# Patient Record
Sex: FEMALE | Race: WHITE | NOT HISPANIC OR LATINO | Employment: OTHER | URBAN - METROPOLITAN AREA
[De-identification: names, ages, dates, MRNs, and addresses within clinical notes are randomized per-mention and may not be internally consistent; named-entity substitution may affect disease eponyms.]

---

## 2017-01-23 ENCOUNTER — PREP FOR SURGERY (OUTPATIENT)
Dept: ORTHOPEDIC SURGERY | Facility: CLINIC | Age: 72
End: 2017-01-23

## 2017-01-23 DIAGNOSIS — Z01.818 PREOP TESTING: Primary | ICD-10-CM

## 2017-01-24 ENCOUNTER — APPOINTMENT (OUTPATIENT)
Dept: PREADMISSION TESTING | Facility: HOSPITAL | Age: 72
End: 2017-01-24

## 2017-01-24 VITALS
HEART RATE: 75 BPM | RESPIRATION RATE: 16 BRPM | DIASTOLIC BLOOD PRESSURE: 72 MMHG | WEIGHT: 185 LBS | BODY MASS INDEX: 27.4 KG/M2 | TEMPERATURE: 97.2 F | OXYGEN SATURATION: 99 % | SYSTOLIC BLOOD PRESSURE: 109 MMHG | HEIGHT: 69 IN

## 2017-01-24 DIAGNOSIS — Z01.818 PREOP TESTING: ICD-10-CM

## 2017-01-24 DIAGNOSIS — M25.561 CHRONIC PAIN OF RIGHT KNEE: Primary | ICD-10-CM

## 2017-01-24 DIAGNOSIS — G89.29 CHRONIC PAIN OF RIGHT KNEE: Primary | ICD-10-CM

## 2017-01-24 LAB
ALBUMIN SERPL-MCNC: 3.9 G/DL (ref 3.5–5.2)
ALBUMIN/GLOB SERPL: 1.5 G/DL
ALP SERPL-CCNC: 84 U/L (ref 39–117)
ALT SERPL W P-5'-P-CCNC: 39 U/L (ref 1–33)
ANION GAP SERPL CALCULATED.3IONS-SCNC: 13.9 MMOL/L
AST SERPL-CCNC: 23 U/L (ref 1–32)
BILIRUB SERPL-MCNC: 0.5 MG/DL (ref 0.1–1.2)
BILIRUB UR QL STRIP: NEGATIVE
BUN BLD-MCNC: 31 MG/DL (ref 8–23)
BUN/CREAT SERPL: 27.2 (ref 7–25)
CALCIUM SPEC-SCNC: 9.7 MG/DL (ref 8.6–10.5)
CHLORIDE SERPL-SCNC: 102 MMOL/L (ref 98–107)
CLARITY UR: CLEAR
CO2 SERPL-SCNC: 25.1 MMOL/L (ref 22–29)
COLOR UR: YELLOW
CREAT BLD-MCNC: 1.14 MG/DL (ref 0.57–1)
DEPRECATED RDW RBC AUTO: 48.4 FL (ref 37–54)
ERYTHROCYTE [DISTWIDTH] IN BLOOD BY AUTOMATED COUNT: 13 % (ref 11.7–13)
GFR SERPL CREATININE-BSD FRML MDRD: 47 ML/MIN/1.73
GLOBULIN UR ELPH-MCNC: 2.6 GM/DL
GLUCOSE BLD-MCNC: 175 MG/DL (ref 65–99)
GLUCOSE UR STRIP-MCNC: NEGATIVE MG/DL
HCT VFR BLD AUTO: 42.5 % (ref 35.6–45.5)
HGB BLD-MCNC: 13.4 G/DL (ref 11.9–15.5)
HGB UR QL STRIP.AUTO: NEGATIVE
KETONES UR QL STRIP: NEGATIVE
LEUKOCYTE ESTERASE UR QL STRIP.AUTO: NEGATIVE
MCH RBC QN AUTO: 32 PG (ref 26.9–32)
MCHC RBC AUTO-ENTMCNC: 31.5 G/DL (ref 32.4–36.3)
MCV RBC AUTO: 101.4 FL (ref 80.5–98.2)
NITRITE UR QL STRIP: NEGATIVE
PH UR STRIP.AUTO: <=5 [PH] (ref 5–8)
PLATELET # BLD AUTO: 143 10*3/MM3 (ref 140–500)
PMV BLD AUTO: 11.7 FL (ref 6–12)
POTASSIUM BLD-SCNC: 4.2 MMOL/L (ref 3.5–5.2)
PROT SERPL-MCNC: 6.5 G/DL (ref 6–8.5)
PROT UR QL STRIP: NEGATIVE
RBC # BLD AUTO: 4.19 10*6/MM3 (ref 3.9–5.2)
SODIUM BLD-SCNC: 141 MMOL/L (ref 136–145)
SP GR UR STRIP: 1.03 (ref 1–1.03)
UROBILINOGEN UR QL STRIP: NORMAL
WBC NRBC COR # BLD: 4.84 10*3/MM3 (ref 4.5–10.7)

## 2017-01-24 PROCEDURE — 97161 PT EVAL LOW COMPLEX 20 MIN: CPT

## 2017-01-24 PROCEDURE — G8978 MOBILITY CURRENT STATUS: HCPCS

## 2017-01-24 PROCEDURE — G8979 MOBILITY GOAL STATUS: HCPCS

## 2017-01-24 PROCEDURE — 36415 COLL VENOUS BLD VENIPUNCTURE: CPT

## 2017-01-24 PROCEDURE — 80053 COMPREHEN METABOLIC PANEL: CPT | Performed by: ORTHOPAEDIC SURGERY

## 2017-01-24 PROCEDURE — G8980 MOBILITY D/C STATUS: HCPCS

## 2017-01-24 PROCEDURE — 81003 URINALYSIS AUTO W/O SCOPE: CPT | Performed by: ORTHOPAEDIC SURGERY

## 2017-01-24 PROCEDURE — 85027 COMPLETE CBC AUTOMATED: CPT | Performed by: ORTHOPAEDIC SURGERY

## 2017-01-24 RX ORDER — CHLORHEXIDINE GLUCONATE 500 MG/1
1 CLOTH TOPICAL TAKE AS DIRECTED
COMMUNITY
End: 2017-02-17 | Stop reason: HOSPADM

## 2017-01-24 NOTE — PROGRESS NOTES
Physical Therapy Outpatient Preoperative Total Joint Evaluation     Patient Name: Carolyn Nicolas  : 1945  MRN: [id number]  Today's Date: 2017         Surgery Date: 17    Patient Active Problem List   Diagnosis   • GERD (gastroesophageal reflux disease)   • History of colon polyps   • Non-alcoholic fatty liver disease   • Impaired renal function   • Breathlessness on exertion   • Cardiomyopathy   • Benign essential HTN   • CAD in native artery   • Primary osteoarthritis of both knees   • MGUS (monoclonal gammopathy of unknown significance)   • Anemia of chronic renal failure, stage 3 (moderate)   • Chronic pain of both knees   • Arthritis of both knees   • Automatic implantable cardioverter-defibrillator in situ   • Heart failure   • Hyperlipidemia   • Hypertension   • Hypercalcemia   • Generalized ischemic myocardial dysfunction        Past Medical History   Diagnosis Date   • Arthritis    • CHF (congestive heart failure)    • Coronary artery disease    • Diabetes mellitus    • Hypertension    • Implantable cardioverter-defibrillator discharge         Past Surgical History   Procedure Laterality Date   • Eye surgery     • Knee arthroscopy Bilateral 2009   • Coronary angioplasty with stent placement     • Cardiac defibrillator placement     • Endoscopy and colonoscopy N/A 3/8/2016     Procedure: ESOPHAGOGASTRODUODENOSCOPY AND COLONOSCOPY;  Surgeon: Dwight Monae MD;  Location: [facility] ENDOSCOPY;  Service:    • Cardiac defibrillator placement                TOTAL JOINT PREOP EVAL (last 72 hours)      Total Joint Preop Evaluation       17 1027                Preoperative Evaluation    Surgery TKR: Right  -TV        Surgery Date 17  -TV        Previous Total Joint No  -TV        Attended Class yes  -TV        Prior Activity Status    All Household independent  -TV        All Community independent  -TV        Gait independent  -TV        Transfers independent  -TV          none  -TV        DC Plans Sub Acute   [place]  -TV        Assist at home none  -TV        Home Environment 1 story  -TV        Exterior steps 1 rail   3  -TV        Pain 0-10    Pain Level 0  -TV        LE Measurements - ROM    Hip Flexion - Right AROM is WNL;Strength is grossly > or equal to 3/5  -TV        Hip Abduction - Right AROM is WNL;Strength is grossly > or equal to 3/5  -TV        Knee Flexion - Right Strength is grossly > or equal to 3/5   120  -TV        Knee Extension - Right Strength is grossly > or equal to 3/5   -20  -TV        Hip Flexion - Left AROM is WNL;Strength is grossly > or equal to 3/5  -TV        Hip Abduction - Left AROM is WNL;Strength is grossly > or equal to 3/5  -TV        Knee Flexion - Left Strength is grossly > or equal to 3/5   110  -TV        Knee Extension - Left Strength is grossly > or equal to 3/5   -10  -TV        UE ROM/Strength    UE ROM/Strength adequate for walker use  -TV        Other Measurements    Sensation/Circulation intact  -TV        Gait Observation no device  -TV        Equipment    Equipment Patient Has Cane  -TV        Equipment Patient Needs Walker  -TV        ASSESSMENT    Goal Patient demonstrates good understanding of post-op P.T.;Exercises;Surgical Procedure  -TV        Goal Met? Yes  -TV        Anticipated Progress good  -TV        Patient agrees with Plan of Treatment? Yes  -TV        Plan Will see for post op TJR protocol  -TV          User Key  (r) = Recorded By, (t) = Taken By, (c) = Cosigned By    Initials Name Effective Dates    TV Lauren Pike PT 01/07/16 -              Time Calculation:          Therapy Charges for Today     Code Description Service Date Service Provider Modifiers Qty    48724131406 HC PT MOBILITY CURRENT 1/24/2017 Lauren Pike, PT GP, CI 1    64911864140 HC PT MOBILITY PROJECTED 1/24/2017 Lauren Pike, PT GP, CI 1    [id number] HC PT MOBILITY DISCHARGE 1/24/2017 Lauren Pike, PT GP, CI 1    60663414898 HC PAT-TOTAL JOINT  CLASS 1/24/2017 Lauren Pike, PT  1    37424147692 HC PT EVAL LOW COMPLEXITY 1 1/24/2017 Lauren Pike, PT GP 1            PT G-Codes  PT Professional Judgement Used?: Yes  Functional Limitation: Mobility: Walking and moving around  Mobility: Walking and Moving Around Current Status (): At least 1 percent but less than 20 percent impaired, limited or restricted  Mobility: Walking and Moving Around Goal Status (): At least 1 percent but less than 20 percent impaired, limited or restricted  Mobility: Walking and Moving Around Discharge Status (): At least 1 percent but less than 20 percent impaired, limited or restricted      Lauren Pike, PT  1/24/2017

## 2017-01-24 NOTE — MR AVS SNAPSHOT
Carolyn Nicolas   1/24/2017 10:30 AM   Appointment    Provider:  BERT OLIVIER 2   Department:  [facility]   Dept Phone:  [phone]                Your Full Care Plan           To Do List     1/24/2017 10:30 AM     Appointment with BERT OLIVIER 2 at [facility] ([phone])   [address] [facility] [address]       2/3/2017 9:15 AM     Appointment with Gustavo Quiroga MD at [facility] ([phone])   Arrive 15 minutes prior to appointment.   [address] [facility] [address]  [facility] [address]       2/9/2017 8:40 AM     Appointment with Andrew Monae MD at [facility] ([phone])   [address]  [facility] [address]            Your Updated Medication List          This list is accurate as of: 1/24/17  9:09 AM.  Always use your most recent med list.                aspirin 81 MG tablet       atorvastatin 20 MG tablet   Commonly known as:  LIPITOR       * B-D UF III MINI PEN NEEDLES 31G X 5 MM misc   Generic drug:  Insulin Pen Needle       * B-D UF III MINI PEN NEEDLES 31G X 5 MM misc   Generic drug:  Insulin Pen Needle       * BD PEN NEEDLE JACOB U/F 32G X 4 MM misc   Generic drug:  Insulin Pen Needle       carvedilol 6.25 MG tablet   Commonly known as:  COREG       Chlorhexidine Gluconate Cloth 2 % pads       esomeprazole 40 MG capsule   Commonly known as:  nexIUM       fenofibrate 48 MG tablet   Commonly known as:  TRICOR       furosemide 40 MG tablet   Commonly known as:  LASIX       glimepiride 2 MG tablet   Commonly known as:  AMARYL       Liraglutide 18 MG/3ML solution pen-injector       mupirocin 2 % nasal ointment   Commonly known as:  BACTROBAN       ONE TOUCH ULTRA TEST test strip   Generic drug:  glucose blood       ONETOUCH DELICA LANCETS 33G misc       pramipexole 0.5 MG tablet   Commonly known  "as:  MIRAPEX       sacubitril-valsartan 49-51 MG tablet   Commonly known as:  ENTRESTO   Take 1 tablet by mouth 2 (Two) Times a Day. Indications: Heart Failure       vitamin D 90570 UNITS capsule capsule   Commonly known as:  ERGOCALCIFEROL       * Notice:  This list has 3 medication(s) that are the same as other medications prescribed for you. Read the directions carefully, and ask your doctor or other care provider to review them with you.            [employer] Signup     [facility] [employer] allows you to send messages to your doctor, view your test results, renew your prescriptions, schedule appointments, and more. To sign up, go to [employer] and click on the Sign Up Now link in the New User? box. Enter your [employer] Activation Code exactly as it appears below along with the last four digits of your Social Security Number and your Date of Birth () to complete the sign-up process. If you do not sign up before the expiration date, you must request a new code.    [employer]cast Activation Code: [email or web address]  Expires: 2017  9:09 AM    If you have questions, you can email [email or web address] or call [phone] to talk to our [employer] staff. Remember, [employer] is NOT to be used for urgent needs. For medical emergencies, dial 911.               Other Info from Your Visit           Allergies     Amoxicillin Nausea And Vomiting      Vital Signs     Blood Pressure Pulse Temperature Respirations Height Weight    109/72 (BP Location: Right arm, Patient Position: Sitting) 75 97.2 °F (36.2 °C) (Oral) 16 68.5\" (174 cm) 185 lb (83.9 kg)    Oxygen Saturation Body Mass Index Smoking Status             99% 27.72 kg/m2 Never Smoker           Discharge Instructions       Take the following medications the morning of surgery with a small sip of water.  ENTRESTO  CARVEDILOL  ESOMEPRAZOLE    ARRIVE AT 7AM.        General Instructions:  • Do not eat or drink after midnight: includes water, mints, or gum. You " may brush your teeth.  • Do not smoke, chew tobacco, or drink alcohol.  • Bring medications in original bottles, any inhalers and if applicable your C-PAP/ BI-PAP machine.  • Bring any papers given to you in the doctor’s office.  • Wear clean comfortable clothes and socks.  • Do not wear contact lenses or make-up.  Bring a case for your glasses if applicable.   • Bring crutches or walker if applicable.  • Leave all other valuables and jewelry at home.    If you were given a blood bank ID arm band remember to bring it with you the day of surgery.    Preventing a Surgical Site Infection:  Shower on the morning of surgery using a fresh bar of anti-bacterial soap (such as Dial) and clean washcloth.  Dry with a clean towel and dress in clean clothing.  For 2 to 3 days before surgery, avoid shaving with a razor near where you will have surgery because the razor can irritate skin and make it easier to develop an infection  Ask your surgeon if you will be receiving antibiotics prior to surgery  Make sure you, your family, and all healthcare providers clean their hands with soap and water or an alcohol based hand  before caring for you or your wound  If at all possible, quit smoking as many days before surgery as you can.    Day of surgery:  Upon arrival, a Pre-op nurse and Anesthesiologist will review your health history, obtain vital signs, and answer questions you may have.  The only belongings needed at this time will be your home medications and if applicable your C-PAP/BI-PAP machine.  If you are staying overnight your family can leave the rest of your belongings in the car and bring them to your room later.  A Pre-op nurse will start an IV and you may receive medication in preparation for surgery, including something to help you relax.  Your family will be able to see you in the Pre-op area.  While you are in surgery your family should notify the waiting room  if they leave the waiting room area  and provide a contact phone number.    Please be aware that surgery does come with discomfort.  We want to make every effort to control your discomfort so please discuss any uncontrolled symptoms with your nurse.   Your doctor will most likely have prescribed pain medications.      If you are going home after surgery you will receive individualized written care instructions before being discharged.  A responsible adult must drive you to and from the hospital on the day of your surgery and stay with you for 24 hours.    If you are staying overnight following surgery, you will be transported to your hospital room following the recovery period.  [facility] has all private rooms.    If you have any questions please call Pre-Admission Testing at [phone].  Deductibles and co-payments are collected on the day of service. Please be prepared to pay the required co-pay, deductible or deposit on the day of service as defined by your plan.    2% CHLORAHEXIDINE GLUCONATE* CLOTH  Preparing or “prepping” skin before surgery can reduce the risk of infection at the surgical site. To make the process easier, [facility] has chosen disposable cloths moistened with a rinse-free, 2% Chlorhexidine Gluconate (CHG) antiseptic solution. The steps below outline the prepping process and should be carefully followed.        Use the prep cloth on the area that is circled in the diagram             Directions Night before Surgery  1) Shower using a fresh bar of anti-bacterial soap (such as Dial) and clean washcloth.  Use a clean towel to completely dry your skin.  2) Do not use any lotions, oils or creams on your skin.  3) Open the package and remove 1 cloth, wipe your skin for 30 seconds in a circular motion.  Allow to dry for 3 minutes.  4) Repeat #3 with second cloth.  5) Do not touch your eyes, ears, or mouth with the prep cloth.  6) Allow the wet prep solution to air dry.  7) Discard the prep cloth and wash  your hands with soap and water.   8) Dress in clean bed clothes and sleep on fresh clean bed sheets.   9) You may experience some temporary itching after the prep.    Directions Day of Surgery  1) Repeat steps 1,2,3,4,5,6,7, and 9.   2) Dress in clean clothes before coming to the hospital.    BACTROBAN NASAL OINTMENT  There are many germs normally in your nose. Bactroban is an ointment that will help reduce these germs. Please follow these instructions for Bactroban use:        ____The day before surgery in the morning  Date________    ____The day before surgery in the evening              Date________    ____The day of surgery in the morning    Date________    **Squirt ½ package of Bactroban Ointment onto a cotton applicator and apply to inside of 1st nostril.  Squirt the remaining Bactroban and apply to the inside of the other nostril.           SYMPTOMS OF A STROKE    Call 911 or have someone take you to the Emergency Department if you have any of the following:    · Sudden numbness or weakness of your face, arm or leg especially on one side of the body  · Sudden confusion, diffiiculty speaking or trouble understanding   · Changes in your vision or loss of sight in one eye  · Sudden severe headache with no known cause  · sudden dizziness, trouble walking, loss of balance or coordination    It is important to seek emergency care right away if you have further stroke symptoms. If you get emergency help quickly, the powerful clot-dissolving medicines can reduce the disabilities caused by a stroke.     For more information:    American Stroke Association  2-962-5-STROKE  www.strokeassociation.org           IF YOU SMOKE OR USE TOBACCO PLEASE READ THE FOLLOWING:    Why is smoking bad for me?  Smoking increases the risk of heart disease, lung disease, vascular disease, stroke, and cancer.     If you smoke, STOP!    If you would like more information on quitting smoking, please visit the [employer] website:  www.[employer])/healthier-together/smoke   This link will provide additional resources including the QUIT line and the [employer] support groups.     For more information:    American Cancer Society  ([phone] 358-8281    American Heart Association  1-496.541.7002

## 2017-01-24 NOTE — DISCHARGE INSTRUCTIONS
Take the following medications the morning of surgery with a small sip of water.  ENTRESTO  CARVEDILOL  ESOMEPRAZOLE    ARRIVE AT 7AM.        General Instructions:  • Do not eat or drink after midnight: includes water, mints, or gum. You may brush your teeth.  • Do not smoke, chew tobacco, or drink alcohol.  • Bring medications in original bottles, any inhalers and if applicable your C-PAP/ BI-PAP machine.  • Bring any papers given to you in the doctor’s office.  • Wear clean comfortable clothes and socks.  • Do not wear contact lenses or make-up.  Bring a case for your glasses if applicable.   • Bring crutches or walker if applicable.  • Leave all other valuables and jewelry at home.    If you were given a blood bank ID arm band remember to bring it with you the day of surgery.    Preventing a Surgical Site Infection:  Shower on the morning of surgery using a fresh bar of anti-bacterial soap (such as Dial) and clean washcloth.  Dry with a clean towel and dress in clean clothing.  For 2 to 3 days before surgery, avoid shaving with a razor near where you will have surgery because the razor can irritate skin and make it easier to develop an infection  Ask your surgeon if you will be receiving antibiotics prior to surgery  Make sure you, your family, and all healthcare providers clean their hands with soap and water or an alcohol based hand  before caring for you or your wound  If at all possible, quit smoking as many days before surgery as you can.    Day of surgery:  Upon arrival, a Pre-op nurse and Anesthesiologist will review your health history, obtain vital signs, and answer questions you may have.  The only belongings needed at this time will be your home medications and if applicable your C-PAP/BI-PAP machine.  If you are staying overnight your family can leave the rest of your belongings in the car and bring them to your room later.  A Pre-op nurse will start an IV and you may receive medication in  preparation for surgery, including something to help you relax.  Your family will be able to see you in the Pre-op area.  While you are in surgery your family should notify the waiting room  if they leave the waiting room area and provide a contact phone number.    Please be aware that surgery does come with discomfort.  We want to make every effort to control your discomfort so please discuss any uncontrolled symptoms with your nurse.   Your doctor will most likely have prescribed pain medications.      If you are going home after surgery you will receive individualized written care instructions before being discharged.  A responsible adult must drive you to and from the hospital on the day of your surgery and stay with you for 24 hours.    If you are staying overnight following surgery, you will be transported to your hospital room following the recovery period.  [facility] has all private rooms.    If you have any questions please call Pre-Admission Testing at [phone].  Deductibles and co-payments are collected on the day of service. Please be prepared to pay the required co-pay, deductible or deposit on the day of service as defined by your plan.    2% CHLORAHEXIDINE GLUCONATE* CLOTH  Preparing or “prepping” skin before surgery can reduce the risk of infection at the surgical site. To make the process easier, [facility] has chosen disposable cloths moistened with a rinse-free, 2% Chlorhexidine Gluconate (CHG) antiseptic solution. The steps below outline the prepping process and should be carefully followed.        Use the prep cloth on the area that is circled in the diagram             Directions Night before Surgery  1) Shower using a fresh bar of anti-bacterial soap (such as Dial) and clean washcloth.  Use a clean towel to completely dry your skin.  2) Do not use any lotions, oils or creams on your skin.  3) Open the package and remove 1 cloth, wipe your skin for 30  seconds in a circular motion.  Allow to dry for 3 minutes.  4) Repeat #3 with second cloth.  5) Do not touch your eyes, ears, or mouth with the prep cloth.  6) Allow the wet prep solution to air dry.  7) Discard the prep cloth and wash your hands with soap and water.   8) Dress in clean bed clothes and sleep on fresh clean bed sheets.   9) You may experience some temporary itching after the prep.    Directions Day of Surgery  1) Repeat steps 1,2,3,4,5,6,7, and 9.   2) Dress in clean clothes before coming to the hospital.    BACTROBAN NASAL OINTMENT  There are many germs normally in your nose. Bactroban is an ointment that will help reduce these germs. Please follow these instructions for Bactroban use:        ____The day before surgery in the morning  Date________    ____The day before surgery in the evening              Date________    ____The day of surgery in the morning    Date________    **Squirt ½ package of Bactroban Ointment onto a cotton applicator and apply to inside of 1st nostril.  Squirt the remaining Bactroban and apply to the inside of the other nostril.

## 2017-02-01 ENCOUNTER — DOCUMENTATION (OUTPATIENT)
Dept: PHYSICAL THERAPY | Facility: CLINIC | Age: 72
End: 2017-02-01

## 2017-02-01 RX ORDER — SACUBITRIL AND VALSARTAN 49; 51 MG/1; MG/1
TABLET, FILM COATED ORAL
Qty: 180 TABLET | Refills: 0 | Status: ON HOLD | OUTPATIENT
Start: 2017-02-01 | End: 2017-02-14 | Stop reason: SDUPTHER

## 2017-02-03 ENCOUNTER — OFFICE VISIT (OUTPATIENT)
Dept: CARDIOLOGY | Facility: CLINIC | Age: 72
End: 2017-02-03

## 2017-02-03 VITALS
BODY MASS INDEX: 27.72 KG/M2 | DIASTOLIC BLOOD PRESSURE: 77 MMHG | WEIGHT: 185 LBS | SYSTOLIC BLOOD PRESSURE: 115 MMHG | HEART RATE: 76 BPM

## 2017-02-03 DIAGNOSIS — I50.22 CHRONIC SYSTOLIC HEART FAILURE (HCC): ICD-10-CM

## 2017-02-03 DIAGNOSIS — I10 BENIGN ESSENTIAL HTN: ICD-10-CM

## 2017-02-03 DIAGNOSIS — I10 ESSENTIAL HYPERTENSION: ICD-10-CM

## 2017-02-03 DIAGNOSIS — I42.9 CARDIOMYOPATHY (HCC): Primary | ICD-10-CM

## 2017-02-03 DIAGNOSIS — K21.9 GASTROESOPHAGEAL REFLUX DISEASE WITHOUT ESOPHAGITIS: ICD-10-CM

## 2017-02-03 DIAGNOSIS — I25.10 CAD IN NATIVE ARTERY: ICD-10-CM

## 2017-02-03 PROCEDURE — 93000 ELECTROCARDIOGRAM COMPLETE: CPT | Performed by: INTERNAL MEDICINE

## 2017-02-03 PROCEDURE — 99214 OFFICE O/P EST MOD 30 MIN: CPT | Performed by: INTERNAL MEDICINE

## 2017-02-03 RX ORDER — GLIMEPIRIDE 4 MG/1
TABLET ORAL
Refills: 3 | Status: ON HOLD | COMMUNITY
Start: 2016-12-16 | End: 2017-02-14 | Stop reason: SDUPTHER

## 2017-02-03 RX ORDER — FENOFIBRATE 48 MG/1
TABLET, COATED ORAL
Status: ON HOLD | COMMUNITY
Start: 2017-02-01 | End: 2017-02-14 | Stop reason: SDUPTHER

## 2017-02-03 NOTE — PROGRESS NOTES
Procedure   [facility]  Cardiology Progress Note    Patient Identification:  Name:Carolyn Nicolas  Age:71 y.o.  Sex: female  :  1945  MRN: [id number]           2/3/2017    Subjective:    Chief Complaint   Patient presents with   • Coronary Artery Disease   Preop eval    HPI     Ms Swelling 71-year-old white female with history of cardiomyopathy, Bi-V ICD followed by Dr. Ely, hypertension, hyperlipidemia who presents for cardiac follow-up. No chest pain. No orthopnea or PND. No dizziness, palpitations or syncope. No edema.Weight is stable, downward trend. Cholesterol studies are followed by Dr. Daniel. Her blood pressure is been running average 100 systolic with no associated symptoms. No shocks. Overall, her functional capacity is stable. She has no complaints.      Echocardiogram with Doppler shows LVEF 40-45%, she is on Entresto but I am not sure whether she will tolerate higher dose     She did have coronary stenting as well, has class 2 dyspnea on exertion that is stable. She had an episode of chest discomfort last week when she is doing some work, lasted for 10 min and resolved. No recurrance since then. Due to that she had a stress test done that showed no ischemia.  She is also waiting for a knee surgery.    Review of Systems   Constitution: Positive for malaise/fatigue. Negative for chills and fever.   HENT: Negative for headaches.    Eyes: Negative for blurred vision and double vision. Left eye: glasses.   Cardiovascular: Positive for leg swelling and palpitations (occ). Negative for chest pain and irregular heartbeat.   Respiratory: Positive for shortness of breath (occ). Negative for snoring.    Skin: Negative for color change.   Musculoskeletal: Positive for arthritis and joint pain.   Gastrointestinal: Negative for abdominal pain, nausea and vomiting.   Neurological: Negative for dizziness, light-headedness and numbness.   Psychiatric/Behavioral: Negative for depression.        Past Medical History   Diagnosis Date   • Arthritis    • CHF (congestive heart failure)    • Coronary artery disease    • Diabetes mellitus    • Hypertension    • Implantable cardioverter-defibrillator discharge        Past Surgical History   Procedure Laterality Date   • Eye surgery     • Knee arthroscopy Bilateral 2009 2007   • Coronary angioplasty with stent placement  2010   • Cardiac defibrillator placement     • Endoscopy and colonoscopy N/A 3/8/2016     Procedure: ESOPHAGOGASTRODUODENOSCOPY AND COLONOSCOPY;  Surgeon: Dwight Monae MD;  Location: [facility] ENDOSCOPY;  Service:    • Cardiac defibrillator placement         Social History     Social History   • Marital status:      Spouse name: N/A   • Number of children: N/A   • Years of education: College     Occupational History   • Teacher Retired     Social History Main Topics   • Smoking status: Never Smoker   • Smokeless tobacco: Not on file   • Alcohol use No   • Drug use: No   • Sexual activity: Defer     Other Topics Concern   • Not on file     Social History Narrative       Family History   Problem Relation Age of Onset   • Diabetes Mother    • Heart disease Mother    • Heart disease Father    • Cancer Father 85     Bladder   • Cancer Brother 58     Bladder   • Heart disease Maternal Grandmother    • Heart disease Maternal Grandfather        Scheduled Meds:    Current Outpatient Prescriptions:   •  aspirin 81 MG tablet, Take  by mouth. HOLD PER MD INSTR, Disp: , Rfl:   •  atorvastatin (LIPITOR) 20 MG tablet, Take 20 mg by mouth Every Night., Disp: , Rfl:   •  B-D UF III MINI PEN NEEDLES 31G X 5 MM misc, INJECT 1 EACH INTO THE SKIN DAILY, Disp: , Rfl: 6  •  BD PEN NEEDLE JACOB U/F 32G X 4 MM misc, , Disp: , Rfl:   •  carvedilol (COREG) 6.25 MG tablet, Take 6.25 mg by mouth 2 (Two) Times a Day., Disp: , Rfl:   •  Chlorhexidine Gluconate Cloth 2 % pads, Apply  topically. PREOP INSTR GIVEN, Disp: , Rfl:   •  ENTRESTO 49-51 MG tablet, TAKE 1  TABLET TWICE A DAY, Disp: 180 tablet, Rfl: 0  •  esomeprazole (NexIUM) 40 MG capsule, Take 40 mg by mouth Every Morning Before Breakfast. TAKE 1 CAPSULE BY MOUTH EVERY MORNING, Disp: , Rfl: 3  •  fenofibrate (TRICOR) 48 MG tablet, TAKE 1 TABLET DAILY, Disp: , Rfl:   •  furosemide (LASIX) 40 MG tablet, Take 40 mg by mouth Daily., Disp: , Rfl:   •  glimepiride (AMARYL) 4 MG tablet, TAKE 1 TABLET BY MOUTH DAILY BEFORE SUPPER, Disp: , Rfl: 3  •  Insulin Pen Needle (B-D UF III MINI PEN NEEDLES) 31G X 5 MM misc, INJECT 1 EACH INTO THE SKIN DAILY, Disp: , Rfl:   •  Liraglutide 18 MG/3ML solution pen-injector, Inject  under the skin daily., Disp: , Rfl:   •  mupirocin (BACTROBAN) 2 % nasal ointment, into each nostril 2 (Two) Times a Day. PREOP INSTR GIVEN, Disp: , Rfl:   •  ONE TOUCH ULTRA TEST test strip, USE TO TEST BLOOD SUGAR FOUR TIMES DAILY (DX: 250.02), Disp: , Rfl: 3  •  ONETOUCH DELICA LANCETS 33G misc, USE TO TEST BLOOD SUGAR FOUR TIMES DAILY (DX:250.02), Disp: , Rfl: 3  •  pramipexole (MIRAPEX) 0.5 MG tablet, Take 0.5 mg by mouth Every Night., Disp: , Rfl:   •  vitamin D (ERGOCALCIFEROL) 09629 UNITS capsule capsule, TAKE 1 CAPSULE BY MOUTH ONCE A WEEK, Disp: , Rfl: 3    Objective:  Visit Vitals   • /77   • Pulse 76   • Wt 185 lb (83.9 kg)   • BMI 27.72 kg/m2        Physical Exam  Physical Exam:    General: No acute distress.    Skin: Warm and dry, no diaphoresis noted   HEENT: No ptosis; external ear and nose normal; oral mucosa moist   Neck: Supple; no carotid bruits; no JVD, Trachea mid line   Heart: S1S2 regular rate and rhythm; no murmurs; no gallop or rub appreciated, apex not displaced   Chest: Respirations regular, unlabored at rest, bilateral breath sounds have good air entry; no  wheezes auscultated.     Abdomen: Soft, non-tender, non-distended, positive bowel sounds  No hepatosplenomegaly   Extremities: Bilateral lower extremities have no pre-tibial pitting edema; Radials are palpable    Neurological: Alert and oriented x 3; no new motor deficits,           ECG 12 Lead  Date/Time: 2/3/2017 10:51 AM  Performed by: SUZI QUIROGA  Authorized by: SUZI QUIROGA   Comparison: compared with previous ECG   Comparison to previous ECG: Atrial sensed with V pacing  Rhythm: sinus rhythm and paced           Comparison to previous ECG:  Similar to previous ecg     Assessment:  Problem List Items Addressed This Visit        Cardiovascular and Mediastinum    Cardiomyopathy - Primary    Benign essential HTN    CAD in native artery    Heart failure    Hypertension       Digestive    GERD (gastroesophageal reflux disease)          Plan:    Overall clinically she has been stable with no angina.  She does have a cardiomyopathy that has been stable.  On the stress study no evidence of ischemia last year.  No angina.  Her heart failure has been stable.    From cardiac standpoint of view no contraindications can proceed with the planned knee surgery.  Would recommend to avoid hypotension perioperatively.    After the knee surgery I will try to increase the dose of the Entresto.      02/03/2017  Herbert Quiroga MD, FACC

## 2017-02-09 ENCOUNTER — OFFICE VISIT (OUTPATIENT)
Dept: ORTHOPEDIC SURGERY | Facility: CLINIC | Age: 72
End: 2017-02-09

## 2017-02-09 VITALS — BODY MASS INDEX: 28.04 KG/M2 | HEIGHT: 68 IN | WEIGHT: 185 LBS | TEMPERATURE: 98.9 F

## 2017-02-09 DIAGNOSIS — M17.11 PRIMARY OSTEOARTHRITIS OF ONE KNEE, RIGHT: Primary | ICD-10-CM

## 2017-02-09 PROBLEM — M17.10 PRIMARY OSTEOARTHRITIS OF ONE KNEE: Status: ACTIVE | Noted: 2017-02-09

## 2017-02-09 PROCEDURE — 73562 X-RAY EXAM OF KNEE 3: CPT | Performed by: ORTHOPAEDIC SURGERY

## 2017-02-09 PROCEDURE — 99213 OFFICE O/P EST LOW 20 MIN: CPT | Performed by: ORTHOPAEDIC SURGERY

## 2017-02-09 NOTE — PROGRESS NOTES
History & Physical       Patient: Carolyn Nicolas  YOB: 1945  Medical Record Number: [id number]  Body mass index is 28.13 kg/(m^2).    Surgeon:  Dr. Andrew Monae    Chief Complaints:   Chief Complaint   Patient presents with   • Right Knee - Pre-op Exam       Subjective:  This problem is not new to this examiner.     History of Present Illness: 71 y.o. female presents with   Chief Complaint   Patient presents with   • Right Knee - Pre-op Exam   . Onset of symptoms was years ago and has been progressively worsening despite more conservative treatment measures.  Symptoms are associated with ability to move, exercise, and perform activities of daily living.  Symptoms are aggravated by weight bearing and ROM necessary for activities of daily living.   Symptoms improve with rest, ice and elevation only minimally.      Allergies:   Allergies   Allergen Reactions   • Amoxicillin Nausea And Vomiting       Medications:   Home Medications:  Current Outpatient Prescriptions on File Prior to Visit   Medication Sig   • aspirin 81 MG tablet Take  by mouth. HOLD PER MD INSTR   • atorvastatin (LIPITOR) 20 MG tablet Take 20 mg by mouth Every Night.   • B-D UF III MINI PEN NEEDLES 31G X 5 MM misc INJECT 1 EACH INTO THE SKIN DAILY   • BD PEN NEEDLE JACOB U/F 32G X 4 MM misc    • carvedilol (COREG) 6.25 MG tablet Take 6.25 mg by mouth 2 (Two) Times a Day.   • Chlorhexidine Gluconate Cloth 2 % pads Apply  topically. PREOP INSTR GIVEN   • ENTRESTO 49-51 MG tablet TAKE 1 TABLET TWICE A DAY   • esomeprazole (NexIUM) 40 MG capsule Take 40 mg by mouth Every Morning Before Breakfast. TAKE 1 CAPSULE BY MOUTH EVERY MORNING   • fenofibrate (TRICOR) 48 MG tablet TAKE 1 TABLET DAILY   • furosemide (LASIX) 40 MG tablet Take 40 mg by mouth Daily.   • glimepiride (AMARYL) 4 MG tablet TAKE 1 TABLET BY MOUTH DAILY BEFORE SUPPER   • Insulin Pen Needle (B-D UF III MINI PEN NEEDLES) 31G X 5 MM misc INJECT 1 EACH INTO THE SKIN DAILY    • Liraglutide 18 MG/3ML solution pen-injector Inject  under the skin daily.   • mupirocin (BACTROBAN) 2 % nasal ointment into each nostril 2 (Two) Times a Day. PREOP INSTR GIVEN   • ONE TOUCH ULTRA TEST test strip USE TO TEST BLOOD SUGAR FOUR TIMES DAILY (DX: 250.02)   • ONETOUCH DELICA LANCETS 33G misc USE TO TEST BLOOD SUGAR FOUR TIMES DAILY (DX:250.02)   • pramipexole (MIRAPEX) 0.5 MG tablet Take 0.5 mg by mouth Every Night.   • vitamin D (ERGOCALCIFEROL) 42716 UNITS capsule capsule TAKE 1 CAPSULE BY MOUTH ONCE A WEEK     No current facility-administered medications on file prior to visit.      Current Medications:  Scheduled Meds:  Continuous Infusions:  No current facility-administered medications for this visit.   PRN Meds:.    I have reviewed the patient's medical history in detail and updated the computerized patient record.  Review and summarization of old records include:    Past Medical History   Diagnosis Date   • Arthritis    • CHF (congestive heart failure)    • Coronary artery disease    • Diabetes mellitus    • Hypertension    • Implantable cardioverter-defibrillator discharge         Past Surgical History   Procedure Laterality Date   • Eye surgery     • Knee arthroscopy Bilateral 2009 2007   • Coronary angioplasty with stent placement  2010   • Cardiac defibrillator placement     • Endoscopy and colonoscopy N/A 3/8/2016     Procedure: ESOPHAGOGASTRODUODENOSCOPY AND COLONOSCOPY;  Surgeon: Dwight Monae MD;  Location: [facility] ENDOSCOPY;  Service:    • Cardiac defibrillator placement          Social History     Occupational History   • Teacher Retired     Social History Main Topics   • Smoking status: Never Smoker   • Smokeless tobacco: Never Used   • Alcohol use No   • Drug use: No   • Sexual activity: Defer    Social History     Social History Narrative        Family History   Problem Relation Age of Onset   • Diabetes Mother    • Heart disease Mother    • Heart disease Father    • Cancer  Father 85     Bladder   • Cancer Brother 58     Bladder   • Heart disease Maternal Grandmother    • Heart disease Maternal Grandfather        ROS: 14 point review of systems was performed and was negative except for documented findings in HPI and today's encounter.     Allergies:   Allergies   Allergen Reactions   • Amoxicillin Nausea And Vomiting     Constitutional:  Denies fever, shaking or chills   Eyes:  Denies change in visual acuity   HENT:  Denies nasal congestion or sore throat   Respiratory:  Denies cough or shortness of breath   Cardiovascular:  Denies chest pain or severe LE edema   GI:  Denies abdominal pain, nausea, vomiting, bloody stools or diarrhea   Musculoskeletal:  Denies numbness tingling or loss of motor function except as outlined above in history of present illness.  : Denies painful urination or hematuria  Integument:  Denies rash, lesion or ulceration   Neurologic:  Denies headache or focal weakness  Endocrine:  Denies lymphadenopathy  Psych:  Denies confusion or change in mental status   Hem:  Denies active bleeding    Physical Exam: 71 y.o. female  Body mass index is 28.13 kg/(m^2)., @HT@, @WT@  Vitals:    02/09/17 0821   Temp: 98.9 °F (37.2 °C)     Vital signs reviewed.   General Appearance:    Alert, cooperative, in no acute distress                  Eyes: conjunctiva clear  ENT: external ears and nose atraumatic  CV: no peripheral edema  Resp: normal respiratory effort  Skin: no rashes or wounds; normal turgor  Psych: mood and affect appropriate  Lymph: no nodes appreciated  Neuro: gross sensation intact  Vascular:  Palpable peripheral pulse in noted extremity  Musculoskeletal Extremities: KNEE Exam: lateral joint line tenderness with crepitation, synovitis, swelling, and joint effusion right knee.          Diagnostic Tests:  No visits with results within 2 Week(s) from this visit.  Latest known visit with results is:    Appointment on 01/24/2017   Component Date Value Ref Range  Status   • Glucose 01/24/2017 175* 65 - 99 mg/dL Final   • BUN 01/24/2017 31* 8 - 23 mg/dL Final   • Creatinine 01/24/2017 1.14* 0.57 - 1.00 mg/dL Final   • Sodium 01/24/2017 141  136 - 145 mmol/L Final   • Potassium 01/24/2017 4.2  3.5 - 5.2 mmol/L Final   • Chloride 01/24/2017 102  98 - 107 mmol/L Final   • CO2 01/24/2017 25.1  22.0 - 29.0 mmol/L Final   • Calcium 01/24/2017 9.7  8.6 - 10.5 mg/dL Final   • Total Protein 01/24/2017 6.5  6.0 - 8.5 g/dL Final   • Albumin 01/24/2017 3.90  3.50 - 5.20 g/dL Final   • ALT (SGPT) 01/24/2017 39* 1 - 33 U/L Final   • AST (SGOT) 01/24/2017 23  1 - 32 U/L Final   • Alkaline Phosphatase 01/24/2017 84  39 - 117 U/L Final   • Total Bilirubin 01/24/2017 0.5  0.1 - 1.2 mg/dL Final   • eGFR Non  Amer 01/24/2017 47* >60 mL/min/1.73 Final   • Globulin 01/24/2017 2.6  gm/dL Final   • A/G Ratio 01/24/2017 1.5  g/dL Final   • BUN/Creatinine Ratio 01/24/2017 27.2* 7.0 - 25.0 Final   • Anion Gap 01/24/2017 13.9  mmol/L Final   • WBC 01/24/2017 4.84  4.50 - 10.70 10*3/mm3 Final   • RBC 01/24/2017 4.19  3.90 - 5.20 10*6/mm3 Final   • Hemoglobin 01/24/2017 13.4  11.9 - 15.5 g/dL Final   • Hematocrit 01/24/2017 42.5  35.6 - 45.5 % Final   • MCV 01/24/2017 101.4* 80.5 - 98.2 fL Final   • MCH 01/24/2017 32.0  26.9 - 32.0 pg Final   • MCHC 01/24/2017 31.5* 32.4 - 36.3 g/dL Final   • RDW 01/24/2017 13.0  11.7 - 13.0 % Final   • RDW-SD 01/24/2017 48.4  37.0 - 54.0 fl Final   • MPV 01/24/2017 11.7  6.0 - 12.0 fL Final   • Platelets 01/24/2017 143  140 - 500 10*3/mm3 Final   • Color, UA 01/24/2017 Yellow  Yellow, Straw Final   • Appearance, UA 01/24/2017 Clear  Clear Final   • pH, UA 01/24/2017 <=5.0  5.0 - 8.0 Final   • Specific Gravity, UA 01/24/2017 1.028  1.005 - 1.030 Final   • Glucose, UA 01/24/2017 Negative  Negative Final   • Ketones, UA 01/24/2017 Negative  Negative Final   • Bilirubin, UA 01/24/2017 Negative  Negative Final   • Blood, UA 01/24/2017 Negative  Negative Final   •  Protein, UA 01/24/2017 Negative  Negative Final   • Leuk Esterase, UA 01/24/2017 Negative  Negative Final   • Nitrite, UA 01/24/2017 Negative  Negative Final   • Urobilinogen, UA 01/24/2017 0.2 E.U./dL  0.2 - 1.0 E.U./dL Final     No results found.    Imaging was done today and discussed at length with the patient:    Indication: pain related symptoms,  Views: 3V AP, LAT & 40 degree PA right knee(s)   Findings: severe end-stage arthritis (bone on bone, subchondral sclerosis/cysts, osteophytes)  Comparison views: viewed last xray done in the office.     Assessment:  Patient Active Problem List   Diagnosis   • GERD (gastroesophageal reflux disease)   • History of colon polyps   • Non-alcoholic fatty liver disease   • Impaired renal function   • Breathlessness on exertion   • Cardiomyopathy   • Benign essential HTN   • CAD in native artery   • Primary osteoarthritis of both knees   • MGUS (monoclonal gammopathy of unknown significance)   • Anemia of chronic renal failure, stage 3 (moderate)   • Chronic pain of both knees   • Arthritis of both knees   • Automatic implantable cardioverter-defibrillator in situ   • Heart failure   • Hyperlipidemia   • Hypertension   • Hypercalcemia   • Generalized ischemic myocardial dysfunction   • Primary osteoarthritis of one knee       Plan:  Dr. Andrew Monae reviewed anatomy of a total joint arthroplasty in laymen's terms, as well as typical postoperative recovery and possibly 6-12 months for maximal recovery, and possible need for rehabilitation stay after hospitalization. We also discussed risks, benefits, alternatives, and limitations of procedure with risks including but not limited to neurovascular damage, bleeding, infection, malalignment, chronic pian, failure of implants, osteolysis, loosening of implants, loss of motion, weakness, stiffness, instability, DVT, pulmonary embolus, death, stroke, complex regional pain syndrome, myocardial infarction, and need for additional  procedures. Concept of substitution vs. replacement discussed.  No guarantees were given regarding results of surgery.      Carolyn Nicolas was given the opportunity to ask and have all questions answered today.  The patient voiced understanding of the risks, benefits, and alternative forms of treatment that were discussed and the patient consents to proceed with surgery.     Patient's blood clot history is negative.    Discharge Plan: POD 2-3 to [facility]    Patient was seen by Dr. Andrew Monae in the office today.    Date: 2/9/2017  Andrew Monae MD

## 2017-02-14 ENCOUNTER — ANESTHESIA (OUTPATIENT)
Dept: PERIOP | Facility: HOSPITAL | Age: 72
End: 2017-02-14

## 2017-02-14 ENCOUNTER — APPOINTMENT (OUTPATIENT)
Dept: GENERAL RADIOLOGY | Facility: HOSPITAL | Age: 72
End: 2017-02-14

## 2017-02-14 ENCOUNTER — ANESTHESIA EVENT (OUTPATIENT)
Dept: PERIOP | Facility: HOSPITAL | Age: 72
End: 2017-02-14

## 2017-02-14 PROBLEM — M17.11 ARTHRITIS OF RIGHT KNEE: Status: ACTIVE | Noted: 2017-02-14

## 2017-02-14 PROCEDURE — 25010000002 FENTANYL CITRATE (PF) 100 MCG/2ML SOLUTION: Performed by: NURSE ANESTHETIST, CERTIFIED REGISTERED

## 2017-02-14 PROCEDURE — 25010000002 PROPOFOL 10 MG/ML EMULSION: Performed by: NURSE ANESTHETIST, CERTIFIED REGISTERED

## 2017-02-14 PROCEDURE — 73560 X-RAY EXAM OF KNEE 1 OR 2: CPT

## 2017-02-14 PROCEDURE — 25010000002 SUCCINYLCHOLINE PER 20 MG: Performed by: NURSE ANESTHETIST, CERTIFIED REGISTERED

## 2017-02-14 PROCEDURE — 25010000002 ONDANSETRON PER 1 MG: Performed by: NURSE ANESTHETIST, CERTIFIED REGISTERED

## 2017-02-14 RX ORDER — FENTANYL CITRATE 50 UG/ML
INJECTION, SOLUTION INTRAMUSCULAR; INTRAVENOUS AS NEEDED
Status: DISCONTINUED | OUTPATIENT
Start: 2017-02-14 | End: 2017-02-14 | Stop reason: SURG

## 2017-02-14 RX ORDER — PROPOFOL 10 MG/ML
VIAL (ML) INTRAVENOUS AS NEEDED
Status: DISCONTINUED | OUTPATIENT
Start: 2017-02-14 | End: 2017-02-14 | Stop reason: SURG

## 2017-02-14 RX ORDER — ONDANSETRON 2 MG/ML
INJECTION INTRAMUSCULAR; INTRAVENOUS AS NEEDED
Status: DISCONTINUED | OUTPATIENT
Start: 2017-02-14 | End: 2017-02-14 | Stop reason: SURG

## 2017-02-14 RX ORDER — SODIUM CHLORIDE 9 MG/ML
INJECTION, SOLUTION INTRAVENOUS CONTINUOUS PRN
Status: DISCONTINUED | OUTPATIENT
Start: 2017-02-14 | End: 2017-02-14 | Stop reason: SURG

## 2017-02-14 RX ORDER — SUCCINYLCHOLINE CHLORIDE 20 MG/ML
INJECTION INTRAMUSCULAR; INTRAVENOUS AS NEEDED
Status: DISCONTINUED | OUTPATIENT
Start: 2017-02-14 | End: 2017-02-14 | Stop reason: SURG

## 2017-02-14 RX ORDER — LIDOCAINE HYDROCHLORIDE 20 MG/ML
INJECTION, SOLUTION INFILTRATION; PERINEURAL AS NEEDED
Status: DISCONTINUED | OUTPATIENT
Start: 2017-02-14 | End: 2017-02-14 | Stop reason: SURG

## 2017-02-14 RX ADMIN — FENTANYL CITRATE 50 MCG: 50 INJECTION INTRAMUSCULAR; INTRAVENOUS at 09:50

## 2017-02-14 RX ADMIN — EPHEDRINE SULFATE 10 MG: 50 INJECTION INTRAMUSCULAR; INTRAVENOUS; SUBCUTANEOUS at 10:49

## 2017-02-14 RX ADMIN — ONDANSETRON 4 MG: 2 INJECTION INTRAMUSCULAR; INTRAVENOUS at 11:04

## 2017-02-14 RX ADMIN — FENTANYL CITRATE 50 MCG: 50 INJECTION INTRAMUSCULAR; INTRAVENOUS at 10:34

## 2017-02-14 RX ADMIN — SODIUM CHLORIDE: 9 INJECTION, SOLUTION INTRAVENOUS at 09:43

## 2017-02-14 RX ADMIN — PROPOFOL 120 MG: 10 INJECTION, EMULSION INTRAVENOUS at 09:54

## 2017-02-14 RX ADMIN — PROPOFOL 30 MG: 10 INJECTION, EMULSION INTRAVENOUS at 09:55

## 2017-02-14 RX ADMIN — CLINDAMYCIN PHOSPHATE 900 MG: 150 INJECTION, SOLUTION, CONCENTRATE INTRAVENOUS at 09:43

## 2017-02-14 RX ADMIN — LIDOCAINE HYDROCHLORIDE 60 MG: 20 INJECTION, SOLUTION INFILTRATION; PERINEURAL at 09:54

## 2017-02-14 RX ADMIN — SUCCINYLCHOLINE CHLORIDE 120 MG: 20 INJECTION, SOLUTION INTRAMUSCULAR; INTRAVENOUS; PARENTERAL at 09:54

## 2017-02-14 NOTE — ANESTHESIA PROCEDURE NOTES
Airway  Urgency: elective    Date/Time: 2/14/2017 9:55 AM  Airway not difficult    General Information and Staff    Patient location during procedure: OR  Anesthesiologist: SIDNEY LANE  CRNA: KAI TRINIDAD    Indications and Patient Condition  Indications for airway management: airway protection    Preoxygenated: yes  Mask difficulty assessment: 1 - vent by mask    Final Airway Details  Final airway type: endotracheal airway      Successful airway: ETT  Cuffed: yes   Successful intubation technique: direct laryngoscopy  Endotracheal tube insertion site: oral  Blade: Bonnie  Blade size: #3  ETT size: 7.0 mm  Cormack-Lehane Classification: grade I - full view of glottis  Placement verified by: chest auscultation and capnometry   Cuff volume (mL): 6  Measured from: lips  ETT to lips (cm): 20  Number of attempts at approach: 1    Additional Comments  EBBS: ETCO2+: Lips and teeth same as pre op

## 2017-02-14 NOTE — ANESTHESIA PREPROCEDURE EVALUATION
Anesthesia Evaluation     Patient summary reviewed and Nursing notes reviewed   no history of anesthetic complications:  NPO Status: > 8 hours   Airway   Mallampati: II  TM distance: >3 FB  Neck ROM: full  Dental - normal exam     Pulmonary - normal exam   (+) shortness of breath,   Cardiovascular - normal exam    (+) pacemaker pacemaker,hypertension, CAD, CHF,       Neuro/Psych  GI/Hepatic/Renal/Endo    (+)  GERD, liver disease, chronic renal disease CRI, diabetes mellitus,     Musculoskeletal     Abdominal    Substance History      OB/GYN          Other   (+) arthritis                                 Anesthesia Plan    ASA 3     general     Anesthetic plan and risks discussed with patient.

## 2017-02-14 NOTE — ANESTHESIA POSTPROCEDURE EVALUATION
Patient: Carolyn Nicolas    Procedure Summary     Date Anesthesia Start Anesthesia Stop Room / Location    02/14/17 0943 1134  [facility] /  [facility]       Procedure Diagnosis Surgeon Provider    TOTAL KNEE ARTHROPLASTY WITH MARGE NAVIGATION (Right Knee) Arthritis of right knee  (Arthritis of right knee [M19.90]) MD Alexandru Barnett MD          Anesthesia Type: general  Last vitals  /56 (02/14/17 1200)    Temp      Pulse 69 (02/14/17 1200)   Resp 16 (02/14/17 1200)    SpO2 100 % (02/14/17 1200)      Post Anesthesia Care and Evaluation    Patient location during evaluation: PACU  Patient participation: complete - patient participated  Level of consciousness: awake and alert  Pain management: adequate  Airway patency: patent  Anesthetic complications: No anesthetic complications    Cardiovascular status: acceptable  Respiratory status: acceptable  Hydration status: acceptable

## 2017-02-22 ENCOUNTER — DOCUMENTATION (OUTPATIENT)
Dept: PHYSICAL THERAPY | Facility: CLINIC | Age: 72
End: 2017-02-22

## 2017-02-27 ENCOUNTER — OFFICE VISIT (OUTPATIENT)
Dept: ORTHOPEDIC SURGERY | Facility: CLINIC | Age: 72
End: 2017-02-27

## 2017-02-27 VITALS — TEMPERATURE: 97.6 F | WEIGHT: 183 LBS | BODY MASS INDEX: 27.11 KG/M2 | HEIGHT: 69 IN

## 2017-02-27 DIAGNOSIS — Z96.651 STATUS POST TOTAL RIGHT KNEE REPLACEMENT: Primary | ICD-10-CM

## 2017-02-27 PROCEDURE — 99024 POSTOP FOLLOW-UP VISIT: CPT | Performed by: NURSE PRACTITIONER

## 2017-02-27 PROCEDURE — 73560 X-RAY EXAM OF KNEE 1 OR 2: CPT | Performed by: NURSE PRACTITIONER

## 2017-02-27 RX ORDER — OXYCODONE AND ACETAMINOPHEN 7.5; 325 MG/1; MG/1
TABLET ORAL
Qty: 100 TABLET | Refills: 0 | Status: SHIPPED | OUTPATIENT
Start: 2017-02-27 | End: 2017-06-06

## 2017-02-27 NOTE — PATIENT INSTRUCTIONS
ASSESSMENT: 2 week status post right total knee replacement expected healing.    PLAN: 1) Staples removed and steri strips applied   2) Order given for PT and pain medicine (as needed)   3) Continue LUCY hose for four weeks   4) Continue ice PRN   5) Continue EC Aspirin 325mg by mouth twice a day until 6 weeks post op.   6) Follow up in 4 weeks with repeat Xrays of right knee (2 views)   7) Continue with antibiotic prophylaxis with dental procedures for 2 yrs post total joint replacement.    Shavon Soto, APRN  2/27/2017     Pain Medications utilized after surgery are narcotics and the law requires that the following information be given to all patients that are prescribed narcotics:    CLASSIFICATION: Pain medications are called Opioids and are narcotics  LEGALITIES: It is illegal to share narcotics with others and to drive within 4 hours of taking narcotics  POTENTIAL SIDE EFFECTS: Potential side effects of opioids include: nausea, vomiting, itching, dizziness, drowsiness, dry mouth, constipation, and difficulty urinating.  POTENTIAL ADVERSE EFFECTS:   Opioid tolerance can develop with use of pain medications and this simply means that it requires more and more of the medication to control pain; however, this is seen more in patients that use opioids for longer periods of time.  Opioid dependence can develop with use of Opioids and this simply means that to stop the medication can cause withdrawal symptoms so wean gradually (do not stop all at once); however, this is seen more with patients that use opioids for longer periods of time.  Opioid addiction can develop with use of Opioids and the incidence of this is very unlikely in patients who take the medications as ordered and stop the medications as instructed.  Opioid overdose can be dangerous, but is unlikely when the medication is taken as ordered and stopped when ordered. It is important not to mix opioids with alcohol or with and type of sedative such as  Benadryl as this can lead to over sedation and respiratory difficulty.    DOSAGE:   Pain medications will need to be taken consistently for the first week to decrease pain and promote adequate pain relief and participation in physical therapy.    After the initial surgical pain begins to resolve, you may begin to decrease the pain medication. By the end of 8 weeks, you should be off of pain medications.    Refills will not be given by the office during evening hours, on weekends.  To seek refills on pain medications during the initial 6 week post-operative period, you must call the office 48 hours in advance to request the refill. The office will then notify you when to  the prescription. DO NOT wait until you are out of the medication to request a refill.  Knee Rehabilitation Guidelines Following Surgery  After knee surgery, it is important to follow instructions from your health care provider about range-of-motion (ROM) and muscle strengthening exercises. This will improve your surgery results. If the exercises cause you to have pain or swelling in your knee joint, do them less often until you can do them without pain. Then, slowly increase how often you do your exercises. If you have problems or questions, talk with your health care provider or physical therapist. You should start exercising as soon as your health care provider or physical therapist says it is okay.  HOME CARE INSTRUCTIONS  Activity  · Use your crutches or walker as told by your health care provider.  · Do not lift anything that is heavier than 10 lb (4.5 kg) and do not play contact sports until your health care provider says it is okay.  · Return to your normal activities as told by your health care provider. Ask your health care provider what activities are safe for you.  · Return to work as told by your health care provider.  · Do not drive a car for six weeks or as told by your health care provider.  General Instructions  · Take  over-the-counter and prescription medicines only as told by your health care provider.  · Protect your knee during the recovery period to keep it from getting injured again.  · You may take sponge baths. Do not take showers or tub baths until your health care provider says it is okay.  · Remove throw rugs and tripping hazards from the floor.  · Wear elastic stockings for as long as your health care provider instructs you to.  · Keep all follow-up visits as told by your health care provider. This is important.  RANGE-OF-MOTION AND STRENGTHENING EXERCISES  Do your exercises as told by your health care provider or physical therapist.  Before You Exercise  · Put a towel between your thigh and a heat pack or heating pad.  · Leave the heat on your thigh muscle for 20-30 minutes before you exercise.  Leg Lifts  While your knee is still in a splint or a cast, you can do straight-leg raises. Repeat this exercise 10-20 times, 2-3 times per day. As your knee gets better, do this exercise against resistance.  1. Lie flat on your back.  2. Lift the leg about 6 inches. Keep it raised for 3 seconds.  3. Slowly lower the leg.  Quad Sets  Repeat this exercise 10-20 times every hour.  1. Lie flat on your back.  2. Tighten your thigh muscle (quad).  3. Keep the muscle tight for 5-10 seconds.  Hamstring Sets  Repeat this exercise 10-20 times every hour.  1. Push your foot backward against an object that does not move.  2. Keep pushing your foot against it for 5-10 seconds.  Weight-Resistance Exercises  Weight-resistance exercises are another important part of rehabilitation. These exercises strengthen your muscles by making them work against resistance. Examples include using:  · Free weights.  · Weight-lifting machines.  · Resistance bands.  Aerobic Exercises  Aerobic exercise keeps joints and muscles moving. It involves large muscle groups. It is also rhythmic in nature and is done for a longer period. Doing these exercises improves  circulation and endurance. Your health care provider may have you start by taking a 20-30 minute walk, 2 times per day. Examples of aerobic exercise include:  · Swimming.  · Walking.  · Hiking.  · Jogging.  · Cross-country skiing.  · Bike riding.     This information is not intended to replace advice given to you by your health care provider. Make sure you discuss any questions you have with your health care provider.     Document Released: 12/18/2006 Document Revised: 05/03/2016 Document Reviewed: 12/14/2015  [employer] Patient Education ©2016 [employer]

## 2017-02-27 NOTE — PROGRESS NOTES
Carolyn Nicolas : 1945 MRN: [id number] DATE: 2017  Body mass index is 27.42 kg/(m^2).  Vitals:    17 0906   Temp: 97.6 °F (36.4 °C)       DIAGNOSIS: 2 week follow up right total knee arthroplasty    SUBJECTIVE:Patient returns today for 2 week follow up of right total knee replacement. Patient reports doing well with no unusual complaints. Appears to be progressing appropriately.    OBJECTIVE:   Exam:. The incision is healing appropriately. No sign of infection. Range of motion is progressing as expected -. The calf is soft and nontender with a negative Homans sign.    DIAGNOSTIC STUDIES  Xrays: 2 views of the right knee (AP full length and lateral) were ordered and reviewed for evaluation of recent knee replacement. They demonstrate a well positioned, well aligned knee replacement without complicating factors noted. In comparison with previous films there has been interval implant placement.    ASSESSMENT: 2 week status post right total knee replacement expected healing.    PLAN: 1) Staples removed and steri strips applied   2) Order given for PT and pain medicine (as needed)   3) Continue LUCY hose for four weeks   4) Continue ice PRN   5) Continue EC Aspirin 325mg by mouth twice a day until 6 weeks post op.   6) Follow up in 4 weeks with repeat Xrays of right knee (2 views)   7) Continue with antibiotic prophylaxis with dental procedures for 2 yrs post total joint replacement.    Shavon Soto, APRN  2017     Pain Medications utilized after surgery are narcotics and the law requires that the following information be given to all patients that are prescribed narcotics:    CLASSIFICATION: Pain medications are called Opioids and are narcotics  LEGALITIES: It is illegal to share narcotics with others and to drive within 4 hours of taking narcotics  POTENTIAL SIDE EFFECTS: Potential side effects of opioids include: nausea, vomiting, itching, dizziness, drowsiness, dry mouth, constipation,  and difficulty urinating.  POTENTIAL ADVERSE EFFECTS:   Opioid tolerance can develop with use of pain medications and this simply means that it requires more and more of the medication to control pain; however, this is seen more in patients that use opioids for longer periods of time.  Opioid dependence can develop with use of Opioids and this simply means that to stop the medication can cause withdrawal symptoms so wean gradually (do not stop all at once); however, this is seen more with patients that use opioids for longer periods of time.  Opioid addiction can develop with use of Opioids and the incidence of this is very unlikely in patients who take the medications as ordered and stop the medications as instructed.  Opioid overdose can be dangerous, but is unlikely when the medication is taken as ordered and stopped when ordered. It is important not to mix opioids with alcohol or with and type of sedative such as Benadryl as this can lead to over sedation and respiratory difficulty.    DOSAGE:   Pain medications will need to be taken consistently for the first week to decrease pain and promote adequate pain relief and participation in physical therapy.    After the initial surgical pain begins to resolve, you may begin to decrease the pain medication. By the end of 8 weeks, you should be off of pain medications.    Refills will not be given by the office during evening hours, on weekends.  To seek refills on pain medications during the initial 6 week post-operative period, you must call the office 48 hours in advance to request the refill. The office will then notify you when to  the prescription. DO NOT wait until you are out of the medication to request a refill.

## 2017-03-07 ENCOUNTER — TREATMENT (OUTPATIENT)
Dept: PHYSICAL THERAPY | Facility: CLINIC | Age: 72
End: 2017-03-07

## 2017-03-07 DIAGNOSIS — Z96.651 STATUS POST TOTAL RIGHT KNEE REPLACEMENT: ICD-10-CM

## 2017-03-07 DIAGNOSIS — R26.9 GAIT DIFFICULTY: Primary | ICD-10-CM

## 2017-03-07 DIAGNOSIS — M25.561 ACUTE PAIN OF RIGHT KNEE: ICD-10-CM

## 2017-03-07 PROCEDURE — 97110 THERAPEUTIC EXERCISES: CPT | Performed by: PHYSICAL THERAPIST

## 2017-03-07 PROCEDURE — 97161 PT EVAL LOW COMPLEX 20 MIN: CPT | Performed by: PHYSICAL THERAPIST

## 2017-03-07 NOTE — PROGRESS NOTES
Physical Therapy Initial Evaluation and Plan of Care    Patient: Carolyn Nicolas   : 1945  Diagnosis/ICD-10 Code:  Status post total right knee replacement [Z96.651]  Referring practitioner: NAOMI Cristobal  Past medical Hx reviewed: 3/7/2017  Subjective Evaluation    History of Present Illness  Date of surgery: 2017  Mechanism of injury: I had knee replacement surgery and then went to [facility] for rehab 17 to 17.  Over the last week I have been home trying to work on my exercises. The knee still swells and gets stiff depending on my activity.      Current level of function;  Transfers (getting out of the car is more difficult) chairs require arms.  Getting in/out of bed is okay but no change of position at night.  Donning shoes/socks for LE can be difficult.  Doing fine showering and toileting.          Pain  Current pain ratin  At best pain ratin  At worst pain ratin (At night after walking on it too much. )  Quality: tight, dull ache and throbbing (Deep ache.  Occasionally electric nerve shocks.  )  Relieving factors: ice, rest, relaxation and medications (Off and on prescription and tylenol typically. )  Progression: improved    Social Support  Lives in: multiple-level home (Basement stairs and 4 to enter.  Leading with L leg up)  Lives with: alone    Treatments  Previous treatment: physical therapy (Rehab )  Patient Goals  Patient goals for therapy: decreased edema, decreased pain, improved balance, increased motion, increased strength and independence with ADLs/IADLs        Objective     Static Posture     Knee   Knee (Left): Flexed.     Observations     Right Knee   Positive for adhesive scar (mild puckering of center 1/3 of scar.) and edema.     Additional Observation Details  All steri-strips in tact.      Active Range of Motion     Right Knee   Flexion: 90 (Supine ) degrees with pain    Strength/Myotome Testing     Right Knee   Flexion: 3+  Prone flexion:  3  Extension: 4    Additional Strength Details  Hip flexion: R: 3+   Hip ABd: R: 4/5   Hip Add: R: 4/5   Hip Ext: R: 4/5      Functional Assessment     Single Leg Stance   Right: 5 seconds    Comments  TU sec.    30 sec. Sit to stand:  6x    Assessment & Plan     Assessment  Impairments: abnormal coordination, abnormal gait, abnormal muscle firing, abnormal or restricted ROM, activity intolerance, impaired balance, impaired physical strength and pain with function  Assessment details: Pt will benefit from skilled PT intervention to address deficits outlined above and to regain safe independence.      Prognosis: good  Prognosis details:     ST-7 visits   1. Patient to be compliant with stretching and HEP  Able to sit > 30 then stand with < 4/10 pain.   2. R MMT 4+/5 for ability to ascend/descend 10 steps alternating and transfer sit to stand x 10 with knee pain <4/10.    3. Increased hip joint, patellar mobility to allow for decreased stress at tibiofemoral, patellofemoral joint. (knee ROM 18°-105°)  4. Pt. to exhibit increased LE soft tissue extensibility to allow for increased ease with walking 10 minutes unsupported.   5.  Pt to exhibit improved stability of knee to balance on L 5 sec to promote safety outdoors and uneven terrain.      LT-14 visits   1.  Pt. to score >85% of disability on WOMAC.   2.  L LE strength to at least 5-/5 to ascend/descend 15 steps without pain >2/10 and no UE.  3.  Pt to exhibit improved Knee PROM 2°-120° to allow for improved gait, kneeling, bending squatting as is necessary for community tasks.    4.  Pt to exhibit LE endurance/strength to 5-/5 to allow for returning to unrestricted walking > 30 min.    5. Decrease TUG time to <10sec, increase 30 sec sit to stand to >12x with UE and even foot placement to demonstrate improved function.     6.  Pt to demonstrate increased stability of the knee to balance on L for 10 as needed to traverse uneven terrain.       Plan  Therapy options: will be seen for skilled physical therapy services  Planned modality interventions: thermotherapy (hydrocollator packs), TENS and cryotherapy  Planned therapy interventions: manual therapy, neuromuscular re-education, postural training, soft tissue mobilization, joint mobilization, home exercise program, gait training, functional ROM exercises, flexibility, balance/weight-bearing training, strengthening, stretching, therapeutic activities and transfer training  Frequency: 3x week  Duration in weeks: 5  Treatment plan discussed with: patient    Manual Therapy:    -     mins  32799;  Therapeutic Exercise:    10     mins  84805;     Neuromuscular Kelle:    -    mins  87012;    Therapeutic Activity:     -     mins  92695;     Gait Training:      -     mins  56547;     Ultrasound:     -     mins  64822;    Electrical Stimulation:    -     mins  37708 ( );  Dry Needling     -     mins self-pay    Timed Treatment:   10   mins   Total Treatment:     56   mins    PT SIGNATURE: SARAHI Echols License #: [id number]    DATE TREATMENT INITIATED: 3/7/2017    Initial Certification  Certification Period: 6/5/2017  I certify that the therapy services are furnished while this patient is under my care.  The services outlined above are required by this patient, and will be reviewed every 90 days.     PHYSICIAN: Shavon Soto, APRN      DATE:     Please sign and return via fax to [phone].. Thank you, [facility].

## 2017-03-08 ENCOUNTER — TREATMENT (OUTPATIENT)
Dept: PHYSICAL THERAPY | Facility: CLINIC | Age: 72
End: 2017-03-08

## 2017-03-08 DIAGNOSIS — R26.9 GAIT DIFFICULTY: ICD-10-CM

## 2017-03-08 DIAGNOSIS — M25.561 ACUTE PAIN OF RIGHT KNEE: ICD-10-CM

## 2017-03-08 DIAGNOSIS — Z96.651 STATUS POST TOTAL RIGHT KNEE REPLACEMENT: Primary | ICD-10-CM

## 2017-03-08 PROCEDURE — 97110 THERAPEUTIC EXERCISES: CPT | Performed by: PHYSICAL THERAPIST

## 2017-03-08 PROCEDURE — 97140 MANUAL THERAPY 1/> REGIONS: CPT | Performed by: PHYSICAL THERAPIST

## 2017-03-09 NOTE — PROGRESS NOTES
Physical Therapy Daily Progress Note  Visits:2    Subjective : Carolyn Fidencio reports: I brought my exercise sheets to review today.  I left my walker at home and brought my cane.  I get around pretty well.  I just feel like the hamstrings are what give me the toughest time.    Objective   See Exercise, Manual, and Modality Logs for complete treatment.   Assessment/Plan:  Pt was educated on the functional shortening of LE mm due to long period of knee restriction prior to sx.  She tolerated all TE very well.  We did clarify her her current HEP, which did eliminate/progress some of her current activity.      Progress per Plan of Care       Manual Therapy:    8     mins  51840;  Therapeutic Exercise:    40     mins  26655;     Neuromuscular Kelle:    5    mins  67653;    Therapeutic Activity:     -     mins  72992;     Gait Training:      -     mins  78631;     Ultrasound:     -     mins  60078;    Electrical Stimulation:    -     mins  33951 ( );  Dry Needling     -     mins self-pay    Timed Treatment:   53   mins   Total Treatment:     65   mins    SARAHI Echols License #[id number]    Physical Therapist

## 2017-03-10 ENCOUNTER — TREATMENT (OUTPATIENT)
Dept: PHYSICAL THERAPY | Facility: CLINIC | Age: 72
End: 2017-03-10

## 2017-03-10 DIAGNOSIS — M25.561 ACUTE PAIN OF RIGHT KNEE: ICD-10-CM

## 2017-03-10 DIAGNOSIS — R26.9 GAIT DIFFICULTY: ICD-10-CM

## 2017-03-10 DIAGNOSIS — Z96.651 STATUS POST TOTAL RIGHT KNEE REPLACEMENT: Primary | ICD-10-CM

## 2017-03-10 PROCEDURE — 97140 MANUAL THERAPY 1/> REGIONS: CPT | Performed by: PHYSICAL THERAPIST

## 2017-03-10 PROCEDURE — 97110 THERAPEUTIC EXERCISES: CPT | Performed by: PHYSICAL THERAPIST

## 2017-03-10 NOTE — PROGRESS NOTES
Physical Therapy Daily Progress Note  Visits:3    Subjective : Carolyn Nicolas reports: I did very well after last visit.  I didn't hurt too much.  I only had to take a tylenol.  Yesterday I did walk a lot. I was out pretty much all day.  The knee is pretty swollen today, down to the foot.    Objective: Kinesiotape basketweave for swelling still in tact.  Longer socks donned today for better edema control.  R medial head of gastrocs: TTP: 2+.    See Exercise, Manual, and Modality Logs for complete treatment.   Assessment/Plan:  Pt did very well with PT intervention.  She did have reduction in edema following elevation, effleurage and icing.    Progress per Plan of Care       Manual Therapy:    12     mins  45596;  Therapeutic Exercise:    40     mins  46882;     Neuromuscular Kelle:    -    mins  41621;    Therapeutic Activity:     -     mins  59214;     Gait Training:      -     mins  11510;     Ultrasound:     -     mins  59245;    Electrical Stimulation:    -     mins  06386 ( );  Dry Needling     -     mins self-pay    Timed Treatment:   52   mins   Total Treatment:     65   mins    SARAHI Echols License #[id number]    Physical Therapist

## 2017-03-13 ENCOUNTER — TREATMENT (OUTPATIENT)
Dept: PHYSICAL THERAPY | Facility: CLINIC | Age: 72
End: 2017-03-13

## 2017-03-13 DIAGNOSIS — M25.561 ACUTE PAIN OF RIGHT KNEE: ICD-10-CM

## 2017-03-13 DIAGNOSIS — R26.9 GAIT DIFFICULTY: ICD-10-CM

## 2017-03-13 DIAGNOSIS — Z96.651 STATUS POST TOTAL RIGHT KNEE REPLACEMENT: Primary | ICD-10-CM

## 2017-03-13 PROCEDURE — 97140 MANUAL THERAPY 1/> REGIONS: CPT | Performed by: PHYSICAL THERAPIST

## 2017-03-13 PROCEDURE — 97110 THERAPEUTIC EXERCISES: CPT | Performed by: PHYSICAL THERAPIST

## 2017-03-13 NOTE — PROGRESS NOTES
Physical Therapy Daily Progress Note  Visits:4    Subjective : Carolyn Nioclas reports: I've been stiff this weekend.  The weather really gets the knee stiff.    Objective :Gait: no AD and adequate swing through  See Exercise, Manual, and Modality Logs for complete treatment.   Assessment/Plan:  Pt tolerates treatment well and demonstrated near full extension with PROM.  AROM continues to be a challenge.      Progress per Plan of Care       Manual Therapy:    12     mins  90535;  Therapeutic Exercise:    40     mins  74312;     Neuromuscular Kelle:    -    mins  48553;    Therapeutic Activity:     -     mins  71078;     Gait Training:      -     mins  85635;     Ultrasound:     --     mins  81971;    Electrical Stimulation:    -     mins  08870 ( );  Dry Needling     -     mins self-pay    Timed Treatment:   52   mins   Total Treatment:     65   mins    SARAHI Echols License #[id number]    Physical Therapist

## 2017-03-17 ENCOUNTER — TREATMENT (OUTPATIENT)
Dept: PHYSICAL THERAPY | Facility: CLINIC | Age: 72
End: 2017-03-17

## 2017-03-17 DIAGNOSIS — R26.9 GAIT DIFFICULTY: ICD-10-CM

## 2017-03-17 DIAGNOSIS — Z96.651 STATUS POST TOTAL RIGHT KNEE REPLACEMENT: Primary | ICD-10-CM

## 2017-03-17 DIAGNOSIS — M25.561 ACUTE PAIN OF RIGHT KNEE: ICD-10-CM

## 2017-03-17 PROCEDURE — 97110 THERAPEUTIC EXERCISES: CPT | Performed by: PHYSICAL THERAPIST

## 2017-03-17 PROCEDURE — 97140 MANUAL THERAPY 1/> REGIONS: CPT | Performed by: PHYSICAL THERAPIST

## 2017-03-19 NOTE — PROGRESS NOTES
Physical Therapy Daily Progress Note  Visits:5    Subjective : Carolyn Nicolas reports: I feel like the knee is getting better and the swelling is less, but still there.    Objective   See Exercise, Manual, and Modality Logs for complete treatment.   Assessment/Plan: Todays visit focused on balance progression.  She tolerated treatment well.   Progress per Plan of Care       Manual Therapy:    10     mins  92818;  Therapeutic Exercise:    25     mins  51981;     Neuromuscular Kelle:    6    mins  02137;    Therapeutic Activity:          mins  15084;     Gait Training:           mins  51247;     Ultrasound:          mins  82891;    Electrical Stimulation:         mins  28877 ( );  Dry Needling          mins self-pay    Timed Treatment:   41   mins   Total Treatment:     68   mins    SARAHI Echols License #[id number]    Physical Therapist

## 2017-03-20 ENCOUNTER — TREATMENT (OUTPATIENT)
Dept: PHYSICAL THERAPY | Facility: CLINIC | Age: 72
End: 2017-03-20

## 2017-03-20 DIAGNOSIS — R26.9 GAIT DIFFICULTY: ICD-10-CM

## 2017-03-20 DIAGNOSIS — Z96.651 STATUS POST TOTAL RIGHT KNEE REPLACEMENT: Primary | ICD-10-CM

## 2017-03-20 DIAGNOSIS — M25.561 ACUTE PAIN OF RIGHT KNEE: ICD-10-CM

## 2017-03-20 PROCEDURE — 97110 THERAPEUTIC EXERCISES: CPT | Performed by: PHYSICAL THERAPIST

## 2017-03-20 NOTE — PROGRESS NOTES
Physical Therapy Daily Progress Note  Visits:6    Subjective : Carolyn Nicolas reports: I'm really stiff today.  Weekend usually make me pretty tight.    Objective   See Exercise, Manual, and Modality Logs for complete treatment.   Assessment/Plan:  Pt continues to do well with PT intervention.  Balance reactions improving with practice.      Progress per Plan of Care       Manual Therapy:    -     mins  84461;  Therapeutic Exercise:    30     mins  69038;     Neuromuscular Kelle:    6    mins  65594;    Therapeutic Activity:     -     mins  18824;     Gait Training:      -     mins  42654;     Ultrasound:     -     mins  21085;    Electrical Stimulation:    -     mins  11472 ( );  Dry Needling     -     mins self-pay    Timed Treatment:   41   mins   Total Treatment:     68   mins    Anthony Howell PT  [place] License #[id number]    Physical Therapist

## 2017-03-22 ENCOUNTER — TREATMENT (OUTPATIENT)
Dept: PHYSICAL THERAPY | Facility: CLINIC | Age: 72
End: 2017-03-22

## 2017-03-22 DIAGNOSIS — R26.9 GAIT DIFFICULTY: ICD-10-CM

## 2017-03-22 DIAGNOSIS — Z96.651 STATUS POST TOTAL RIGHT KNEE REPLACEMENT: Primary | ICD-10-CM

## 2017-03-22 DIAGNOSIS — M25.561 ACUTE PAIN OF RIGHT KNEE: ICD-10-CM

## 2017-03-22 PROCEDURE — 97110 THERAPEUTIC EXERCISES: CPT | Performed by: PHYSICAL THERAPIST

## 2017-03-22 PROCEDURE — 97140 MANUAL THERAPY 1/> REGIONS: CPT | Performed by: PHYSICAL THERAPIST

## 2017-03-23 NOTE — PROGRESS NOTES
Physical Therapy Daily Progress Note  Visits:7    Subjective : Carolyn Nicolas reports: I got some more of my steri strips off and noticed that I still have a staple in the knee.  I go see the doctor early next week and I'll let him know about it.  (Offered to have nursing remove the staple for her from in the building but she refused).      Objective   See Exercise, Manual, and Modality Logs for complete treatment.   Assessment/Plan: Pt tolerated treatment well and with PROM she is able to achieve near full knee extension but continues to be painful.  Knee flexion is also improving with PROM but mm guard is most limiting factor.  Soft tissue and joint mobility has good soft end feel vs. Hard or firm premature to full ROM.  Balance is progressing nicely.      Progress per Plan of Care       Manual Therapy:    10     mins  41967;  Therapeutic Exercise:    38     mins  04691;     Neuromuscular Kelle:    6    mins  50344;    Therapeutic Activity:     -     mins  99007;     Gait Training:      -     mins  74186;     Ultrasound:     -     mins  87202;    Electrical Stimulation:    -     mins  87769 ( );  Dry Needling     -     mins self-pay    Timed Treatment:   54   mins   Total Treatment:     66   mins    Anthony Howell PT  [place] License #[id number]    Physical Therapist

## 2017-03-24 ENCOUNTER — TREATMENT (OUTPATIENT)
Dept: PHYSICAL THERAPY | Facility: CLINIC | Age: 72
End: 2017-03-24

## 2017-03-24 DIAGNOSIS — M25.561 ACUTE PAIN OF RIGHT KNEE: ICD-10-CM

## 2017-03-24 DIAGNOSIS — R26.9 GAIT DIFFICULTY: ICD-10-CM

## 2017-03-24 DIAGNOSIS — Z96.651 STATUS POST TOTAL RIGHT KNEE REPLACEMENT: Primary | ICD-10-CM

## 2017-03-24 PROCEDURE — 97110 THERAPEUTIC EXERCISES: CPT | Performed by: PHYSICAL THERAPIST

## 2017-03-24 PROCEDURE — 97140 MANUAL THERAPY 1/> REGIONS: CPT | Performed by: PHYSICAL THERAPIST

## 2017-03-24 NOTE — PROGRESS NOTES
Physical Therapy Daily Progress Note  Visits:8    Subjective : Carolyn Nicolas reports: Doing okay, the incision area and around it continue to pretty tender/sore.   Objective : No redness, excessive edema beyond expected amount, no drainage and no excessive skin warmth.   See Exercise, Manual, and Modality Logs for complete treatment.   Assessment/Plan: Pt tolerated TE and progression well.  We were able to achieve full knee extension in PROM today.  The joint has the ability to achieve good ROM but mm tension limits maintained knee ROM     Progress per Plan of Care       Manual Therapy:    12     mins  56624;  Therapeutic Exercise:    25     mins  32415;     Neuromuscular Kelle:    6    mins  50508;    Therapeutic Activity:     -     mins  85846;     Gait Training:      -     mins  26778;     Ultrasound:     -     mins  10118;    Electrical Stimulation:    -     mins  16209 ( );  Dry Needling     -     mins self-pay    Timed Treatment:   43   mins   Total Treatment:     68   mins    SARAHI Echols License #[id number]    Physical Therapist

## 2017-03-27 ENCOUNTER — TREATMENT (OUTPATIENT)
Dept: PHYSICAL THERAPY | Facility: CLINIC | Age: 72
End: 2017-03-27

## 2017-03-27 ENCOUNTER — OFFICE VISIT (OUTPATIENT)
Dept: ORTHOPEDIC SURGERY | Facility: CLINIC | Age: 72
End: 2017-03-27

## 2017-03-27 VITALS — TEMPERATURE: 98.5 F | BODY MASS INDEX: 27.19 KG/M2 | WEIGHT: 179.4 LBS | HEIGHT: 68 IN

## 2017-03-27 DIAGNOSIS — Z96.651 STATUS POST TOTAL RIGHT KNEE REPLACEMENT: Primary | ICD-10-CM

## 2017-03-27 DIAGNOSIS — R26.9 GAIT DIFFICULTY: ICD-10-CM

## 2017-03-27 PROCEDURE — 99024 POSTOP FOLLOW-UP VISIT: CPT | Performed by: ORTHOPAEDIC SURGERY

## 2017-03-27 PROCEDURE — 73560 X-RAY EXAM OF KNEE 1 OR 2: CPT | Performed by: ORTHOPAEDIC SURGERY

## 2017-03-27 PROCEDURE — 97112 NEUROMUSCULAR REEDUCATION: CPT | Performed by: PHYSICAL THERAPIST

## 2017-03-27 PROCEDURE — 97110 THERAPEUTIC EXERCISES: CPT | Performed by: PHYSICAL THERAPIST

## 2017-03-27 NOTE — PROGRESS NOTES
Physical Therapy Daily Progress Note  Visits:9    Subjective : Carolyn Nicolas reports: I was able to work in my yard today and felt a little pulling in the back of the knee but did pretty good.  Over the weekend, I did a little walking but not bad.    Objective   See Exercise, Manual, and Modality Logs for complete treatment.   Assessment/Plan:  Pt tolerated treatment well and was able to perform step downs with use of UE to help control descent.  Will focus on traversing stairs during last week of PT.     Progress per Plan of Care       Manual Therapy:    -     mins  74106;  Therapeutic Exercise:    25     mins  81901;     Neuromuscular Kelle:    8    mins  38882;    Therapeutic Activity:     -     mins  47438;     Gait Trainin     mins  10073;     Ultrasound:     -     mins  04159;    Electrical Stimulation:    -     mins  59120 ( );  Dry Needling     -     mins self-pay    Timed Treatment:   38   mins   Total Treatment:     65   mins    SARAHI Echols License #[id number]    Physical Therapist

## 2017-03-27 NOTE — PROGRESS NOTES
Carolyn Nicolas : 1945 MRN: [id number] DATE: 3/27/2017  Body mass index is 27.28 kg/(m^2).  Vitals:    17 0923   Temp: 98.5 °F (36.9 °C)       Chief Complaint and HPI: 6 weeks follow up right total knee    SUBJECTIVE:Patient returns today for follow up of total joint replacement. Patient reports doing well with no unusual complaints. Appears to be progressing appropriately.    OBJECTIVE:   Exam:. The incision is healing appropriately. No sign of infection. Range of motion is progressing as expected. The calf is soft and nontender with a negative Homans sign.    DIAGNOSTIC STUDIES  Xrays: 2 views of the rightknee (AP full length and lateral) were ordered and reviewed for evaluation of recent joint replacement. They demonstrate a well positioned, well aligned total joint replacement without complicating factors noted. In comparison with previous films there has been no alignment change. Single staple that was removed.      ASSESSMENT: status post right total knee replacement expected healing.    PLAN: 1) Continue with PT exercises as prescribed   2) Follow up 3 MONTHS  WITH XRAYS (2 views of same joint).   3) Continue with antibiotic prophylaxis with dental procedures for 2 yrs post total joint replacement.   4) May discontinue Aspirin therapy from surgery at this time and resume medications, vitamins, and supplements you were taking before surgery.     Andrew Monae MD  3/27/2017     Pain Medications utilized after surgery are narcotics and the law requires that the following information be given to all patients that are prescribed narcotics:    CLASSIFICATION: Pain medications are called Opioids and are narcotics  LEGALITIES: It is illegal to share narcotics with others and to drive within 4 hours of taking narcotics  POTENTIAL SIDE EFFECTS: Potential side effects of opioids include: nausea, vomiting, itching, dizziness, drowsiness, dry mouth, constipation, and difficulty urinating.  POTENTIAL  ADVERSE EFFECTS:   Opioid tolerance can develop with use of pain medications and this simply means that it requires more and more of the medication to control pain; however, this is seen more in patients that use opioids for longer periods of time.  Opioid dependence can develop with use of Opioids and this simply means that to stop the medication can cause withdrawal symptoms so wean gradually (do not stop all at once); however, this is seen more with patients that use opioids for longer periods of time.  Opioid addiction can develop with use of Opioids and the incidence of this is very unlikely in patients who take the medications as ordered and stop the medications as instructed.  Opioid overdose can be dangerous, but is unlikely when the medication is taken as ordered and stopped when ordered. It is important not to mix opioids with alcohol or with and type of sedative such as Benadryl as this can lead to over sedation and respiratory difficulty.    DOSAGE:   Pain medications will need to be taken consistently for the first week to decrease pain and promote adequate pain relief and participation in physical therapy.    After the initial surgical pain begins to resolve, you may begin to decrease the pain medication. By the end of 8 weeks, you should be off of pain medications.    Refills will not be given by the office during evening hours, on weekends.  To seek refills on pain medications during the initial 6 week post-operative period, you must call the office 48 hours in advance to request the refill. The office will then notify you when to  the prescription. DO NOT wait until you are out of the medication to request a refill.

## 2017-03-29 ENCOUNTER — TREATMENT (OUTPATIENT)
Dept: PHYSICAL THERAPY | Facility: CLINIC | Age: 72
End: 2017-03-29

## 2017-03-29 DIAGNOSIS — Z96.651 STATUS POST TOTAL RIGHT KNEE REPLACEMENT: Primary | ICD-10-CM

## 2017-03-29 DIAGNOSIS — R26.9 GAIT DIFFICULTY: ICD-10-CM

## 2017-03-29 DIAGNOSIS — M25.561 ACUTE PAIN OF RIGHT KNEE: ICD-10-CM

## 2017-03-29 PROCEDURE — 97112 NEUROMUSCULAR REEDUCATION: CPT | Performed by: PHYSICAL THERAPIST

## 2017-03-29 PROCEDURE — 97110 THERAPEUTIC EXERCISES: CPT | Performed by: PHYSICAL THERAPIST

## 2017-03-29 PROCEDURE — 97140 MANUAL THERAPY 1/> REGIONS: CPT | Performed by: PHYSICAL THERAPIST

## 2017-03-29 NOTE — PROGRESS NOTES
Physical Therapy Daily Progress Note  Visits:10    Subjective : Carolyn Fidencio reports: I was up and down the stairs a lot yesterday.  I was doing laundry for the first time since Feb.  I was able to drive today.    Objective   See Exercise, Manual, and Modality Logs for complete treatment.   Assessment/Plan:  Pt did well with TE and progression but eccentric control heel taps required some UE assist due to weakness     Progress per Plan of Care       Manual Therapy:    10     mins  90608;  Therapeutic Exercise:    35     mins  17426;     Neuromuscular Kelle:    10    mins  15459;    Therapeutic Activity:     -     mins  64695;     Gait Training:      -     mins  13954;     Ultrasound:     -     mins  70633;    Electrical Stimulation:    -     mins  90564 ( );  Dry Needling     -     mins self-pay    Timed Treatment:   55   mins   Total Treatment:     69   mins    SARAHI Echols License #[id number]    Physical Therapist

## 2017-03-31 ENCOUNTER — TREATMENT (OUTPATIENT)
Dept: PHYSICAL THERAPY | Facility: CLINIC | Age: 72
End: 2017-03-31

## 2017-03-31 DIAGNOSIS — Z96.651 STATUS POST TOTAL RIGHT KNEE REPLACEMENT: Primary | ICD-10-CM

## 2017-03-31 DIAGNOSIS — R26.9 GAIT DIFFICULTY: ICD-10-CM

## 2017-03-31 DIAGNOSIS — M25.561 ACUTE PAIN OF RIGHT KNEE: ICD-10-CM

## 2017-03-31 PROCEDURE — 97110 THERAPEUTIC EXERCISES: CPT | Performed by: PHYSICAL THERAPIST

## 2017-03-31 PROCEDURE — 97161 PT EVAL LOW COMPLEX 20 MIN: CPT | Performed by: PHYSICAL THERAPIST

## 2017-04-04 ENCOUNTER — TREATMENT (OUTPATIENT)
Dept: PHYSICAL THERAPY | Facility: CLINIC | Age: 72
End: 2017-04-04

## 2017-04-04 DIAGNOSIS — Z96.651 STATUS POST TOTAL RIGHT KNEE REPLACEMENT: Primary | ICD-10-CM

## 2017-04-04 DIAGNOSIS — M25.561 ACUTE PAIN OF RIGHT KNEE: ICD-10-CM

## 2017-04-04 DIAGNOSIS — R26.9 GAIT DIFFICULTY: ICD-10-CM

## 2017-04-04 PROCEDURE — 97110 THERAPEUTIC EXERCISES: CPT | Performed by: PHYSICAL THERAPIST

## 2017-04-04 PROCEDURE — 97112 NEUROMUSCULAR REEDUCATION: CPT | Performed by: PHYSICAL THERAPIST

## 2017-04-04 NOTE — PROGRESS NOTES
Physical Therapy Daily Progress Note  Visits:11    Subjective : Carolyn Nicolas reports: Doing okay, just really stiff in the mornings.  I do notice that the ankle is not as swollen as it usually is, so that's getting better.    Objective   See Exercise, Manual, and Modality Logs for complete treatment.   Assessment/Plan:  Continue to progress program for strength and endurance.      Progress per Plan of Care       Manual Therapy:    10     mins  02786;  Therapeutic Exercise:    25     mins  49254;     Neuromuscular Kelle:    5    mins  25490;    Therapeutic Activity:     -     mins  38669;     Gait Training:      -     mins  81534;     Ultrasound:     -     mins  95380;    Electrical Stimulation:    -     mins  52030 ( );  Dry Needling     -     mins self-pay    Timed Treatment:   40   mins   Total Treatment:     70   mins    SARAHI Echols License #[id number]    Physical Therapist

## 2017-04-06 ENCOUNTER — TREATMENT (OUTPATIENT)
Dept: PHYSICAL THERAPY | Facility: CLINIC | Age: 72
End: 2017-04-06

## 2017-04-06 DIAGNOSIS — M25.561 ACUTE PAIN OF RIGHT KNEE: ICD-10-CM

## 2017-04-06 DIAGNOSIS — M17.0 PRIMARY OSTEOARTHRITIS OF BOTH KNEES: ICD-10-CM

## 2017-04-06 DIAGNOSIS — Z96.651 STATUS POST TOTAL RIGHT KNEE REPLACEMENT: Primary | ICD-10-CM

## 2017-04-06 DIAGNOSIS — R26.9 GAIT DIFFICULTY: ICD-10-CM

## 2017-04-06 PROCEDURE — 97112 NEUROMUSCULAR REEDUCATION: CPT | Performed by: PHYSICAL THERAPIST

## 2017-04-06 PROCEDURE — 97110 THERAPEUTIC EXERCISES: CPT | Performed by: PHYSICAL THERAPIST

## 2017-04-06 NOTE — PROGRESS NOTES
Physical Therapy Daily Progress Note  Visits:12    Subjective : Carolyn Nicolas reports: The knee feels really stiff today.  Like the back of the knee is pinching.    Objective   See Exercise, Manual, and Modality Logs for complete treatment.   Assessment/Plan:  Pt is far enough removed from sx to attempt moist heat for HS and posterior capsule of knee.  She responded very well to modality and was able to complete TE with very minimal complaints.      Progress per Plan of Care       Manual Therapy:    -     mins  19864;  Therapeutic Exercise:    35     mins  30588;     Neuromuscular Kelle:    12    mins  36857;    Therapeutic Activity:     -     mins  13170;     Gait Training:      -     mins  52353;     Ultrasound:     -     mins  45539;    Electrical Stimulation:    -     mins  91927 ( );  Dry Needling     -     mins self-pay    Timed Treatment:   47   mins   Total Treatment:     55   mins    SARAHI Echols License #[id number]    Physical Therapist

## 2017-04-10 ENCOUNTER — TREATMENT (OUTPATIENT)
Dept: PHYSICAL THERAPY | Facility: CLINIC | Age: 72
End: 2017-04-10

## 2017-04-10 DIAGNOSIS — Z96.651 STATUS POST TOTAL RIGHT KNEE REPLACEMENT: Primary | ICD-10-CM

## 2017-04-10 DIAGNOSIS — R26.9 GAIT DIFFICULTY: ICD-10-CM

## 2017-04-10 DIAGNOSIS — M25.561 ACUTE PAIN OF RIGHT KNEE: ICD-10-CM

## 2017-04-10 PROCEDURE — 97110 THERAPEUTIC EXERCISES: CPT | Performed by: PHYSICAL THERAPIST

## 2017-04-10 NOTE — PROGRESS NOTES
Physical Therapy Daily Progress Note  Visits:14    Subjective : Carolyn Nicolas reports: I worked out in the yard a lot yesterday using a shovel and pressing it down to dig up dirt. The knee did well while I was working but it swelled and was pretty sore after I was done.    Objective:   See Exercise, Manual, and Modality Logs for complete treatment.   Assessment/Plan:  Pt tolerated treatment well.  We did omit leg press today due to soreness with pressing motions.  Otherwise, she did well with TE.     Progress per Plan of Care       Manual Therapy:    -     mins  23210;  Therapeutic Exercise:    40     mins  22788;     Neuromuscular Kelle:    5    mins  14198;    Therapeutic Activity:     -     mins  56421;     Gait Training:      -     mins  16600;     Ultrasound:     -     mins  18246;    Electrical Stimulation:    -     mins  81719 ( );  Dry Needling     -     mins self-pay    Timed Treatment:   45   mins   Total Treatment:     70   mins    SARAHI Echols License #[id number]    Physical Therapist

## 2017-04-12 ENCOUNTER — TREATMENT (OUTPATIENT)
Dept: PHYSICAL THERAPY | Facility: CLINIC | Age: 72
End: 2017-04-12

## 2017-04-12 DIAGNOSIS — M25.561 ACUTE PAIN OF RIGHT KNEE: ICD-10-CM

## 2017-04-12 DIAGNOSIS — R26.9 GAIT DIFFICULTY: ICD-10-CM

## 2017-04-12 DIAGNOSIS — M17.0 PRIMARY OSTEOARTHRITIS OF BOTH KNEES: ICD-10-CM

## 2017-04-12 DIAGNOSIS — Z96.651 STATUS POST TOTAL RIGHT KNEE REPLACEMENT: Primary | ICD-10-CM

## 2017-04-12 PROCEDURE — 97110 THERAPEUTIC EXERCISES: CPT | Performed by: PHYSICAL THERAPIST

## 2017-04-12 PROCEDURE — 97112 NEUROMUSCULAR REEDUCATION: CPT | Performed by: PHYSICAL THERAPIST

## 2017-04-13 NOTE — PROGRESS NOTES
Physical Therapy Daily Progress Note  Visits:15    Subjective : Carolyn Nicolas reports: The knee continues to be very stiff.  The heat does seem to help loosen it up.    Objective   See Exercise, Manual, and Modality Logs for complete treatment.   Assessment/Plan:  Pt tolerated treatment well and is improving her ability to descend stairs with control of he R LE.      Progress per Plan of Care       Manual Therapy:    6     mins  05280;  Therapeutic Exercise:    25     mins  09493;     Neuromuscular Kelle:    8    mins  79709;    Therapeutic Activity:     -     mins  06040;     Gait Training:      -     mins  38608;     Ultrasound:     -     mins  65404;    Electrical Stimulation:    -     mins  38356 ( );  Dry Needling     -     mins self-pay    Timed Treatment:   39   mins   Total Treatment:     65   mins    Anthony Howell PT  [place] License #[id number]    Physical Therapist

## 2017-04-18 ENCOUNTER — TREATMENT (OUTPATIENT)
Dept: PHYSICAL THERAPY | Facility: CLINIC | Age: 72
End: 2017-04-18

## 2017-04-18 DIAGNOSIS — M25.561 ACUTE PAIN OF RIGHT KNEE: ICD-10-CM

## 2017-04-18 DIAGNOSIS — Z96.651 STATUS POST TOTAL RIGHT KNEE REPLACEMENT: Primary | ICD-10-CM

## 2017-04-18 DIAGNOSIS — R26.9 GAIT DIFFICULTY: ICD-10-CM

## 2017-04-18 PROCEDURE — 97112 NEUROMUSCULAR REEDUCATION: CPT | Performed by: PHYSICAL THERAPIST

## 2017-04-18 PROCEDURE — 97140 MANUAL THERAPY 1/> REGIONS: CPT | Performed by: PHYSICAL THERAPIST

## 2017-04-18 PROCEDURE — 97110 THERAPEUTIC EXERCISES: CPT | Performed by: PHYSICAL THERAPIST

## 2017-04-18 NOTE — PROGRESS NOTES
Physical Therapy Daily Progress Note  Visits:16    Subjective : Carolyn Nicolas reports: The heat really helps to loosen up the knee.      Objective   See Exercise, Manual, and Modality Logs for complete treatment.     Assessment/Plan:Pt tolerated manual intervention well.  PROM nearing neutral on 3rd round of stretching.  Moist heat and myofascial release to lateral HS mm was helpful today.      Progress per Plan of Care, Re-evaluate next visit     Manual Therapy:    12     mins  80045;  Therapeutic Exercise:    40     mins  09845;     Neuromuscular Kelle:    10    mins  17838;    Therapeutic Activity:     -     mins  82547;     Gait Training:      -     mins  90078;     Ultrasound:     -     mins  94652;    Electrical Stimulation:    -     mins  57701 ( );  Dry Needling     -     mins self-pay    Timed Treatment:   62   mins   Total Treatment:     74   mins    SARAHI Echols License #[id number]    Physical Therapist

## 2017-04-20 ENCOUNTER — TREATMENT (OUTPATIENT)
Dept: PHYSICAL THERAPY | Facility: CLINIC | Age: 72
End: 2017-04-20

## 2017-04-20 DIAGNOSIS — M25.561 ACUTE PAIN OF RIGHT KNEE: ICD-10-CM

## 2017-04-20 DIAGNOSIS — Z96.651 STATUS POST TOTAL RIGHT KNEE REPLACEMENT: Primary | ICD-10-CM

## 2017-04-20 DIAGNOSIS — R26.9 GAIT DIFFICULTY: ICD-10-CM

## 2017-04-20 DIAGNOSIS — M17.0 PRIMARY OSTEOARTHRITIS OF BOTH KNEES: ICD-10-CM

## 2017-04-20 PROCEDURE — 97110 THERAPEUTIC EXERCISES: CPT | Performed by: PHYSICAL THERAPIST

## 2017-04-20 PROCEDURE — 97112 NEUROMUSCULAR REEDUCATION: CPT | Performed by: PHYSICAL THERAPIST

## 2017-04-20 PROCEDURE — 97140 MANUAL THERAPY 1/> REGIONS: CPT | Performed by: PHYSICAL THERAPIST

## 2017-04-20 NOTE — PROGRESS NOTES
Physical Therapy Daily Progress Note  Visits:17    Subjective : Carolyn Nicolas reports: The back of the knee feels better today.  Its a little tight but not sore like it was the other day     Objective   See Exercise, Manual, and Modality Logs for complete treatment.     Assessment/Plan:Pt tolerated manual stretching much better today.  We will re-assess for D/C next visit     Progress per Plan of Care         Manual Therapy:    12     mins  46882;  Therapeutic Exercise:    35     mins  85566;     Neuromuscular Kelle:    10    mins  32050;    Therapeutic Activity:     -     mins  62178;     Gait Training:      -     mins  17917;     Ultrasound:     -     mins  61701;    Electrical Stimulation:    -     mins  76695 ( );  Dry Needling     -     mins self-pay    Timed Treatment:   57   mins   Total Treatment:     78   mins    SARAHI Echols License #[id number]    Physical Therapist

## 2017-04-26 ENCOUNTER — TREATMENT (OUTPATIENT)
Dept: PHYSICAL THERAPY | Facility: CLINIC | Age: 72
End: 2017-04-26

## 2017-04-26 DIAGNOSIS — M17.0 PRIMARY OSTEOARTHRITIS OF BOTH KNEES: ICD-10-CM

## 2017-04-26 DIAGNOSIS — M25.561 ACUTE PAIN OF RIGHT KNEE: ICD-10-CM

## 2017-04-26 DIAGNOSIS — Z96.651 STATUS POST TOTAL RIGHT KNEE REPLACEMENT: Primary | ICD-10-CM

## 2017-04-26 DIAGNOSIS — R26.9 GAIT DIFFICULTY: ICD-10-CM

## 2017-04-26 PROCEDURE — 97110 THERAPEUTIC EXERCISES: CPT | Performed by: PHYSICAL THERAPIST

## 2017-04-26 NOTE — PROGRESS NOTES
Physical Therapy Discharge  Visits:18    Subjective : Carolyn Nicolas reports: Doing pretty good overall but it still gets stiff depending on how much I do.  I'm much better overall and it is better than the non-surgical knee.  I can do everything that I need to do but just takes a while to loosen up and it swells some after.      Objective     Active Range of Motion     Right Knee   Flexion: 108 degrees   Extension: 6 degrees     Passive Range of Motion     Right Knee   Flexion: 118 degrees   Extension: 2 degrees     Strength/Myotome Testing     Right Hip   Planes of Motion   Flexion: 5  Extension: 5  Abduction: 4+  Adduction: 5    Right Knee   Flexion: 5  Extension: 5     See Exercise, Manual, and Modality Logs for complete treatment.     Assessment & Plan     Assessment  Prognosis details: Pt has responded well to PT but continues to have some ROM restriction that seems to be limited more by mm shortening over years of joint hypomobility that is slow to respond to stretching.  Functionally, she traverses stairs, transfers, drives, and ambulates well over uneven terrain. She is safe and confident with her HEP and will do well with time.  No further skilled PT intervention required.      Plan  Therapy options: will not be seen for skilled physical therapy services  Treatment plan discussed with: patient         Manual Therapy:    5     mins  20275;  Therapeutic Exercise:    35     mins  39408;     Neuromuscular Kelle:    6    mins  43177;    Therapeutic Activity:     -     mins  03576;     Gait Training:      -     mins  89896;     Ultrasound:     -     mins  70767;    Electrical Stimulation:    -     mins  90151 ( );  Dry Needling     -     mins self-pay    Timed Treatment:   46   mins   Total Treatment:     50   mins    SARAHI Echols License #[id number]    Physical Therapist

## 2017-05-30 ENCOUNTER — OFFICE VISIT (OUTPATIENT)
Dept: ORTHOPEDIC SURGERY | Facility: CLINIC | Age: 72
End: 2017-05-30

## 2017-05-30 VITALS — BODY MASS INDEX: 27.28 KG/M2 | HEIGHT: 68 IN | TEMPERATURE: 97.6 F | WEIGHT: 180 LBS

## 2017-05-30 DIAGNOSIS — Z96.651 HISTORY OF TOTAL KNEE ARTHROPLASTY, RIGHT: Primary | ICD-10-CM

## 2017-05-30 DIAGNOSIS — M17.12 ARTHRITIS OF LEFT KNEE: ICD-10-CM

## 2017-05-30 PROBLEM — Z96.659 HISTORY OF TOTAL KNEE ARTHROPLASTY: Status: ACTIVE | Noted: 2017-05-30

## 2017-05-30 PROCEDURE — 99213 OFFICE O/P EST LOW 20 MIN: CPT | Performed by: NURSE PRACTITIONER

## 2017-05-30 PROCEDURE — 73560 X-RAY EXAM OF KNEE 1 OR 2: CPT | Performed by: NURSE PRACTITIONER

## 2017-05-30 PROCEDURE — 20610 DRAIN/INJ JOINT/BURSA W/O US: CPT | Performed by: NURSE PRACTITIONER

## 2017-05-30 RX ORDER — BUPIVACAINE HYDROCHLORIDE 2.5 MG/ML
2 INJECTION, SOLUTION INFILTRATION; PERINEURAL
Status: COMPLETED | OUTPATIENT
Start: 2017-05-30 | End: 2017-05-30

## 2017-05-30 RX ORDER — LIDOCAINE HYDROCHLORIDE 10 MG/ML
2 INJECTION, SOLUTION INFILTRATION; PERINEURAL
Status: COMPLETED | OUTPATIENT
Start: 2017-05-30 | End: 2017-05-30

## 2017-05-30 RX ORDER — METHYLPREDNISOLONE ACETATE 80 MG/ML
80 INJECTION, SUSPENSION INTRA-ARTICULAR; INTRALESIONAL; INTRAMUSCULAR; SOFT TISSUE
Status: COMPLETED | OUTPATIENT
Start: 2017-05-30 | End: 2017-05-30

## 2017-05-30 RX ORDER — GLIMEPIRIDE 2 MG/1
2 TABLET ORAL
COMMUNITY
End: 2017-08-30 | Stop reason: DRUGHIGH

## 2017-05-30 RX ADMIN — BUPIVACAINE HYDROCHLORIDE 2 ML: 2.5 INJECTION, SOLUTION INFILTRATION; PERINEURAL at 11:19

## 2017-05-30 RX ADMIN — LIDOCAINE HYDROCHLORIDE 2 ML: 10 INJECTION, SOLUTION INFILTRATION; PERINEURAL at 11:19

## 2017-05-30 RX ADMIN — METHYLPREDNISOLONE ACETATE 80 MG: 80 INJECTION, SUSPENSION INTRA-ARTICULAR; INTRALESIONAL; INTRAMUSCULAR; SOFT TISSUE at 11:19

## 2017-06-06 ENCOUNTER — OFFICE VISIT (OUTPATIENT)
Dept: CARDIOLOGY | Facility: CLINIC | Age: 72
End: 2017-06-06

## 2017-06-06 VITALS
HEART RATE: 72 BPM | DIASTOLIC BLOOD PRESSURE: 72 MMHG | WEIGHT: 182 LBS | BODY MASS INDEX: 27.67 KG/M2 | SYSTOLIC BLOOD PRESSURE: 106 MMHG

## 2017-06-06 DIAGNOSIS — I42.9 CARDIOMYOPATHY (HCC): Primary | ICD-10-CM

## 2017-06-06 DIAGNOSIS — R06.81 BREATHLESSNESS ON EXERTION: ICD-10-CM

## 2017-06-06 DIAGNOSIS — Z95.810 AUTOMATIC IMPLANTABLE CARDIOVERTER-DEFIBRILLATOR IN SITU: ICD-10-CM

## 2017-06-06 DIAGNOSIS — I25.10 CAD IN NATIVE ARTERY: ICD-10-CM

## 2017-06-06 DIAGNOSIS — I10 BENIGN ESSENTIAL HTN: ICD-10-CM

## 2017-06-06 PROCEDURE — 93000 ELECTROCARDIOGRAM COMPLETE: CPT | Performed by: INTERNAL MEDICINE

## 2017-06-06 PROCEDURE — 99213 OFFICE O/P EST LOW 20 MIN: CPT | Performed by: INTERNAL MEDICINE

## 2017-06-06 NOTE — PROGRESS NOTES
"Procedure   [facility]  Cardiology Progress Note    Patient Identification:  Name:Carolyn Nicolas  Age:71 y.o.  Sex: female  :  1945  MRN: [id number]           2017    Subjective:    Chief Complaint   Patient presents with   • Coronary Artery Disease       HPI     Ms Nicolas is  70-year-old white female with history of cardiomyopathy, Bi-V ICD followed by Dr. Ely, hypertension, hyperlipidemia who presents for cardiac follow-up. No chest pain. No orthopnea or PND. No dizziness, palpitations or syncope. No edema.Weight is stable, downward trend. Cholesterol studies are followed by Dr. Daniel. Her blood pressure is been running average 100 systolic with no associated symptoms. No shocks. Overall, her functional capacity is stable. She has no complaints.      Echocardiogram with Doppler shows LVEF 40-45%, was 50-55%. Medical provider, Dr. Ely office, told her take added carvedilol 6.25 mg to her morning regimen. She took medicine for about for 5 days, not able to tolerate because made her feel \"loopy \"she stopped taking the added dose, she better now. She will he takes Lasix if needed. Last dose about one week ago after she cut back from vacation. Prior, has been a long time taking Lasix dose because not needed.       She did have coronary stenting as well, has class 2 dyspnea on exertion. She had no further episodes of chest discomfort  She is also waiting for a knee surgery.    Review of Systems   Constitution: Positive for malaise/fatigue. Negative for chills and fever.   HENT: Negative for headaches.    Cardiovascular: Negative for chest pain, irregular heartbeat, leg swelling and palpitations.   Respiratory: Negative for shortness of breath and snoring.    Skin: Positive for color change.   Musculoskeletal: Positive for arthritis and joint pain.   Gastrointestinal: Negative for abdominal pain, nausea and vomiting.   Neurological: Positive for numbness. Negative for " dizziness and light-headedness.       The following portions of the patient's history were reviewed and updated as appropriate: allergies, current medications, past family history, past medical history, past social history,and problem list.    Past Medical History:   Diagnosis Date   • Anemia    • Arthritis    • Cataract    • CHF (congestive heart failure)    • Chronic kidney disease    • Coronary artery disease    • Diabetes mellitus    • Hypertension    • Implantable cardioverter-defibrillator discharge    • Osteopenia    • Shortness of breath        Past Surgical History:   Procedure Laterality Date   • CARDIAC DEFIBRILLATOR PLACEMENT     • CARDIAC DEFIBRILLATOR PLACEMENT     • CORONARY ANGIOPLASTY WITH STENT PLACEMENT  2010   • ENDOSCOPY AND COLONOSCOPY N/A 3/8/2016    Procedure: ESOPHAGOGASTRODUODENOSCOPY AND COLONOSCOPY;  Surgeon: Dwight Monae MD;  Location: [facility] ENDOSCOPY;  Service:    • EYE SURGERY     • KNEE ARTHROSCOPY Bilateral 2009 2007   • TOTAL KNEE ARTHROPLASTY Right 2/14/2017    Procedure: TOTAL KNEE ARTHROPLASTY WITH MARGE NAVIGATION;  Surgeon: Andrew Monae MD;  Location: [facility] MAIN OR;  Service:        Social History     Social History   • Marital status:      Spouse name: N/A   • Number of children: N/A   • Years of education: College     Occupational History   • Teacher Retired     Social History Main Topics   • Smoking status: Never Smoker   • Smokeless tobacco: Never Used   • Alcohol use No   • Drug use: No   • Sexual activity: Defer     Other Topics Concern   • Not on file     Social History Narrative       Family History   Problem Relation Age of Onset   • Diabetes Mother    • Heart disease Mother    • Heart disease Father    • Cancer Father 85     Bladder   • Cancer Brother 58     Bladder   • Heart disease Maternal Grandmother    • Heart disease Maternal Grandfather        Scheduled Meds:    Current Outpatient Prescriptions:   •  atorvastatin (LIPITOR) 20 MG tablet,  Take 20 mg by mouth Every Night., Disp: , Rfl:   •  B-D UF III MINI PEN NEEDLES 31G X 5 MM misc, INJECT 1 EACH INTO THE SKIN DAILY, Disp: , Rfl: 6  •  BD PEN NEEDLE JACOB U/F 32G X 4 MM misc, , Disp: , Rfl:   •  carvedilol (COREG) 6.25 MG tablet, Take 6.25 mg by mouth 2 (Two) Times a Day., Disp: , Rfl:   •  esomeprazole (NexIUM) 40 MG capsule, Take 40 mg by mouth Every Morning Before Breakfast. TAKE 1 CAPSULE BY MOUTH EVERY MORNING, Disp: , Rfl: 3  •  fenofibrate (TRICOR) 48 MG tablet, Take 48 mg by mouth Daily., Disp: , Rfl:   •  furosemide (LASIX) 40 MG tablet, Take 40 mg by mouth Daily As Needed., Disp: , Rfl:   •  glimepiride (AMARYL) 2 MG tablet, Take 2 mg by mouth Every Morning Before Breakfast., Disp: , Rfl:   •  Insulin Pen Needle (B-D UF III MINI PEN NEEDLES) 31G X 5 MM misc, INJECT 1 EACH INTO THE SKIN DAILY, Disp: , Rfl:   •  Liraglutide 18 MG/3ML solution pen-injector, Inject  under the skin daily., Disp: , Rfl:   •  ONE TOUCH ULTRA TEST test strip, USE TO TEST BLOOD SUGAR FOUR TIMES DAILY (DX: 250.02), Disp: , Rfl: 3  •  ONETOUCH DELICA LANCETS 33G misc, USE TO TEST BLOOD SUGAR FOUR TIMES DAILY (DX:250.02), Disp: , Rfl: 3  •  pramipexole (MIRAPEX) 0.5 MG tablet, Take 0.5 mg by mouth Every Night., Disp: , Rfl:   •  sacubitril-valsartan (ENTRESTO) 49-51 MG tablet, Take 1 tablet by mouth 2 (Two) Times a Day., Disp: , Rfl:   •  vitamin D (ERGOCALCIFEROL) 38073 UNITS capsule capsule, Take 50,000 Units by mouth 1 (One) Time Per Week., Disp: , Rfl:     Objective:  /72  Pulse 72  Wt 182 lb (82.6 kg)  BMI 27.67 kg/m2     Physical Exam  Physical Exam:    General: No acute distress.    Skin: Warm and dry, no diaphoresis noted   HEENT: No ptosis; external ear and nose normal; oral mucosa moist   Neck: Supple; no carotid bruits; no JVD, Trachea mid line   Heart: S1S2 regular rate and rhythm; soft systolic murmurs; no gallop or rub appreciated, apex not displaced   Chest: Respirations regular, unlabored at  rest, bilateral breath sounds have good air entry; no  wheezes auscultated.     Abdomen: Soft, non-tender, non-distended, positive bowel sounds  No hepatosplenomegaly   Extremities: Bilateral lower extremities have no pre-tibial pitting edema; Radials are palpable   Neurological: Alert and oriented x 3; no new motor deficits,           ECG 12 Lead  Date/Time: 6/6/2017 4:06 PM  Performed by: SUZI QUIROGA  Authorized by: SUZI QUIROGA   Rhythm: sinus rhythm  Clinical impression: abnormal ECG  Comments: Sinus with V pacing and ventricular couplets           Comparison to previous ECG:  Similar to previous ecg     Assessment:  Problem List Items Addressed This Visit        Cardiovascular and Mediastinum    Cardiomyopathy - Primary    Benign essential HTN    CAD in native artery    Automatic implantable cardioverter-defibrillator in situ    Overview     Overview:   2015 IMO UPDATE            Respiratory    Breathlessness on exertion          Plan:    Overall clinically she has been stable with no angina. Her ecg has been stable with V pacing. She does have occasional ventricular couplet.  I will continue the current medical regimen and advised her to have lipid panel checked with pmd.  She couldn't tolerate higher dose of coreg    I asked her to double the dose of Entresto and if she toleartes I will give a new prescritption.      I not only counseled the patient today on the risk factor modification of significant factors noted in the assessment and plan, and I also recommended that the patient reduce salt and saturated animal fat intake in diet, about the advantages of plant based diet, as well as to perform scheduled exercise on a regular basis.    06/06/2017  Herbert Quiroga MD, FACC

## 2017-06-14 RX ORDER — SACUBITRIL AND VALSARTAN 49; 51 MG/1; MG/1
TABLET, FILM COATED ORAL
Qty: 180 TABLET | Refills: 1 | Status: SHIPPED | OUTPATIENT
Start: 2017-06-14

## 2017-06-27 ENCOUNTER — TRANSCRIBE ORDERS (OUTPATIENT)
Dept: ADMINISTRATIVE | Facility: HOSPITAL | Age: 72
End: 2017-06-27

## 2017-06-27 ENCOUNTER — LAB (OUTPATIENT)
Dept: LAB | Facility: HOSPITAL | Age: 72
End: 2017-06-27
Attending: INTERNAL MEDICINE

## 2017-06-27 DIAGNOSIS — N18.2 CHRONIC KIDNEY DISEASE, STAGE II (MILD): Primary | ICD-10-CM

## 2017-06-27 DIAGNOSIS — N18.2 CHRONIC KIDNEY DISEASE, STAGE II (MILD): ICD-10-CM

## 2017-06-27 LAB
25(OH)D3 SERPL-MCNC: 47 NG/ML (ref 30–100)
ANION GAP SERPL CALCULATED.3IONS-SCNC: 13.1 MMOL/L
BACTERIA UR QL AUTO: NORMAL /HPF
BILIRUB UR QL STRIP: NEGATIVE
BUN BLD-MCNC: 26 MG/DL (ref 8–23)
BUN/CREAT SERPL: 22.4 (ref 7–25)
CALCIUM SPEC-SCNC: 9.2 MG/DL (ref 8.6–10.5)
CHLORIDE SERPL-SCNC: 105 MMOL/L (ref 98–107)
CLARITY UR: CLEAR
CO2 SERPL-SCNC: 23.9 MMOL/L (ref 22–29)
COLOR UR: YELLOW
CREAT BLD-MCNC: 1.16 MG/DL (ref 0.57–1)
CREAT UR-MCNC: 187.3 MG/DL
DEPRECATED RDW RBC AUTO: 45.9 FL (ref 37–54)
ERYTHROCYTE [DISTWIDTH] IN BLOOD BY AUTOMATED COUNT: 13.4 % (ref 11.7–13)
GFR SERPL CREATININE-BSD FRML MDRD: 46 ML/MIN/1.73
GLUCOSE BLD-MCNC: 147 MG/DL (ref 65–99)
GLUCOSE UR STRIP-MCNC: NEGATIVE MG/DL
HCT VFR BLD AUTO: 43.2 % (ref 35.6–45.5)
HGB BLD-MCNC: 14.1 G/DL (ref 11.9–15.5)
HGB UR QL STRIP.AUTO: NEGATIVE
HYALINE CASTS UR QL AUTO: NORMAL /LPF
KETONES UR QL STRIP: NEGATIVE
LEUKOCYTE ESTERASE UR QL STRIP.AUTO: NEGATIVE
MAGNESIUM SERPL-MCNC: 2.2 MG/DL (ref 1.6–2.4)
MCH RBC QN AUTO: 30.7 PG (ref 26.9–32)
MCHC RBC AUTO-ENTMCNC: 32.6 G/DL (ref 32.4–36.3)
MCV RBC AUTO: 94.1 FL (ref 80.5–98.2)
NITRITE UR QL STRIP: NEGATIVE
PH UR STRIP.AUTO: <=5 [PH] (ref 5–8)
PHOSPHATE SERPL-MCNC: 3 MG/DL (ref 2.5–4.5)
PLATELET # BLD AUTO: 138 10*3/MM3 (ref 140–500)
PMV BLD AUTO: 12.3 FL (ref 6–12)
POTASSIUM BLD-SCNC: 4.9 MMOL/L (ref 3.5–5.2)
PROT UR QL STRIP: NEGATIVE
PROT UR-MCNC: 17 MG/DL
RBC # BLD AUTO: 4.59 10*6/MM3 (ref 3.9–5.2)
RBC # UR: NORMAL /HPF
REF LAB TEST METHOD: NORMAL
SODIUM BLD-SCNC: 142 MMOL/L (ref 136–145)
SP GR UR STRIP: 1.03 (ref 1–1.03)
SQUAMOUS #/AREA URNS HPF: NORMAL /HPF
UROBILINOGEN UR QL STRIP: NORMAL
WBC NRBC COR # BLD: 5.71 10*3/MM3 (ref 4.5–10.7)
WBC UR QL AUTO: NORMAL /HPF

## 2017-06-27 PROCEDURE — 36415 COLL VENOUS BLD VENIPUNCTURE: CPT

## 2017-06-27 PROCEDURE — 84156 ASSAY OF PROTEIN URINE: CPT

## 2017-06-27 PROCEDURE — 82306 VITAMIN D 25 HYDROXY: CPT

## 2017-06-27 PROCEDURE — 84100 ASSAY OF PHOSPHORUS: CPT

## 2017-06-27 PROCEDURE — 83735 ASSAY OF MAGNESIUM: CPT

## 2017-06-27 PROCEDURE — 82570 ASSAY OF URINE CREATININE: CPT

## 2017-06-27 PROCEDURE — 81001 URINALYSIS AUTO W/SCOPE: CPT

## 2017-06-27 PROCEDURE — 80048 BASIC METABOLIC PNL TOTAL CA: CPT

## 2017-06-27 PROCEDURE — 85027 COMPLETE CBC AUTOMATED: CPT

## 2017-07-08 ENCOUNTER — HOSPITAL ENCOUNTER (EMERGENCY)
Facility: HOSPITAL | Age: 72
Discharge: HOME | End: 2017-07-09
Attending: EMERGENCY MEDICINE | Admitting: EMERGENCY MEDICINE

## 2017-07-08 ENCOUNTER — APPOINTMENT (OUTPATIENT)
Dept: GENERAL RADIOLOGY | Facility: HOSPITAL | Age: 72
End: 2017-07-08

## 2017-07-08 ENCOUNTER — APPOINTMENT (OUTPATIENT)
Dept: CT IMAGING | Facility: HOSPITAL | Age: 72
End: 2017-07-08

## 2017-07-08 DIAGNOSIS — S80.01XA CONTUSION OF KNEE, RIGHT: ICD-10-CM

## 2017-07-08 DIAGNOSIS — S01.81XA FACIAL LACERATION, INITIAL ENCOUNTER: ICD-10-CM

## 2017-07-08 DIAGNOSIS — S52.592A OTHER CLOSED FRACTURE OF DISTAL END OF LEFT RADIUS, INITIAL ENCOUNTER: Primary | ICD-10-CM

## 2017-07-08 PROCEDURE — 73110 X-RAY EXAM OF WRIST: CPT

## 2017-07-08 PROCEDURE — 73590 X-RAY EXAM OF LOWER LEG: CPT

## 2017-07-08 PROCEDURE — 73560 X-RAY EXAM OF KNEE 1 OR 2: CPT

## 2017-07-08 PROCEDURE — 99284 EMERGENCY DEPT VISIT MOD MDM: CPT

## 2017-07-08 PROCEDURE — 70450 CT HEAD/BRAIN W/O DYE: CPT

## 2017-07-08 PROCEDURE — 72050 X-RAY EXAM NECK SPINE 4/5VWS: CPT

## 2017-07-09 VITALS
HEART RATE: 65 BPM | SYSTOLIC BLOOD PRESSURE: 145 MMHG | RESPIRATION RATE: 16 BRPM | TEMPERATURE: 98.5 F | WEIGHT: 180 LBS | DIASTOLIC BLOOD PRESSURE: 71 MMHG | OXYGEN SATURATION: 98 % | HEIGHT: 69 IN | BODY MASS INDEX: 26.66 KG/M2

## 2017-07-09 NOTE — ED TRIAGE NOTES
Pt tripped and fell today. Pt has abrasions on face and left wrist deformity. Pt also fell on right knee which was recently replaced.

## 2017-07-09 NOTE — ED PROVIDER NOTES
Pt presents to the ED c/o left wrist pain secondary to a mechanical fall. Pt also sustained multiple abrasions to her face, but she denies LOC . On exam, there are multiple abrasions to the face with some tissue loss over the bridge of the nose. Left wrist- swelling and tenderness. Normal sensation and ROM distally. XR left wrist shows a distal radius fx.     Attestation:  I supervised care provided by the midlevel provider.    We have discussed this patient's history, physical exam, and treatment plan.    I have reviewed the note and personally saw and examined the patient and agree with the plan of care.  I agree with the midlevel provider's findings and plan. I reviewed the midlevel providers note.     Documentation assistance provided by marshall Navarro for Dr Anderson. Information recorded by the scribcecil was done at my direction and has been verified and validated by me.     Sue Navarro  07/08/17 7436       Tobias Anderson MD  07/08/17 5827

## 2017-07-09 NOTE — DISCHARGE INSTRUCTIONS
PLEASE READ AND REVIEW ALL DISCHARGE INSTRUCTIONS.     Please follow up with your primary care physician for any further evaluation/treatment and further management of your blood pressure.     Recheck in emergency department for any worsening and/or concerning symptoms.     Take all prescribed medicine as written and continue chronic medication.    Follow up with Dr. Stevens, orthopedics, calling next week regarding your wrist fracture.      Do not use neosporin or vasoline on dermabond, as it will degrade the product.

## 2017-07-09 NOTE — ED PROVIDER NOTES
"EMERGENCY DEPARTMENT ENCOUNTER    CHIEF COMPLAINT  Chief Complaint: Fall  History given by: Pt  History limited by: Nothing  Room Number: 07/07  PMD: Greta Humphrey MD      HPI:  Pt is a 71 y.o. female who presents with tripped and fell onset an hour ago. Pt does not recall her last tetanus shot. She denies syncope secondary to her fall. Pt also complains of R knee and R wrist pain.    Pt has a hx of R total knee replacement and a hx of insulin dependent diabetes. She does not take any anticoagulants.    Duration: One hour  Timing: sudden  Location: n/a  Radiation: none  Quality: \"fall\"  Intensity/Severity: moderate  Progression: unchanged  Associated Symptoms: knee, wrist, and hip pain  Aggravating Factors: movement  Alleviating Factors: none  Previous Episodes: none  Treatment before arrival: Pt received no treatment PTA.    MEDICAL RECORD REVIEW    Pt has a hx of R total knee replacement and a hx of insulin dependent diabetes. She does not take any anticoagulants.    PAST MEDICAL HISTORY  Active Ambulatory Problems     Diagnosis Date Noted   • GERD (gastroesophageal reflux disease) 03/08/2016   • History of colon polyps 03/08/2016   • Non-alcoholic fatty liver disease 03/08/2016   • Impaired renal function 05/13/2016   • Breathlessness on exertion 05/13/2016   • Cardiomyopathy 05/13/2016   • Benign essential HTN 05/13/2016   • CAD in native artery 05/13/2016   • Primary osteoarthritis of both knees 07/25/2016   • MGUS (monoclonal gammopathy of unknown significance) 08/22/2016   • Anemia of chronic renal failure, stage 3 (moderate) 08/22/2016   • Chronic pain of both knees 10/27/2016   • Arthritis of both knees 10/27/2016   • Automatic implantable cardioverter-defibrillator in situ 05/21/2013   • Heart failure 07/01/2014   • Hyperlipidemia 04/27/2015   • Hypertension 04/27/2015   • Hypercalcemia 07/27/2015   • Generalized ischemic myocardial dysfunction 05/21/2013   • Primary osteoarthritis of one knee 02/09/2017 "   • Arthritis of right knee 02/14/2017   • Status post total right knee replacement 02/27/2017   • History of total knee arthroplasty 05/30/2017   • Arthritis of left knee 05/30/2017     Resolved Ambulatory Problems     Diagnosis Date Noted   • No Resolved Ambulatory Problems     Past Medical History:   Diagnosis Date   • Anemia    • Arthritis    • Cataract    • CHF (congestive heart failure)    • Chronic kidney disease    • Coronary artery disease    • Diabetes mellitus    • Hypertension    • Implantable cardioverter-defibrillator discharge    • Osteopenia    • Shortness of breath        PAST SURGICAL HISTORY  Past Surgical History:   Procedure Laterality Date   • CARDIAC DEFIBRILLATOR PLACEMENT     • CARDIAC DEFIBRILLATOR PLACEMENT     • CORONARY ANGIOPLASTY WITH STENT PLACEMENT  2010   • ENDOSCOPY AND COLONOSCOPY N/A 3/8/2016    Procedure: ESOPHAGOGASTRODUODENOSCOPY AND COLONOSCOPY;  Surgeon: Dwight Monae MD;  Location: [facility] ENDOSCOPY;  Service:    • EYE SURGERY     • JOINT REPLACEMENT     • KNEE ARTHROSCOPY Bilateral 2009 2007   • TOTAL KNEE ARTHROPLASTY Right 2/14/2017    Procedure: TOTAL KNEE ARTHROPLASTY WITH MARGE NAVIGATION;  Surgeon: Andrew Monae MD;  Location: [facility] MAIN OR;  Service:        FAMILY HISTORY  Family History   Problem Relation Age of Onset   • Diabetes Mother    • Heart disease Mother    • Heart disease Father    • Cancer Father 85     Bladder   • Cancer Brother 58     Bladder   • Heart disease Maternal Grandmother    • Heart disease Maternal Grandfather        SOCIAL HISTORY  Social History     Social History   • Marital status:      Spouse name: N/A   • Number of children: N/A   • Years of education: College     Occupational History   • Teacher Retired     Social History Main Topics   • Smoking status: Never Smoker   • Smokeless tobacco: Never Used   • Alcohol use No   • Drug use: No   • Sexual activity: Defer     Other Topics Concern   • Not on file     Social  History Narrative   • No narrative on file       ALLERGIES  Amoxicillin    REVIEW OF SYSTEMS  Review of Systems   Constitutional: Negative for fever.        Pt presents with a fall.   HENT: Negative for sore throat.    Eyes: Negative.    Respiratory: Negative for cough and shortness of breath.    Cardiovascular: Negative for chest pain.   Gastrointestinal: Negative for abdominal pain, diarrhea and vomiting.   Genitourinary: Negative for dysuria.   Musculoskeletal: Negative for neck pain.        Pt complains of R knee and R wrist pain.   Skin: Negative for rash.   Allergic/Immunologic: Negative.    Neurological: Negative for weakness, numbness and headaches.   Hematological: Negative.    Psychiatric/Behavioral: Negative.    All other systems reviewed and are negative.      PHYSICAL EXAM  ED Triage Vitals   Temp Heart Rate Resp BP SpO2   07/08/17 2018 07/08/17 2018 07/08/17 2018 07/08/17 2018 07/08/17 2018   97.3 °F (36.3 °C) 82 16 135/74 97 %      Temp src Heart Rate Source Patient Position BP Location FiO2 (%)   -- 07/08/17 2018 -- -- --    Monitor          Physical Exam   Constitutional: She is oriented to person, place, and time and well-developed, well-nourished, and in no distress. No distress.   HENT:   Head: Normocephalic and atraumatic.   Mouth/Throat: Oropharynx is clear and moist.   Eyes: EOM are normal.   Neck: Normal range of motion. Neck supple.   Cardiovascular: Normal rate and regular rhythm.    Pulmonary/Chest: Effort normal and breath sounds normal. No respiratory distress. She has no wheezes. She exhibits no tenderness.   Abdominal: Soft. She exhibits no distension. There is no tenderness. There is no rebound.   Musculoskeletal: Normal range of motion. She exhibits no edema.   Pt has an early bruise at R inferior knee. Pt has a healed incision over her R patella. Pt has tenderness at lateral joint line and medial joint line. She has a normal proximal and distal exam or R lower extremity. There is  bruising at L knee and proximal fibula. Pt has tenderness at proximal L tibia. There is gross deformity at base of l thumb and distal radius. There is a good pulse.   Lymphadenopathy:     She has no cervical adenopathy.   Neurological: She is alert and oriented to person, place, and time.   Skin: Skin is warm and dry. Abrasion (multiple on forehead including a .4cm and a .5cm laceration) noted. No rash noted. No pallor.   Psychiatric: Mood, memory, affect and judgment normal.   Nursing note and vitals reviewed.      LAB RESULTS  No results found for this or any previous visit (from the past 24 hour(s)).    I ordered the above labs and reviewed the results    RADIOLOGY  XR Wrist 3+ View Left   Final Result   1. Distal radius fracture as above.       This report was finalized on 7/8/2017 10:05 PM by Tc Sen MD.          XR Knee 1 or 2 View Right   Final Result   1. No acute osseous abnormality.       This report was finalized on 7/8/2017 10:04 PM by Tc Sen MD.          XR Tibia Fibula 2 View Left   Final Result   1. No acute osseous abnormality.       This report was finalized on 7/8/2017 10:03 PM by Tc Sen MD.          XR Spine Cervical Complete 4 or 5 View   Final Result   Mild degenerative and arthritic changes, no fracture or   malalignment       This report was finalized on 7/8/2017 10:02 PM by Tc Sen MD.          CT Head Without Contrast   Final Result   1. No acute intracranial abnormality.                           This study was performed with techniques to keep radiation doses as low   as reasonably achievable (ALARA). Individualized dose reduction   techniques using automated exposure control or adjustment of mA and/or   kV according to the patient size were employed.        This report was finalized on 7/8/2017 10:01 PM by Tc Sen MD.              I ordered the above noted radiological studies and reviewed the images on the PACS system.      PROCEDURES    Laceration  Repair  Date/Time: 7/8/2017 10:58 PM  Performed by: JUWAN CHOI  Authorized by: MARIA LUISA CRUZ   Consent: Verbal consent obtained.  Risks and benefits: risks, benefits and alternatives were discussed  Consent given by: patient  Patient understanding: patient states understanding of the procedure being performed  Patient consent: the patient's understanding of the procedure matches consent given  Patient identity confirmed: verbally with patient  Body area: head/neck  Location details: forehead  Laceration length: 0.5 cm  Foreign bodies: no foreign bodies  Tendon involvement: none  Nerve involvement: none  Vascular damage: no  Sedation:  Patient sedated: no    Preparation: Patient was prepped and draped in the usual sterile fashion.  Irrigation solution: saline  Irrigation method: jet lavage  Amount of cleaning: standard  Debridement: none  Degree of undermining: none  Skin closure: glue  Patient tolerance: Patient tolerated the procedure well with no immediate complications    Splint Application  Date/Time: 7/8/2017 11:01 PM  Performed by: JUWAN CHOI  Authorized by: MARIA LUISA CRUZ   Consent: Verbal consent obtained.  Risks and benefits: risks, benefits and alternatives were discussed  Consent given by: patient  Patient understanding: patient states understanding of the procedure being performed  Patient consent: the patient's understanding of the procedure matches consent given  Patient identity confirmed: verbally with patient  Location details: left arm  Splint type: volar short arm  Supplies used: Ortho-Glass  Post-procedure: The splinted body part was neurovascularly unchanged following the procedure.  Patient tolerance: Patient tolerated the procedure well with no immediate complications          COURSE & MEDICAL DECISION MAKING  Pertinent Imaging studies that were ordered and reviewed are noted above.  Results were reviewed/discussed with the patient and they were also made aware of online  "assess.  Pt also made aware that some labs, such as cultures, will not be resulted during ER visit and follow up with PMD is necessary.       PROGRESS AND CONSULTS    Progress Notes:    2100 Reviewed pt's history and workup with Dr. Anderson.  After a bedside evaluation; Dr. Anderson agrees with the plan of care    2250 The patient's history, physical exam, and image findings were discussed with the physician, who also performed a face to face history and physical exam.  I discussed all results and noted any abnormalities with patient.  Discussed absoute need to recheck abnormalities with their family physician.  I answered any of the patient's questions.  Discussed plan for discharge, as there is no emergent indication for admission.  Pt is agreeable and understands need for follow up and repeat testing.  Pt is aware that discharge does not mean that nothing is wrong but it indicates no emergency is present and they must continue care with their family physician.  Pt is discharged with instructions to follow up with primary care doctor to have their blood pressure rechecked.     2300 Recheck Pt's splint has hardened and is properly in place.     2307 Recheck Offered pt pain medication and she declined because she states that she has percocet medication at home from her knee surgery.      MEDICATIONS GIVEN IN ER  Medications   Tdap (BOOSTRIX) injection 0.5 mL (0.5 mL Intramuscular Not Given 7/8/17 2202)       /71  Pulse 65  Temp 98.3 °F (36.8 °C) (Temporal Artery )   Resp 16  Ht 68.5\" (174 cm)  Wt 180 lb (81.6 kg)  SpO2 98%  BMI 26.97 kg/m2      DIAGNOSIS  Final diagnoses:   Other closed fracture of distal end of left radius, initial encounter   Facial laceration, initial encounter   Contusion of knee, right       FOLLOW UP   Greta Humphrey MD  [address]  [facility] [address]  [facility] [id number] [phone]          Shukri Stevens MD  [address]  [facility] [address]  [place] " [address]  [phone]            RX     Medication List      Notice     No changes were made to your prescriptions during this visit.        Documentation assistance provided by marshall Avendano.  Information recorded by the marshall was done at my direction and has been verified and validated by me.    I personally scribed for Stacey Sanchez PA-C on 7/8/2017 at 11:08 PM.  Electronically signed by Aaron Avendano on 7/8/2017 at time 11:08 PM       Aaron Avendano  07/08/17 2044         Aaron Avendano  07/08/17 2306       Aaron Avendano  07/08/17 2308       Stacey Sanchez PA-C  07/08/17 2309       Stacey Sanchez PA-C  08/28/17 1403

## 2017-07-10 ENCOUNTER — TELEPHONE (OUTPATIENT)
Dept: ORTHOPEDIC SURGERY | Facility: CLINIC | Age: 72
End: 2017-07-10

## 2017-07-12 ENCOUNTER — OFFICE VISIT (OUTPATIENT)
Dept: ORTHOPEDIC SURGERY | Facility: CLINIC | Age: 72
End: 2017-07-12

## 2017-07-12 VITALS — WEIGHT: 186 LBS | HEIGHT: 69 IN | TEMPERATURE: 99.1 F | BODY MASS INDEX: 27.55 KG/M2

## 2017-07-12 DIAGNOSIS — S69.92XA LEFT WRIST INJURY, INITIAL ENCOUNTER: Primary | ICD-10-CM

## 2017-07-12 PROCEDURE — 25600 CLTX DST RDL FX/EPHYS SEP WO: CPT | Performed by: ORTHOPAEDIC SURGERY

## 2017-07-12 PROCEDURE — 73110 X-RAY EXAM OF WRIST: CPT | Performed by: ORTHOPAEDIC SURGERY

## 2017-07-12 PROCEDURE — 99214 OFFICE O/P EST MOD 30 MIN: CPT | Performed by: ORTHOPAEDIC SURGERY

## 2017-07-12 NOTE — PROGRESS NOTES
History & Physical       Patient: Carolyn Nicolas    YOB: 1945    Medical Record Number: [id number]    Attending Physician: No att. providers found    Chief Complaints: Left wrist injury    History of Present Illness: 71 y.o. female presents for evaluation of her left wrist.  She fell on Saturday, landing awkwardly on her outstretched left upper extremity.  She describes her pain as moderate, intermittent, and aching.  She was seen in the emergency room and placed in a splint.  The splint has helped.  Denies any other injuries or complaints.  She is right-hand dominant.    Allergies:   Allergies   Allergen Reactions   • Amoxicillin Nausea And Vomiting       Home Medications:      Current Outpatient Prescriptions:   •  atorvastatin (LIPITOR) 20 MG tablet, Take 20 mg by mouth Every Night., Disp: , Rfl:   •  B-D UF III MINI PEN NEEDLES 31G X 5 MM misc, INJECT 1 EACH INTO THE SKIN DAILY, Disp: , Rfl: 6  •  BD PEN NEEDLE JACOB U/F 32G X 4 MM misc, , Disp: , Rfl:   •  carvedilol (COREG) 6.25 MG tablet, Take 6.25 mg by mouth 2 (Two) Times a Day., Disp: , Rfl:   •  ENTRESTO 49-51 MG tablet, TAKE 1 TABLET TWICE A DAY, Disp: 180 tablet, Rfl: 1  •  esomeprazole (NexIUM) 40 MG capsule, Take 40 mg by mouth Every Morning Before Breakfast. TAKE 1 CAPSULE BY MOUTH EVERY MORNING, Disp: , Rfl: 3  •  fenofibrate (TRICOR) 48 MG tablet, Take 48 mg by mouth Daily., Disp: , Rfl:   •  furosemide (LASIX) 40 MG tablet, Take 40 mg by mouth Daily As Needed., Disp: , Rfl:   •  glimepiride (AMARYL) 2 MG tablet, Take 2 mg by mouth Every Morning Before Breakfast., Disp: , Rfl:   •  Insulin Pen Needle (B-D UF III MINI PEN NEEDLES) 31G X 5 MM misc, INJECT 1 EACH INTO THE SKIN DAILY, Disp: , Rfl:   •  Liraglutide 18 MG/3ML solution pen-injector, Inject  under the skin daily., Disp: , Rfl:   •  ONE TOUCH ULTRA TEST test strip, USE TO TEST BLOOD SUGAR FOUR TIMES DAILY (DX: 250.02), Disp: , Rfl: 3  •  ONETOUCH DELICA LANCETS 33G  misc, USE TO TEST BLOOD SUGAR FOUR TIMES DAILY (DX:250.02), Disp: , Rfl: 3  •  pramipexole (MIRAPEX) 0.5 MG tablet, Take 0.5 mg by mouth Every Night., Disp: , Rfl:   •  vitamin D (ERGOCALCIFEROL) 72177 UNITS capsule capsule, Take 50,000 Units by mouth 1 (One) Time Per Week., Disp: , Rfl:     Past Medical History:   Diagnosis Date   • Anemia    • Arthritis    • Cataract    • CHF (congestive heart failure)    • Chronic kidney disease    • Coronary artery disease    • Diabetes mellitus    • Hypertension    • Implantable cardioverter-defibrillator discharge    • Osteopenia    • Shortness of breath           Past Surgical History:   Procedure Laterality Date   • CARDIAC DEFIBRILLATOR PLACEMENT     • CARDIAC DEFIBRILLATOR PLACEMENT     • CORONARY ANGIOPLASTY WITH STENT PLACEMENT  2010   • ENDOSCOPY AND COLONOSCOPY N/A 3/8/2016    Procedure: ESOPHAGOGASTRODUODENOSCOPY AND COLONOSCOPY;  Surgeon: Dwight Monae MD;  Location: [facility] ENDOSCOPY;  Service:    • EYE SURGERY     • JOINT REPLACEMENT     • KNEE ARTHROSCOPY Bilateral 2009 2007   • TOTAL KNEE ARTHROPLASTY Right 2/14/2017    Procedure: TOTAL KNEE ARTHROPLASTY WITH MARGE NAVIGATION;  Surgeon: Andrew Monae MD;  Location: [facility] MAIN OR;  Service:           Social History     Occupational History   • Teacher Retired     Social History Main Topics   • Smoking status: Never Smoker   • Smokeless tobacco: Never Used   • Alcohol use No   • Drug use: No   • Sexual activity: Defer      Social History     Social History Narrative          Family History   Problem Relation Age of Onset   • Diabetes Mother    • Heart disease Mother    • Heart disease Father    • Cancer Father 85     Bladder   • Cancer Brother 58     Bladder   • Heart disease Maternal Grandmother    • Heart disease Maternal Grandfather        Review of Systems:      Constitutional: Denies fever, shaking or chills   Eyes: Denies change in visual acuity   HEENT: Denies nasal congestion or sore throat  "  Respiratory: Denies cough or shortness of breath   Cardiovascular: Denies chest pain or edema  Endocrine: Denies tremors, palpitations, intolerance of heat or cold, polyuria, polydipsia.  GI: Denies abdominal pain, nausea, vomiting, bloody stools or diarrhea  : Denies frequency, urgency, incontinence, retention, or nocturia.  Musculoskeletal: Denies numbness tingling or loss of motor function except as above  Integument: Denies rash, lesion or ulceration   Neurologic: Denies headache or focal weakness, deficits  Heme: Denies epistaxis, spontaneous or excessive bleeding, epistaxis, hematuria, melena, fatigue, enlarged or tender lymph nodes.      All other pertinent positives and negatives as noted above in HPI.    Physical Exam: 71 y.o. female    Vitals:    07/12/17 0758   Temp: 99.1 °F (37.3 °C)   TempSrc: Temporal Artery    Weight: 186 lb (84.4 kg)   Height: 68.5\" (174 cm)       General:  Patient is awake and alert.  Appears in no acute distress or discomfort.    Psych:  Affect and demeanor are appropriate.    Eyes:  Conjunctiva and sclera appear grossly normal.  Eyes track well and EOM seem to be intact.    Dentition:  No gross abnormalities noted.    Ears:  No gross abnormalities.  Hearing adequate for the exam.    Cardiovascular:  Regular rate and rhythm.    Lungs:  Good chest expansion.  Breathing unlabored.    Lymph:  No palpable masses or adenopathy in the affected extremity    Left upper extremity:  Splint was in place and removed.  Skin appears benign.  No gross malalignment, lacerations or abrasions.  Focal tenderness noted over the distal radius.  No palpable masses or adenopathy.  Compartments soft.  Painful, limited ROM of the wrist and forearm.  Could not assess instability due to her discomfort.  No tenderness noted over the proximal forearm and elbow.  Good strength in the fingers and thumb with flexion, extension, , and pinch.  Intact sensation.  Brisk cap refill.  Palpable radial pulse.  " Good skin turgor.      Diagnostic Tests:  Lab Results   Component Value Date    GLUCOSE 147 (H) 06/27/2017    CALCIUM 9.2 06/27/2017     06/27/2017    K 4.9 06/27/2017    CO2 23.9 06/27/2017     06/27/2017    BUN 26 (H) 06/27/2017    CREATININE 1.16 (H) 06/27/2017    EGFRIFNONA 46 (L) 06/27/2017    BCR 22.4 06/27/2017    ANIONGAP 13.1 06/27/2017     Lab Results   Component Value Date    WBC 5.71 06/27/2017    HGB 14.1 06/27/2017    HCT 43.2 06/27/2017    MCV 94.1 06/27/2017     (L) 06/27/2017     No results found for: INR, PROTIME    Imaging:  Outside x-rays of the left wrist are reviewed.  These are compared to repeat AP, oblique, and lateral views from the office today.  She has what appears to be an extra-articular distal radius fracture.  Her length and inclination appear well maintained.  The oblique view suggests some dorsal tilt but the lateral view does not seem to confirm that.  She does, however, have significant dorsal comminution.    Assessment:  Left distal radius fracture    Plan: We had a thorough discussion regarding the risks of non-surgical management versus surgery.  With closed treatment, there is the risk of further displacement, malunion, nonunion or persistent pain or loss of motion/function that could require further intervention in the future.  I feel that this injury is amenable to closed treatment.  With her dorsal comminution, she is going to be at high risk for displacement though and we need to follow this closely.  The patient voiced understanding of the risks, benefits, and alternative forms of treatment that were discussed and the patient consents to proceed with closed treatment.  I placed her in a well-padded, well molded cast.  I will see her back next week for repeat x-rays.  We discussed appropriate activity modifications and restrictions.    Date: 7/12/2017    Darnell Sahu MD    CC to Greta Humphrey MD

## 2017-07-19 ENCOUNTER — OFFICE VISIT (OUTPATIENT)
Dept: ORTHOPEDIC SURGERY | Facility: CLINIC | Age: 72
End: 2017-07-19

## 2017-07-19 VITALS — HEIGHT: 69 IN | TEMPERATURE: 97.6 F | BODY MASS INDEX: 27.31 KG/M2 | WEIGHT: 184.4 LBS

## 2017-07-19 DIAGNOSIS — S52.502D CLOSED FRACTURE OF DISTAL END OF LEFT RADIUS WITH ROUTINE HEALING, UNSPECIFIED FRACTURE MORPHOLOGY, SUBSEQUENT ENCOUNTER: Primary | ICD-10-CM

## 2017-07-19 PROCEDURE — 73110 X-RAY EXAM OF WRIST: CPT | Performed by: ORTHOPAEDIC SURGERY

## 2017-07-19 PROCEDURE — 99024 POSTOP FOLLOW-UP VISIT: CPT | Performed by: ORTHOPAEDIC SURGERY

## 2017-07-26 ENCOUNTER — OFFICE VISIT (OUTPATIENT)
Dept: ORTHOPEDIC SURGERY | Facility: CLINIC | Age: 72
End: 2017-07-26

## 2017-07-26 VITALS — WEIGHT: 184 LBS | HEIGHT: 68 IN | TEMPERATURE: 98.6 F | BODY MASS INDEX: 27.89 KG/M2

## 2017-07-26 DIAGNOSIS — S52.502D CLOSED FRACTURE OF DISTAL END OF LEFT RADIUS WITH ROUTINE HEALING, UNSPECIFIED FRACTURE MORPHOLOGY, SUBSEQUENT ENCOUNTER: Primary | ICD-10-CM

## 2017-07-26 PROCEDURE — 73100 X-RAY EXAM OF WRIST: CPT | Performed by: ORTHOPAEDIC SURGERY

## 2017-07-26 PROCEDURE — 99024 POSTOP FOLLOW-UP VISIT: CPT | Performed by: ORTHOPAEDIC SURGERY

## 2017-07-26 NOTE — PROGRESS NOTES
Carolyn Nicolas : 1945 MRN: [id number] DATE: 2017    CC: 2 weeks s/p closed treatment left distal radius fracture    HPI: Pt. returns to clinic today stating pain is improving.  No complaints.  Pain is well-controlled.      Vitals:    17 0752   Temp: 98.6 °F (37 °C)       Exam:  Cast in place.  Skin intact and benign about margins of cast.  Moves fingers well and without discomfort.  Good sensory function distally.  Brisk cap refill      Imaging  2v xrays including AP and lateral views of the wrist are ordered and reviewed by me to evaluate alignment and for comparison purposes.  No new or concerning findings noted. Alignment is appropriate.  Slight dorsal tilt.    Impression:  2 weeks s/p closed treatment left distal radius fracture    Plan:  The alignment appears well maintained.  We talked about the implications of the slight dorsal in terms of her ultimate recovery, function and motion.  She tells me that she understands and does not want surgery.  We will continue cast immobilization. Follow-up in 2 weeks. for repeat x-rays and reevaluation.    Darnell Sahu MD

## 2017-08-03 ENCOUNTER — OFFICE VISIT (OUTPATIENT)
Dept: CARDIOLOGY | Facility: CLINIC | Age: 72
End: 2017-08-03

## 2017-08-03 VITALS
BODY MASS INDEX: 28.59 KG/M2 | SYSTOLIC BLOOD PRESSURE: 118 MMHG | WEIGHT: 188 LBS | HEART RATE: 78 BPM | DIASTOLIC BLOOD PRESSURE: 76 MMHG

## 2017-08-03 DIAGNOSIS — Z95.810 AUTOMATIC IMPLANTABLE CARDIOVERTER-DEFIBRILLATOR IN SITU: ICD-10-CM

## 2017-08-03 DIAGNOSIS — I10 BENIGN ESSENTIAL HTN: ICD-10-CM

## 2017-08-03 DIAGNOSIS — I25.10 CAD IN NATIVE ARTERY: ICD-10-CM

## 2017-08-03 DIAGNOSIS — I42.9 CARDIOMYOPATHY (HCC): Primary | ICD-10-CM

## 2017-08-03 DIAGNOSIS — I25.5 GENERALIZED ISCHEMIC MYOCARDIAL DYSFUNCTION: ICD-10-CM

## 2017-08-03 PROCEDURE — 99214 OFFICE O/P EST MOD 30 MIN: CPT | Performed by: INTERNAL MEDICINE

## 2017-08-03 PROCEDURE — 93000 ELECTROCARDIOGRAM COMPLETE: CPT | Performed by: INTERNAL MEDICINE

## 2017-08-03 NOTE — PROGRESS NOTES
"Procedure   [facility]  Cardiology Progress Note    Patient Identification:  Name:Carolyn Nicolas  Age:71 y.o.  Sex: female  :  1945  MRN: [id number]           8/3/2017    Subjective:    Chief Complaint   Patient presents with   • Coronary Artery Disease   Cardiomyopathy    HPI     Ms Nicolas is  71-year-old white female with history of cardiomyopathy, Bi-V ICD followed by Dr. Ely, hypertension, hyperlipidemia who presents for cardiac follow-up. No chest pain. No orthopnea or PND. No dizziness, palpitations or syncope. No edema.Weight is stable, downward trend. Cholesterol studies are followed by Dr. Daniel. Her blood pressure is been running average 100 systolic with no associated symptoms. No shocks. Overall, her functional capacity is stable. She has no complaints.      Echocardiogram with Doppler shows LVEF 40-45%, was 50-55%. Medical provider, Dr. Ely office, told her take added carvedilol 6.25 mg to her morning regimen. She took medicine for about for 5 days, not able to tolerate because made her feel \"loopy \"she stopped taking the added dose, she better now. She will he takes Lasix if needed. Last dose about one week ago after she cut back from vacation. Prior, has been a long time taking Lasix dose because not needed.        She did have coronary stenting as well, has class 2 dyspnea on exertion. She had no further episodes of chest discomfort . She had a  Recent fall due to slipping but no syncope.     She could not tolerate higher dose statin      Review of Systems   Constitution: Positive for malaise/fatigue. Negative for chills and fever.   HENT: Negative for headaches.    Cardiovascular: Positive for leg swelling. Negative for chest pain, irregular heartbeat and palpitations.   Respiratory: Positive for snoring. Negative for shortness of breath.    Musculoskeletal: Positive for arthritis and joint pain.   Gastrointestinal: Negative for abdominal pain, nausea and " vomiting.   Neurological: Negative for dizziness, light-headedness and numbness.       The following portions of the patient's history were reviewed and updated as appropriate: allergies, current medications, past family history, past medical history, past social history,and problem list.    Past Medical History:   Diagnosis Date   • Anemia    • Arthritis    • Cataract    • CHF (congestive heart failure)    • Chronic kidney disease    • Coronary artery disease    • Diabetes mellitus    • Hypertension    • Implantable cardioverter-defibrillator discharge    • Osteopenia    • Shortness of breath        Past Surgical History:   Procedure Laterality Date   • CARDIAC DEFIBRILLATOR PLACEMENT     • CARDIAC DEFIBRILLATOR PLACEMENT     • CORONARY ANGIOPLASTY WITH STENT PLACEMENT  2010   • ENDOSCOPY AND COLONOSCOPY N/A 3/8/2016    Procedure: ESOPHAGOGASTRODUODENOSCOPY AND COLONOSCOPY;  Surgeon: Dwight Monae MD;  Location: [facility] ENDOSCOPY;  Service:    • EYE SURGERY     • JOINT REPLACEMENT     • KNEE ARTHROSCOPY Bilateral 2009 2007   • TOTAL KNEE ARTHROPLASTY Right 2/14/2017    Procedure: TOTAL KNEE ARTHROPLASTY WITH MARGE NAVIGATION;  Surgeon: Andrew Monae MD;  Location: [facility] MAIN OR;  Service:        Social History     Social History   • Marital status:      Spouse name: N/A   • Number of children: N/A   • Years of education: College     Occupational History   • Teacher Retired     Social History Main Topics   • Smoking status: Never Smoker   • Smokeless tobacco: Never Used   • Alcohol use No   • Drug use: No   • Sexual activity: Defer     Other Topics Concern   • Not on file     Social History Narrative       Family History   Problem Relation Age of Onset   • Diabetes Mother    • Heart disease Mother    • Heart disease Father    • Cancer Father 85     Bladder   • Cancer Brother 58     Bladder   • Heart disease Maternal Grandmother    • Heart disease Maternal Grandfather        Scheduled  Meds:    Current Outpatient Prescriptions:   •  atorvastatin (LIPITOR) 20 MG tablet, Take 20 mg by mouth Every Night., Disp: , Rfl:   •  B-D UF III MINI PEN NEEDLES 31G X 5 MM misc, INJECT 1 EACH INTO THE SKIN DAILY, Disp: , Rfl: 6  •  BD PEN NEEDLE JACOB U/F 32G X 4 MM misc, , Disp: , Rfl:   •  Calcium Carbonate-Vit D-Min (CALCIUM 1200 PO), Take  by mouth., Disp: , Rfl:   •  carvedilol (COREG) 6.25 MG tablet, Take 6.25 mg by mouth 2 (Two) Times a Day., Disp: , Rfl:   •  ENTRESTO 49-51 MG tablet, TAKE 1 TABLET TWICE A DAY, Disp: 180 tablet, Rfl: 1  •  esomeprazole (NexIUM) 40 MG capsule, Take 40 mg by mouth Every Morning Before Breakfast. TAKE 1 CAPSULE BY MOUTH EVERY MORNING, Disp: , Rfl: 3  •  fenofibrate (TRICOR) 48 MG tablet, Take 48 mg by mouth Daily., Disp: , Rfl:   •  furosemide (LASIX) 40 MG tablet, Take 40 mg by mouth Daily As Needed., Disp: , Rfl:   •  glimepiride (AMARYL) 2 MG tablet, Take 2 mg by mouth Every Morning Before Breakfast., Disp: , Rfl:   •  Insulin Pen Needle (B-D UF III MINI PEN NEEDLES) 31G X 5 MM misc, INJECT 1 EACH INTO THE SKIN DAILY, Disp: , Rfl:   •  Liraglutide 18 MG/3ML solution pen-injector, Inject  under the skin daily., Disp: , Rfl:   •  ONE TOUCH ULTRA TEST test strip, USE TO TEST BLOOD SUGAR FOUR TIMES DAILY (DX: 250.02), Disp: , Rfl: 3  •  ONETOUCH DELICA LANCETS 33G misc, USE TO TEST BLOOD SUGAR FOUR TIMES DAILY (DX:250.02), Disp: , Rfl: 3  •  pramipexole (MIRAPEX) 0.5 MG tablet, Take 0.5 mg by mouth Every Night., Disp: , Rfl:   •  vitamin D (ERGOCALCIFEROL) 97613 UNITS capsule capsule, Take 50,000 Units by mouth 1 (One) Time Per Week., Disp: , Rfl:     Objective:  /76  Pulse 78  Wt 188 lb (85.3 kg)  BMI 28.59 kg/m2     Physical Exam  Physical Exam:    General: No acute distress.    Skin: Warm and dry, no diaphoresis noted   HEENT: No ptosis; external ear and nose normal; oral mucosa moist   Neck: Supple; no carotid bruits; no JVD, Trachea mid line   Heart: S1S2  regular rate and rhythm; soft systolic murmurs; no gallop or rub appreciated, apex not displaced   Chest: Respirations regular, unlabored at rest, bilateral breath sounds have good air entry; no  wheezes auscultated.     Abdomen: Soft, non-tender, non-distended, positive bowel sounds  No hepatosplenomegaly   Extremities: Bilateral lower extremities have no pre-tibial pitting edema; Radials are palpable   Neurological: Alert and oriented x 3; no new motor deficits,           ECG 12 Lead  Date/Time: 8/3/2017 12:21 PM  Performed by: SUZI QUIROGA  Authorized by: SUZI QUIROGA   Rhythm: sinus rhythm  Clinical impression: abnormal ECG  Comments: BiV pacing           Comparison to previous ECG:  Similar to previous ecg     Assessment:  Problem List Items Addressed This Visit        Cardiovascular and Mediastinum    Cardiomyopathy - Primary    Benign essential HTN    CAD in native artery    Automatic implantable cardioverter-defibrillator in situ    Overview     Overview:   2015 IMO UPDATE         Generalized ischemic myocardial dysfunction          Plan:    Overall clinically stable with no angina. Her eCG is stable. She could not tolerate higher dose coreg or entresto. Continue the current medical regimen. She see Dr. Ely for her ICD checks and since I am leaving [facility] I asked her to follow up with Dr Ibarra at [facility] office.    To have LDL checked with pmd.      I not only counseled the patient today on the risk factor modification of significant factors noted in the assessment and plan, and I also recommended that the patient reduce salt and saturated animal fat intake in diet, about the advantages of plant based diet, as well as to perform scheduled exercise on a regular basis.    08/03/2017  Herbert Quiroga MD, FACC

## 2017-08-14 ENCOUNTER — OFFICE VISIT (OUTPATIENT)
Dept: ORTHOPEDIC SURGERY | Facility: CLINIC | Age: 72
End: 2017-08-14

## 2017-08-14 VITALS — TEMPERATURE: 98.1 F | WEIGHT: 189.8 LBS | BODY MASS INDEX: 28.76 KG/M2 | HEIGHT: 68 IN

## 2017-08-14 DIAGNOSIS — S52.502D CLOSED FRACTURE OF DISTAL END OF LEFT RADIUS WITH ROUTINE HEALING, UNSPECIFIED FRACTURE MORPHOLOGY, SUBSEQUENT ENCOUNTER: Primary | ICD-10-CM

## 2017-08-14 PROCEDURE — 73110 X-RAY EXAM OF WRIST: CPT | Performed by: ORTHOPAEDIC SURGERY

## 2017-08-14 PROCEDURE — 99024 POSTOP FOLLOW-UP VISIT: CPT | Performed by: ORTHOPAEDIC SURGERY

## 2017-08-14 NOTE — PROGRESS NOTES
Ms. Nicolas is now roughly 5 weeks out from injury.  She tells me the wrist is feeling much better.  She very much wants to get out of the cast today.    The cast is well fitting.  This was removed.  No skin breakdown.  Tenderness about the wrist is very mild.  Certainly nothing exquisite.  Wrist motion is limited, as expected.    AP, oblique, and lateral views of the left wrist are ordered and reviewed.  These are compared to previous x-rays.  There is dorsal tilt measuring right at 10°.  Alignment is otherwise similar to previous x-rays.  There has been interval callous formation.    Assessment: 5 weeks status post closed treatment of left distal radius fracture    Plan: I converted her to a removable brace today.  I'm going to let her start working on progressive range of motion.  We talked about appropriate activity modifications and restrictions.  I will see her back in 1 month.

## 2017-08-25 ENCOUNTER — LAB (OUTPATIENT)
Dept: LAB | Facility: HOSPITAL | Age: 72
End: 2017-08-25

## 2017-08-25 DIAGNOSIS — D47.2 MGUS (MONOCLONAL GAMMOPATHY OF UNKNOWN SIGNIFICANCE): Primary | ICD-10-CM

## 2017-08-25 DIAGNOSIS — D63.1 ANEMIA OF CHRONIC RENAL FAILURE, STAGE 3 (MODERATE) (HCC): ICD-10-CM

## 2017-08-25 DIAGNOSIS — N18.30 ANEMIA OF CHRONIC RENAL FAILURE, STAGE 3 (MODERATE) (HCC): ICD-10-CM

## 2017-08-25 LAB
ANION GAP SERPL CALCULATED.3IONS-SCNC: 12.9 MMOL/L
BASOPHILS # BLD AUTO: 0.04 10*3/MM3 (ref 0–0.1)
BASOPHILS NFR BLD AUTO: 0.8 % (ref 0–1.1)
BUN BLD-MCNC: 21 MG/DL (ref 6–20)
BUN/CREAT SERPL: 20.8 (ref 7.3–30)
CALCIUM SPEC-SCNC: 9.5 MG/DL (ref 8.5–10.2)
CHLORIDE SERPL-SCNC: 105 MMOL/L (ref 98–107)
CO2 SERPL-SCNC: 25.1 MMOL/L (ref 22–29)
CREAT BLD-MCNC: 1.01 MG/DL (ref 0.6–1.1)
DEPRECATED RDW RBC AUTO: 47.3 FL (ref 37–49)
EOSINOPHIL # BLD AUTO: 0.1 10*3/MM3 (ref 0–0.36)
EOSINOPHIL NFR BLD AUTO: 1.9 % (ref 1–5)
ERYTHROCYTE [DISTWIDTH] IN BLOOD BY AUTOMATED COUNT: 13.2 % (ref 11.7–14.5)
FERRITIN SERPL-MCNC: 88 NG/ML
GFR SERPL CREATININE-BSD FRML MDRD: 54 ML/MIN/1.73
GLUCOSE BLD-MCNC: 125 MG/DL (ref 74–124)
HCT VFR BLD AUTO: 42.2 % (ref 34–45)
HGB BLD-MCNC: 13.8 G/DL (ref 11.5–14.9)
HGB RETIC QN: 37.2 PG (ref 29.8–36.1)
IMM GRANULOCYTES # BLD: 0.01 10*3/MM3 (ref 0–0.03)
IMM GRANULOCYTES NFR BLD: 0.2 % (ref 0–0.5)
IMM RETICS NFR: 13.9 % (ref 3–15.8)
IRON 24H UR-MRATE: 97 MCG/DL (ref 37–145)
IRON SATN MFR SERPL: 26 % (ref 14–48)
LYMPHOCYTES # BLD AUTO: 1.85 10*3/MM3 (ref 1–3.5)
LYMPHOCYTES NFR BLD AUTO: 35.6 % (ref 20–49)
MCH RBC QN AUTO: 31.6 PG (ref 27–33)
MCHC RBC AUTO-ENTMCNC: 32.7 G/DL (ref 32–35)
MCV RBC AUTO: 96.6 FL (ref 83–97)
MONOCYTES # BLD AUTO: 0.26 10*3/MM3 (ref 0.25–0.8)
MONOCYTES NFR BLD AUTO: 5 % (ref 4–12)
NEUTROPHILS # BLD AUTO: 2.94 10*3/MM3 (ref 1.5–7)
NEUTROPHILS NFR BLD AUTO: 56.5 % (ref 39–75)
NRBC BLD MANUAL-RTO: 0 /100 WBC (ref 0–0)
PLATELET # BLD AUTO: 142 10*3/MM3 (ref 150–375)
PMV BLD AUTO: 11.6 FL (ref 8.9–12.1)
POTASSIUM BLD-SCNC: 4.3 MMOL/L (ref 3.5–4.7)
RBC # BLD AUTO: 4.37 10*6/MM3 (ref 3.9–5)
RETICS/RBC NFR AUTO: 2.11 % (ref 0.6–2)
SODIUM BLD-SCNC: 143 MMOL/L (ref 134–145)
TIBC SERPL-MCNC: 371 MCG/DL (ref 249–505)
TRANSFERRIN SERPL-MCNC: 265 MG/DL (ref 200–360)
WBC NRBC COR # BLD: 5.2 10*3/MM3 (ref 4–10)

## 2017-08-25 PROCEDURE — 85046 RETICYTE/HGB CONCENTRATE: CPT | Performed by: INTERNAL MEDICINE

## 2017-08-25 PROCEDURE — 36415 COLL VENOUS BLD VENIPUNCTURE: CPT | Performed by: INTERNAL MEDICINE

## 2017-08-25 PROCEDURE — 82728 ASSAY OF FERRITIN: CPT | Performed by: INTERNAL MEDICINE

## 2017-08-25 PROCEDURE — 84466 ASSAY OF TRANSFERRIN: CPT | Performed by: INTERNAL MEDICINE

## 2017-08-25 PROCEDURE — 83540 ASSAY OF IRON: CPT | Performed by: INTERNAL MEDICINE

## 2017-08-25 PROCEDURE — 80048 BASIC METABOLIC PNL TOTAL CA: CPT | Performed by: INTERNAL MEDICINE

## 2017-08-25 PROCEDURE — 85025 COMPLETE CBC W/AUTO DIFF WBC: CPT | Performed by: INTERNAL MEDICINE

## 2017-08-28 ENCOUNTER — HOSPITAL ENCOUNTER (OUTPATIENT)
Dept: GENERAL RADIOLOGY | Facility: HOSPITAL | Age: 72
Discharge: HOME | End: 2017-08-28
Attending: INTERNAL MEDICINE | Admitting: INTERNAL MEDICINE

## 2017-08-28 DIAGNOSIS — D47.2 MGUS (MONOCLONAL GAMMOPATHY OF UNKNOWN SIGNIFICANCE): ICD-10-CM

## 2017-08-28 DIAGNOSIS — N18.30 ANEMIA OF CHRONIC RENAL FAILURE, STAGE 3 (MODERATE) (HCC): ICD-10-CM

## 2017-08-28 DIAGNOSIS — D63.1 ANEMIA OF CHRONIC RENAL FAILURE, STAGE 3 (MODERATE) (HCC): ICD-10-CM

## 2017-08-28 LAB
ALBUMIN SERPL-MCNC: 3.6 G/DL (ref 2.9–4.4)
ALBUMIN/GLOB SERPL: 1.4 {RATIO} (ref 0.7–1.7)
ALPHA1 GLOB FLD ELPH-MCNC: 0.2 G/DL (ref 0–0.4)
ALPHA2 GLOB SERPL ELPH-MCNC: 0.5 G/DL (ref 0.4–1)
B-GLOBULIN SERPL ELPH-MCNC: 0.9 G/DL (ref 0.7–1.3)
GAMMA GLOB SERPL ELPH-MCNC: 0.9 G/DL (ref 0.4–1.8)
GLOBULIN SER CALC-MCNC: 2.7 G/DL (ref 2.2–3.9)
IGA SERPL-MCNC: 131 MG/DL (ref 87–352)
IGG SERPL-MCNC: 728 MG/DL (ref 700–1600)
IGM SERPL-MCNC: 280 MG/DL (ref 26–217)
INTERPRETATION SERPL IEP-IMP: ABNORMAL
Lab: ABNORMAL
M-SPIKE: ABNORMAL G/DL
PROT SERPL-MCNC: 6.3 G/DL (ref 6–8.5)

## 2017-08-28 PROCEDURE — 77075 RADEX OSSEOUS SURVEY COMPL: CPT

## 2017-08-30 ENCOUNTER — OFFICE VISIT (OUTPATIENT)
Dept: ORTHOPEDIC SURGERY | Facility: CLINIC | Age: 72
End: 2017-08-30

## 2017-08-30 VITALS — TEMPERATURE: 97.4 F | WEIGHT: 188 LBS | HEIGHT: 68 IN | BODY MASS INDEX: 28.49 KG/M2

## 2017-08-30 DIAGNOSIS — Z96.651 HISTORY OF TOTAL KNEE ARTHROPLASTY, RIGHT: Primary | ICD-10-CM

## 2017-08-30 PROCEDURE — 73560 X-RAY EXAM OF KNEE 1 OR 2: CPT | Performed by: NURSE PRACTITIONER

## 2017-08-30 PROCEDURE — 99213 OFFICE O/P EST LOW 20 MIN: CPT | Performed by: NURSE PRACTITIONER

## 2017-08-30 RX ORDER — GLIMEPIRIDE 4 MG/1
TABLET ORAL
COMMUNITY
Start: 2017-06-25

## 2017-08-30 NOTE — PROGRESS NOTES
NAME: Carolyn Nicolas  : 1945   MRN: [id number]   DATE: 2017    CC: 5 months Follow up status post total joint replacement, new fall on  and hit knees but no lacerations    SUBJECTIVE:    HPI:  5 months Follow up status post total joint replacement, new fall on 8 and hit knees but no lacerations, with swelling and tenderness and bruising to right knee  HPI    This problem is new to this examiner.     Allergies:   Allergies   Allergen Reactions   • Amoxicillin Nausea And Vomiting       Medications:   Home Medications:  Current Outpatient Prescriptions on File Prior to Visit   Medication Sig   • atorvastatin (LIPITOR) 20 MG tablet Take 20 mg by mouth Every Night.   • B-D UF III MINI PEN NEEDLES 31G X 5 MM misc INJECT 1 EACH INTO THE SKIN DAILY   • BD PEN NEEDLE JACOB U/F 32G X 4 MM misc    • Calcium Carbonate-Vit D-Min (CALCIUM 1200 PO) Take  by mouth.   • carvedilol (COREG) 6.25 MG tablet Take 6.25 mg by mouth 2 (Two) Times a Day.   • ENTRESTO 49-51 MG tablet TAKE 1 TABLET TWICE A DAY   • esomeprazole (NexIUM) 40 MG capsule Take 40 mg by mouth Every Morning Before Breakfast. TAKE 1 CAPSULE BY MOUTH EVERY MORNING   • fenofibrate (TRICOR) 48 MG tablet Take 48 mg by mouth Daily.   • furosemide (LASIX) 40 MG tablet Take 40 mg by mouth Daily As Needed.   • Insulin Pen Needle (B-D UF III MINI PEN NEEDLES) 31G X 5 MM misc INJECT 1 EACH INTO THE SKIN DAILY   • Liraglutide 18 MG/3ML solution pen-injector Inject  under the skin daily.   • ONE TOUCH ULTRA TEST test strip USE TO TEST BLOOD SUGAR FOUR TIMES DAILY (DX: 250.02)   • ONETOUCH DELICA LANCETS 33G misc USE TO TEST BLOOD SUGAR FOUR TIMES DAILY (DX:250.02)   • pramipexole (MIRAPEX) 0.5 MG tablet Take 0.5 mg by mouth Every Night.   • vitamin D (ERGOCALCIFEROL) 29744 UNITS capsule capsule Take 50,000 Units by mouth 1 (One) Time Per Week.   • [DISCONTINUED] glimepiride (AMARYL) 2 MG tablet Take 2 mg by mouth Every Morning Before Breakfast.     No current  facility-administered medications on file prior to visit.        Current Medications:  Scheduled Meds:  Continuous Infusions:  No current facility-administered medications for this visit.   PRN Meds:.    I have reviewed the patient's medical history in detail and updated the computerized patient record.  Review and summarization of old records include:    Past Medical History:   Diagnosis Date   • Anemia    • Arthritis    • Cataract    • CHF (congestive heart failure)    • Chronic kidney disease    • Coronary artery disease    • Diabetes mellitus    • Hypertension    • Implantable cardioverter-defibrillator discharge    • Osteopenia    • Shortness of breath         Past Surgical History:   Procedure Laterality Date   • CARDIAC DEFIBRILLATOR PLACEMENT     • CARDIAC DEFIBRILLATOR PLACEMENT     • CORONARY ANGIOPLASTY WITH STENT PLACEMENT  2010   • ENDOSCOPY AND COLONOSCOPY N/A 3/8/2016    Procedure: ESOPHAGOGASTRODUODENOSCOPY AND COLONOSCOPY;  Surgeon: Dwight Monae MD;  Location: [facility] ENDOSCOPY;  Service:    • EYE SURGERY     • JOINT REPLACEMENT     • KNEE ARTHROSCOPY Bilateral 2009 2007   • TOTAL KNEE ARTHROPLASTY Right 2/14/2017    Procedure: TOTAL KNEE ARTHROPLASTY WITH MARGE NAVIGATION;  Surgeon: Andrew Monae MD;  Location: [facility] OR;  Service:         Social History     Occupational History   • Teacher Retired     Social History Main Topics   • Smoking status: Never Smoker   • Smokeless tobacco: Never Used   • Alcohol use No   • Drug use: No   • Sexual activity: Defer    Social History     Social History Narrative        Family History   Problem Relation Age of Onset   • Diabetes Mother    • Heart disease Mother    • Heart disease Father    • Cancer Father 85     Bladder   • Cancer Brother 58     Bladder   • Heart disease Maternal Grandmother    • Heart disease Maternal Grandfather        ROS: 14 point review of systems was performed and was negative except for documented findings in HPI and  today's encounter.     Allergies:   Allergies   Allergen Reactions   • Amoxicillin Nausea And Vomiting     Constitutional:  Denies fever, shaking or chills   Eyes:  Denies change in visual acuity   HENT:  Denies nasal congestion or sore throat   Respiratory:  Denies cough or shortness of breath   Cardiovascular:  Denies chest pain or severe LE edema   GI:  Denies abdominal pain, nausea, vomiting, bloody stools or diarrhea   Musculoskeletal:  Denies numbness tingling or loss of motor function except as outlined above in history of present illness.  : Denies painful urination or hematuria  Integument:  Denies rash, lesion or ulceration   Neurologic:  Denies headache or focal weakness  Endocrine:  Denies lymphadenopathy  Psych:  Denies confusion or change in mental status   Hem:  Denies active bleeding        OBJECTIVE:     Physical Exam:  Vital Signs:  Body mass index is 28.59 kg/(m^2).  Vitals:    08/30/17 1353   Temp: 97.4 °F (36.3 °C)       Constitutional: Awake alert and oriented x3, well developed, well nourished, no acute distress, non-toxic appearance.  HEENT:  Normocephalic, Atraumatic, Bilateral external ears normal, Oropharynx moist, No oral exudates, Nose normal.   Respiratory:  No respiratory distress, No wheezing  CV: No palpitations  Vascular:  Brisk cap refill, Intact distal pulses, No cyanosis, all compartments soft with no signs or symptoms of compartment syndrome or DVT.  Neurologic: Sensation grossly intact to light touch throughout the involved extremity and bilaterally symmetric, deep tendon reflexes are 2+ and bilaterally symmetric, No focal deficits noted.   Neck:  Normal range of motion, No tenderness, Supple, Negative Spurlings.  Integument: Well hydrated, no rash, warm, dry, no lesions or ulceration.   Musculoskeletal:  Affected extremity post op incision is healed appropriately. No sign of infection. Range of motion is progressing as expected but is more stiff and sore since fall -4/110 .  The calf is soft and nontender with a negative Homans sign. Distal pulses intact. Normal amount of swelling present for recovery time.     DIAGNOSTIC STUDIES  Xrays: 2 views of the right knee (AP full length and lateral) were ordered and reviewed for evaluation of past joint replacement. They demonstrate a well positioned, well aligned total joint replacement without complicating factors noted. In comparison with the film from the last office visit, there has been no alignment change.    ASSESSMENT: Status post right total knee replacement with expected healing, new injury with swelling knee contusion    PLAN: 1) Continue home PT exercises as needed. Continue stretching and rom so that new scar tissue does not form following injury.    2) Continue with antibiotic prophylaxis with dental procedures for 2 yrs post total joint replacement.   3) Follow up as ordered.    8/30/2017  Patient was seen by NAOMI Potter in the office today.

## 2017-08-30 NOTE — PATIENT INSTRUCTIONS
[facility]    KNEE HOME EXERCISES      It is important that you maintain and possibly improve your strength and range of motion of your knee.      •  Walk frequently for short intervals  •  Using a cane or walker to allow you to walk safely if needed  •  Avoid sitting for long periods of time to avoid cramping and swelling of your leg   •  Do exercises either on a bed or in a chair.  •  If any of the exercises cause you pain, either eliminate that exercise or decrease          the motion or repetitions      Start exercises with 10 repetitions and increase to 30 repetitions.  Do two sets of each exercise each day    ANKLE PUMPS    1. Move your feet up and down as if you are pumping on a gas pedal       STRAIGHT LEG RAISES    1. Lie flat with your injured leg straight.  Keep the other leg bent.  2. Tighten the injured leg's thigh muscle.  3. Lift leg, with knee locked in the straight position no higher than the other knee for  seconds  4. Lower leg slowly. Rest for 5 seconds      SHORT ARC QUAD    1. Lie with both knees bent over a pillow  2. Straighten both knees  3. Hold 5 seconds  4. Bend knees back to starting position and repeat sequence       QUADRICEPS     1. Lie flat with your injured let straight.  Keep the other leg bent.  2. Tighten the injured leg's thigh muscle by trying to move your kneecap toward the  thigh.  3. Make your leg as stiff as you can.  4. Hold for 5 seconds and relax for 5 seconds        HAMSTRING STRETCH    1. Lie flat with your injured leg straight. Keep the other leg bent  2. Press your heel of your injured leg into the floor for 5 seconds       OR  1. Sit with injured leg out straight.   2. Loop a sheet around the ball of foot and toes.  3. Gently pull on the sheet, keeping the leg straight  4. Hold for 10-30 seconds      KNEE FLEXION-EXTENSION SITTING     1. Sit with injured let bent as shown  2. Straighten leg at the knee  3. Hold 5 seconds  4. Bend knee back  to starting position   5. Rest for 2-5 seconds and repeat sequence       HEEL SLIDES     1. Lie on back with legs straight  2. Slide heels toward buttocks  3. Return to starting position       HEEL SLIDS WITH SHEET    1. Lie on your back with a sheet wrapped around your foot  2. Pull on the sheet to assist you in bending your knee and sliding your heel toward  your buttocks  3. Hold for 5 seconds        KNEE FLEXION STRETCH    1. Sit in a chair  2. Bend bad knee back as far as you can   3. Hold stretch for 5-10 seconds      CHAIR PUSH UPS    1. Sit in a chair with arm rests  2. Place hands on armrests  3. Straighten arms, raising buttocks up off of chair         WALL SLIDES    1. Stand with your back and head against a wall.    2. Keep your feet shoulder width apart and 6-12 inches from the wall  3. Slowly slide down the wall until your knees are slightly bent.    4. Hold for 5 seconds and then slowly slide back up  5. As you get stronger, then you can slowly increase the bend in your knees, but do  not drop your buttocks below the level of your knees       STEP UPS    1. Stand with your foot on your injured leg on a 3-5 inch support (such as a block of  wood)  2. Place other foot on the floor  3. Shift your weight onto the foot on the block and try to straighten the knee and  raising the other foot off of the floor  4. Slowly lower your foot until only the heel touches the floor

## 2017-09-08 ENCOUNTER — OFFICE VISIT (OUTPATIENT)
Dept: ONCOLOGY | Facility: CLINIC | Age: 72
End: 2017-09-08

## 2017-09-08 ENCOUNTER — APPOINTMENT (OUTPATIENT)
Dept: LAB | Facility: HOSPITAL | Age: 72
End: 2017-09-08

## 2017-09-08 VITALS
DIASTOLIC BLOOD PRESSURE: 70 MMHG | BODY MASS INDEX: 27.49 KG/M2 | SYSTOLIC BLOOD PRESSURE: 120 MMHG | RESPIRATION RATE: 16 BRPM | HEIGHT: 70 IN | HEART RATE: 74 BPM | TEMPERATURE: 97.9 F | WEIGHT: 192 LBS

## 2017-09-08 DIAGNOSIS — D47.2 MGUS (MONOCLONAL GAMMOPATHY OF UNKNOWN SIGNIFICANCE): Primary | ICD-10-CM

## 2017-09-08 DIAGNOSIS — N18.30 ANEMIA OF CHRONIC RENAL FAILURE, STAGE 3 (MODERATE) (HCC): ICD-10-CM

## 2017-09-08 DIAGNOSIS — D63.1 ANEMIA OF CHRONIC RENAL FAILURE, STAGE 3 (MODERATE) (HCC): ICD-10-CM

## 2017-09-08 PROCEDURE — G0463 HOSPITAL OUTPT CLINIC VISIT: HCPCS | Performed by: INTERNAL MEDICINE

## 2017-09-08 PROCEDURE — 99215 OFFICE O/P EST HI 40 MIN: CPT | Performed by: INTERNAL MEDICINE

## 2017-09-08 NOTE — PROGRESS NOTES
Subjective .     REASONS FOR FOLLOWUP:  MGUS, intermittent anemia and thrombocytopenia, questionable skull lesion    HISTORY OF PRESENT ILLNESS:  The patient is a 71 y.o. year old female  who is here for follow-up with the above-mentioned history.    Denies fever or chills.  Denies unintentional significant weight loss  Denies drenching night sweats.  Denies recurrent or unusual infections.  Denies pain.      Past Medical History:   Diagnosis Date   • Anemia    • Arthritis    • Cataract    • CHF (congestive heart failure)    • Chronic kidney disease    • Coronary artery disease    • Diabetes mellitus    • Hypertension    • Implantable cardioverter-defibrillator discharge    • Osteopenia    • Shortness of breath      Past Surgical History:   Procedure Laterality Date   • CARDIAC DEFIBRILLATOR PLACEMENT     • CARDIAC DEFIBRILLATOR PLACEMENT     • CORONARY ANGIOPLASTY WITH STENT PLACEMENT  2010   • ENDOSCOPY AND COLONOSCOPY N/A 3/8/2016    Procedure: ESOPHAGOGASTRODUODENOSCOPY AND COLONOSCOPY;  Surgeon: Dwight Monae MD;  Location: [facility] ENDOSCOPY;  Service:    • EYE SURGERY     • JOINT REPLACEMENT     • KNEE ARTHROSCOPY Bilateral 2009 2007   • TOTAL KNEE ARTHROPLASTY Right 2/14/2017    Procedure: TOTAL KNEE ARTHROPLASTY WITH MARGE NAVIGATION;  Surgeon: Andrew Monae MD;  Location: [facility] OR;  Service:        HEMATOLOGIC/ONCOLOGIC HISTORY:  (History from previous dates can be found in the separate document.)    MEDICATIONS    Current Outpatient Prescriptions:   •  atorvastatin (LIPITOR) 20 MG tablet, Take 20 mg by mouth Every Night., Disp: , Rfl:   •  B-D UF III MINI PEN NEEDLES 31G X 5 MM misc, INJECT 1 EACH INTO THE SKIN DAILY, Disp: , Rfl: 6  •  BD PEN NEEDLE JACOB U/F 32G X 4 MM misc, , Disp: , Rfl:   •  Calcium Carbonate-Vit D-Min (CALCIUM 1200 PO), Take  by mouth., Disp: , Rfl:   •  carvedilol (COREG) 6.25 MG tablet, Take 6.25 mg by mouth 2 (Two) Times a Day., Disp: , Rfl:   •  ENTRESTO 49-51 MG  tablet, TAKE 1 TABLET TWICE A DAY, Disp: 180 tablet, Rfl: 1  •  esomeprazole (NexIUM) 40 MG capsule, Take 40 mg by mouth Every Morning Before Breakfast. TAKE 1 CAPSULE BY MOUTH EVERY MORNING, Disp: , Rfl: 3  •  fenofibrate (TRICOR) 48 MG tablet, Take 48 mg by mouth Daily., Disp: , Rfl:   •  furosemide (LASIX) 40 MG tablet, Take 40 mg by mouth Daily As Needed., Disp: , Rfl:   •  glimepiride (AMARYL) 4 MG tablet, , Disp: , Rfl:   •  Insulin Pen Needle (B-D UF III MINI PEN NEEDLES) 31G X 5 MM misc, INJECT 1 EACH INTO THE SKIN DAILY, Disp: , Rfl:   •  Liraglutide 18 MG/3ML solution pen-injector, Inject  under the skin daily., Disp: , Rfl:   •  ONE TOUCH ULTRA TEST test strip, USE TO TEST BLOOD SUGAR FOUR TIMES DAILY (DX: 250.02), Disp: , Rfl: 3  •  ONETOUCH DELICA LANCETS 33G misc, USE TO TEST BLOOD SUGAR FOUR TIMES DAILY (DX:250.02), Disp: , Rfl: 3  •  pramipexole (MIRAPEX) 0.5 MG tablet, Take 0.5 mg by mouth Every Night., Disp: , Rfl:   •  vitamin D (ERGOCALCIFEROL) 91020 UNITS capsule capsule, Take 50,000 Units by mouth 1 (One) Time Per Week., Disp: , Rfl:     ALLERGIES:     Allergies   Allergen Reactions   • Amoxicillin Nausea And Vomiting       SOCIAL HISTORY:       Social History     Social History   • Marital status:      Spouse name: N/A   • Number of children: N/A   • Years of education: College     Occupational History   • Teacher Retired     Social History Main Topics   • Smoking status: Never Smoker   • Smokeless tobacco: Never Used   • Alcohol use No   • Drug use: No   • Sexual activity: Defer     Other Topics Concern   • Not on file     Social History Narrative         FAMILY HISTORY:  Family History   Problem Relation Age of Onset   • Diabetes Mother    • Heart disease Mother    • Heart disease Father    • Cancer Father 85     Bladder   • Cancer Brother 58     Bladder   • Heart disease Maternal Grandmother    • Heart disease Maternal Grandfather        REVIEW OF SYSTEMS:  GENERAL: No change in  "appetite or weight;   No fevers, chills, sweats.    SKIN: No nonhealing lesions.   No rashes.  HEME/LYMPH: No easy bruising, bleeding.   No swollen nodes.   EYES: No vision changes or diplopia.   ENT: No tinnitus, hearing loss, gum bleeding, epistaxis, hoarseness or dysphagia.   RESPIRATORY: No cough, shortness of breath, hemoptysis or wheezing.   CVS: No chest pain, palpitations, orthopnea, dyspnea on exertion or PND.   GI: No melena or hematochezia.   No abdominal pain.  No nausea, vomiting, constipation, diarrhea  : No lower tract obstructive symptoms, dysuria or hematuria.   MUSCULOSKELETAL: see HPI  NEUROLOGICAL: No global weakness, loss of consciousness or seizures.   PSYCHIATRIC: No increased nervousness, mood changes or depression.     Objective    Vitals:    09/08/17 1023   BP: 120/70   Pulse: 74   Resp: 16   Temp: 97.9 °F (36.6 °C)   Weight: 192 lb (87.1 kg)   Height: 69.69\" (177 cm)  Comment: new ht.   PainSc: 0-No pain     Current Status 9/8/2017   ECOG score 0      PHYSICAL EXAM:    GENERAL:  Well-developed, well-nourished in no acute distress.   SKIN:  Warm, dry without rashes, purpura or petechiae.  HEAD:  Normocephalic.  EYES:  Pupils equal, round and reactive to light.  EOMs intact.  Conjunctivae normal.  EARS:  Hearing intact.  NOSE:  Septum midline.  No excoriations or nasal discharge.  MOUTH:  Tongue is well-papillated; no stomatitis or ulcers.  Lips normal.  THROAT:  Oropharynx without lesions or exudates.  NECK:  Supple with good range of motion; no thyromegaly or masses, no JVD.  LYMPHATICS:  No cervical, supraclavicular, axillary or inguinal adenopathy.  CHEST:  Lungs clear to percussion and auscultation. Good airflow.  CARDIAC:  Regular rate and rhythm without murmurs, rubs or gallops. Normal S1,S2.  ABDOMEN:  Soft, nontender with no organomegaly or masses.  EXTREMITIES:  No clubbing, cyanosis or edema.  NEUROLOGICAL:  Cranial Nerves II-XII grossly intact.  No focal neurological " deficits.  PSYCHIATRIC:  Normal affect and mood.      RECENT LABS:        WBC   Date/Time Value Ref Range Status   08/25/2017 09:07 AM 5.20 4.00 - 10.00 10*3/mm3 Final     Hemoglobin   Date/Time Value Ref Range Status   08/25/2017 09:07 AM 13.8 11.5 - 14.9 g/dL Final     Platelets   Date/Time Value Ref Range Status   08/25/2017 09:07  (L) 150 - 375 10*3/mm3 Final       Assessment/Plan     1.  IgM lambda monoclonal gammopathy of unknown significance. CAT scans showed no evidence of an underlying lymphoma on 08/03/2012.   Recent M spike to small to quantitate.    2.  Intermittent anemia and intermittent thrombocytopenia. History of borderline ferritin of 43. ferrous sulfate twice per day since 08/10/2012.  GI evaluation by Dr. Monae in the past. Hemoccult cards negative x 3 on /11/20/2012.   Recent iron studies are normal.       3.  Questionable 7 mm L4 lesion seen on CAT scan on 8/3/12. This was not seen on metastatic bone survey. Plan followup The L4 lesion initially seen on 8/3/12 was unchanged with central fat density and was felt by the radiologist to be benign on followup CAT scan on 1/15/13. Therefore, plan no further followup CAT scans.     4.  Chronic renal insufficiency. She follows with Dr. Teresa Rosales.     5.  Questionable small lytic lesion in the skull, first seen on bone survey on 07/08/2013. CAT scan of the head 07/ 30/2013 and 10/23/2013 show no change and offered no additional information. She did have some venous lakes in her skull. 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   Unchanged on bone survey 04/30/2015 and bone survey 8/28/17.    6.  Possible lytic lesion right acetabulum on frontal view of the pelvis on bone survey 8/28/17.  However, not seen on proximal right femur frontal view.  Radiologist felt this likely represented overlying bowel, but stated a follow-up x-ray of the pelvis could be obtained for confirmation.  Patient declines this but is willing to repeat a bone survey in one  year.  This was a new issue for me to address today.      PLAN:   1.  M.D. visit in one year with serum protein studies and bone survey 2 weeks prior.   2.  If next years bone survey looks okay, we'll likely move to every 2 year bone surveys.  3. (Plan no more CT scans of the head to try and reduce radiation exposure. This was discussed with the patient on the 10/30/2013 visit and she agreed).         Bone survey images personally reviewed by me.

## 2017-09-18 ENCOUNTER — OFFICE VISIT (OUTPATIENT)
Dept: ORTHOPEDIC SURGERY | Facility: CLINIC | Age: 72
End: 2017-09-18

## 2017-09-18 VITALS — WEIGHT: 192.4 LBS | TEMPERATURE: 99 F | HEIGHT: 69 IN | BODY MASS INDEX: 28.5 KG/M2

## 2017-09-18 DIAGNOSIS — S52.502D CLOSED FRACTURE OF DISTAL END OF LEFT RADIUS WITH ROUTINE HEALING, UNSPECIFIED FRACTURE MORPHOLOGY, SUBSEQUENT ENCOUNTER: Primary | ICD-10-CM

## 2017-09-18 PROCEDURE — 73110 X-RAY EXAM OF WRIST: CPT | Performed by: ORTHOPAEDIC SURGERY

## 2017-09-18 PROCEDURE — 99024 POSTOP FOLLOW-UP VISIT: CPT | Performed by: ORTHOPAEDIC SURGERY

## 2017-09-18 NOTE — PROGRESS NOTES
Ms. Nicolas comes in for follow-up of the left wrist.  She tells me it is feeling much better.    The brace was removed.  She has mild to moderate edema over the dorsum.  Mild tenderness to deep palpation only.  The wrist is still stiff.  Dorsiflexion is limited to about 15°, volar flexion to about 20°.    AP, oblique, and lateral views of the left wrist are ordered and reviewed.  These are compared to previous x-rays.  There has been abundant interval callous formation and bony healing.  Alignment is unchanged.    Assessment: 2.5 months status post closed treatment of left distal radius fracture    Plan: I recommend therapy.  She can discontinue use of the brace at this point.  No restrictions are necessary with regards to weightbearing or lifting at this point.  I demonstrated some home exercises for her as well.  She can follow-up with me as needed.

## 2017-09-28 ENCOUNTER — TREATMENT (OUTPATIENT)
Dept: PHYSICAL THERAPY | Facility: CLINIC | Age: 72
End: 2017-09-28

## 2017-09-28 DIAGNOSIS — S52.325D CLOSED NONDISPLACED TRANSVERSE FRACTURE OF SHAFT OF LEFT RADIUS WITH ROUTINE HEALING, SUBSEQUENT ENCOUNTER: Primary | ICD-10-CM

## 2017-09-28 PROCEDURE — 97161 PT EVAL LOW COMPLEX 20 MIN: CPT | Performed by: PHYSICAL THERAPIST

## 2017-09-28 PROCEDURE — 97110 THERAPEUTIC EXERCISES: CPT | Performed by: PHYSICAL THERAPIST

## 2017-09-28 NOTE — PROGRESS NOTES
Physical Therapy Initial Evaluation and Plan of Care    Patient: Carolyn Nicolas   : 1945  Diagnosis/ICD-10 Code:  Closed nondisplaced transverse fracture of shaft of left radius with routine healing, subsequent encounter [S52.325D]  Referring practitioner: Darnell Sahu MD  Past medical Hx reviewed: 2017     Subjective Evaluation    History of Present Illness  Date of onset: 2017  Mechanism of injury: I was my neighbors house taking care of their dog and then suddenly fell forward onto my hand/wrist and impacted my face.  I went to the ER and had several X-rays and no facial fractures only the wrist.  No sx was not indicated but was casted for 4 weeks and now in my 5th week of a hard supportive brace.    I have been discharged from brace wear but I do wear to drive and when I leave home.  I know its still weak.     I can't really lift or hold anything of much weight.  I only feel comfortable lifting about 3 ceramic soup bowls (~3).  The whole arm is weak and can be painful if I use it too much.  Currently having difficulty with push/pull on anything (doors etc.), lifting, weight bearing.  Cleaning around the home is very difficult.  Haven't tried using computer at home, I've been using my tablet.      Quality of life: good    Pain  Current pain ratin  At best pain ratin  At worst pain ratin (after a day of use of the arm/hand)  Location: L lateral wrist (Ulnar joint line)   Quality: throbbing and tight (Occasional shooting pain. )  Relieving factors: ice  Aggravating factors: lifting, repetitive movement and movement    Social Support  Lives with: alone    Hand dominance: right    Diagnostic Tests  X-ray: normal    Patient Goals  Patient goals for therapy: increased strength, independence with ADLs/IADLs, decreased pain and increased motion      :     Objective     Active Range of Motion     Left Wrist   Wrist flexion: 40 degrees   Wrist extension: 33 degrees   Radial deviation:  25 degrees   Ulnar deviation: 25 degrees     Right Wrist   Wrist flexion: 100 degrees   Wrist extension: 80 degrees   Radial deviation: 35 degrees   Ulnar deviation: 55 degrees     Additional Active Range of Motion Details  Wrist Supination: L: 45 R: 75  Wrist pronatin: : L 75 R: 90     Strength/Myotome Testing     Left Wrist/Hand   Wrist extension: 4  Wrist flexion: 4-  Radial deviation: 4  Ulnar deviation: 4-     (2nd hand position)     Trial 1: 18    Thumb Strength  Key/Lateral Pinch     [place] 1: 6  Palmar/Three-Point Pinch     Trial 1: 6    Right Wrist/Hand      (2nd hand position)     Trial 1: 59    Thumb Strength   Key/Lateral Pinch     Trial 1: 13  Palmar/Three-Point Pinch     Trial 1: 13     Assessment & Plan     Assessment  Impairments: abnormal coordination, abnormal or restricted ROM, activity intolerance, impaired physical strength, lacks appropriate home exercise program, pain with function and weight-bearing intolerance  Assessment details: Pt presents to PT with signs and symptoms of weakness and joint restriction as a result of healing fracture or the wrist.  Pt would benefit from skilled PT intervention to address the illustrated deficits above.    Prognosis: good  Prognosis details: Short Term Goals:  6 visits  1. Pt to demo understanding and compliance with HEP.   2. Pt to demonstrate increased  strength by 10 lbs for improved function and independence.   3. Pt to improve wrist ROM by 5 degrees (RD/UD), and 10 degrees (Flex/ext) for improved functionality and positioning for activity involving the UE.   4.  Pt to improve forearm and wrist strength to at least 4+/5 and pain less than 4/10 with testing for improved ability to lift (waist to OH), carry (50ft.) > 3# and push open door U x 10 each.     Long Term Goals: 6-8 weeks  1. Pt to score < 20% on DASH to demonstrate perceived improvement of function.    2. Pt to demonstrate increased  strength by 20 lbs for improved function and  independence. (increased all pinch by 5 lbs)  3. Pt to improve wrist ROM to WFL (ext: 60, Flexion: 60, Supination: 70)  for improved functionality and positioning for activity involving the UE.   4.  Pt to improve forearm and wrist strength to at least 5-/5 and pain less than 2/10 with testing for improved ability to lift, carry and push >5# (U) x 10      Functional Limitations: carrying objects, lifting, pulling, pushing, uncomfortable because of pain and unable to perform repetitive tasks  Plan  Planned modality interventions: cryotherapy, TENS and iontophoresis  Planned therapy interventions: manual therapy, neuromuscular re-education, soft tissue mobilization, strengthening, stretching, therapeutic activities, joint mobilization, home exercise program, functional ROM exercises and flexibility  Frequency: 2x week  Duration in visits: 10  Treatment plan discussed with: patient    Manual Therapy:    -     mins  32025;  Therapeutic Exercise:    15     mins  09198;     Neuromuscular Kelle:    -    mins  59367;    Therapeutic Activity:     -     mins  33505;     Gait Training:      -     mins  76866;     Ultrasound:     -     mins  33154;    Electrical Stimulation:    -     mins  14048 ( );  Dry Needling     -     mins self-pay    Timed Treatment:   15   mins   Total Treatment:     60   mins    PT SIGNATURE: SARAHI Echols License #: [id number]    DATE TREATMENT INITIATED: 9/29/2017    Medicare Initial Certification  Certification Period: 12/28/2017  I certify that the therapy services are furnished while this patient is under my care.  The services outlined above are required by this patient, and will be reviewed every 90 days.     PHYSICIAN: Darnell Sahu MD      DATE:     Please sign and return via fax to [phone].. Thank you, [facility].

## 2017-10-02 ENCOUNTER — TREATMENT (OUTPATIENT)
Dept: PHYSICAL THERAPY | Facility: CLINIC | Age: 72
End: 2017-10-02

## 2017-10-02 DIAGNOSIS — S52.325D CLOSED NONDISPLACED TRANSVERSE FRACTURE OF SHAFT OF LEFT RADIUS WITH ROUTINE HEALING, SUBSEQUENT ENCOUNTER: Primary | ICD-10-CM

## 2017-10-02 DIAGNOSIS — Z96.651 STATUS POST TOTAL RIGHT KNEE REPLACEMENT: ICD-10-CM

## 2017-10-02 PROCEDURE — 97140 MANUAL THERAPY 1/> REGIONS: CPT | Performed by: PHYSICAL THERAPIST

## 2017-10-02 PROCEDURE — 97110 THERAPEUTIC EXERCISES: CPT | Performed by: PHYSICAL THERAPIST

## 2017-10-02 NOTE — PROGRESS NOTES
Physical Therapy Daily Progress Note  Visits:2    Subjective : Carolyn Nicolas reports: The hand is doing better but I still shake to use it for doing much.  I still get some pretty sharp pain on the (Ulnar) side.      Objective   See Exercise, Manual, and Modality Logs for complete treatment.     Assessment/Plan:Pt tolerated initiation of manual intervention and did report less sharp pain wit wrist flexion following.      Progress per Plan of Care     Manual Therapy:    12     mins  78824;  Therapeutic Exercise:    30     mins  19838;     Neuromuscular Kelle:    -    mins  45358;    Therapeutic Activity:     -     mins  69244;     Gait Training:      -     mins  54008;     Ultrasound:     -     mins  00244;    Electrical Stimulation:    -     mins  37941 ( );  Dry Needling     -     mins self-pay    Timed Treatment:   42   mins   Total Treatment:     65   mins    SARAHI Echols License #[id number]    Physical Therapist

## 2017-10-05 ENCOUNTER — TREATMENT (OUTPATIENT)
Dept: PHYSICAL THERAPY | Facility: CLINIC | Age: 72
End: 2017-10-05

## 2017-10-05 DIAGNOSIS — S52.325D CLOSED NONDISPLACED TRANSVERSE FRACTURE OF SHAFT OF LEFT RADIUS WITH ROUTINE HEALING, SUBSEQUENT ENCOUNTER: Primary | ICD-10-CM

## 2017-10-05 DIAGNOSIS — Z96.651 STATUS POST TOTAL RIGHT KNEE REPLACEMENT: ICD-10-CM

## 2017-10-05 PROCEDURE — 97110 THERAPEUTIC EXERCISES: CPT | Performed by: PHYSICAL THERAPIST

## 2017-10-05 PROCEDURE — 97112 NEUROMUSCULAR REEDUCATION: CPT | Performed by: PHYSICAL THERAPIST

## 2017-10-05 PROCEDURE — G0283 ELEC STIM OTHER THAN WOUND: HCPCS | Performed by: PHYSICAL THERAPIST

## 2017-10-05 NOTE — PROGRESS NOTES
Physical Therapy Daily Progress Note  Visits:3    Subjective : Carolyn Nicolas reports: The side of my wrist is bothering me today.  The exercises are fine but still having point pain to the (ulnar styloid process).     Objective   See Exercise, Manual, and Modality Logs for complete treatment.     Assessment/Plan:Pt tolerated treatment well.  Noticeable decreased swelling with Stim and ice.  Kinesiotape applied to assist with blood flow and to support the carpal rows with AROM.      Progress per Plan of Care     Manual Therapy:    15     mins  59448;  Therapeutic Exercise:    30     mins  37140;     Neuromuscular Kelle:    10    mins  22211;    Therapeutic Activity:     -     mins  11851;     Gait Training:      -     mins  02301;     Ultrasound:     -     mins  94696;    Electrical Stimulation:    -     mins  41754 ( );  Dry Needling     -     mins self-pay    Timed Treatment:   55   mins   Total Treatment:     70   mins    SARAHI Echols License #[id number]    Physical Therapist

## 2017-10-06 ENCOUNTER — TREATMENT (OUTPATIENT)
Dept: PHYSICAL THERAPY | Facility: CLINIC | Age: 72
End: 2017-10-06

## 2017-10-06 DIAGNOSIS — Z96.651 STATUS POST TOTAL RIGHT KNEE REPLACEMENT: ICD-10-CM

## 2017-10-06 DIAGNOSIS — S52.325D CLOSED NONDISPLACED TRANSVERSE FRACTURE OF SHAFT OF LEFT RADIUS WITH ROUTINE HEALING, SUBSEQUENT ENCOUNTER: Primary | ICD-10-CM

## 2017-10-06 PROCEDURE — 97140 MANUAL THERAPY 1/> REGIONS: CPT | Performed by: PHYSICAL THERAPIST

## 2017-10-06 PROCEDURE — 97110 THERAPEUTIC EXERCISES: CPT | Performed by: PHYSICAL THERAPIST

## 2017-10-06 PROCEDURE — 97112 NEUROMUSCULAR REEDUCATION: CPT | Performed by: PHYSICAL THERAPIST

## 2017-10-06 NOTE — PROGRESS NOTES
Physical Therapy Daily Progress Note  Visits:4    Subjective : Carolyn Nicolas reports: The swelling is down but the fingers were really stiff when I woke up. The putty rolling on the table are really tough.      Objective   See Exercise, Manual, and Modality Logs for complete treatment.     Assessment/Plan:Pt tolerated treatment well. She continues to have joint restriction more prominently than soft tissue restrictions.      Progress per Plan of Care       Manual Therapy:    15     mins  10053;  Therapeutic Exercise:    30     mins  09442;     Neuromuscular Kelle:    8    mins  68120;    Therapeutic Activity:     -     mins  48912;     Gait Training:      -     mins  79144;     Ultrasound:     -     mins  60225;    Electrical Stimulation:    -     mins  57659 ( );  Dry Needling     -      mins self-pay    Timed Treatment:   53   mins   Total Treatment:     65   mins    SARAHI Echols License #[id number]    Physical Therapist

## 2017-10-09 ENCOUNTER — OFFICE VISIT (OUTPATIENT)
Dept: ORTHOPEDIC SURGERY | Facility: CLINIC | Age: 72
End: 2017-10-09

## 2017-10-09 VITALS — TEMPERATURE: 98 F | WEIGHT: 192 LBS | BODY MASS INDEX: 28.44 KG/M2 | HEIGHT: 69 IN

## 2017-10-09 DIAGNOSIS — M17.12 ARTHRITIS OF LEFT KNEE: ICD-10-CM

## 2017-10-09 DIAGNOSIS — Z96.651 HISTORY OF TOTAL KNEE ARTHROPLASTY, RIGHT: Primary | ICD-10-CM

## 2017-10-09 DIAGNOSIS — Z96.651 STATUS POST TOTAL RIGHT KNEE REPLACEMENT: ICD-10-CM

## 2017-10-09 PROCEDURE — 20610 DRAIN/INJ JOINT/BURSA W/O US: CPT | Performed by: NURSE PRACTITIONER

## 2017-10-09 PROCEDURE — 99213 OFFICE O/P EST LOW 20 MIN: CPT | Performed by: NURSE PRACTITIONER

## 2017-10-09 PROCEDURE — 73560 X-RAY EXAM OF KNEE 1 OR 2: CPT | Performed by: NURSE PRACTITIONER

## 2017-10-09 RX ORDER — LIDOCAINE HYDROCHLORIDE 10 MG/ML
2 INJECTION, SOLUTION EPIDURAL; INFILTRATION; INTRACAUDAL; PERINEURAL
Status: COMPLETED | OUTPATIENT
Start: 2017-10-09 | End: 2017-10-09

## 2017-10-09 RX ORDER — METHYLPREDNISOLONE ACETATE 80 MG/ML
80 INJECTION, SUSPENSION INTRA-ARTICULAR; INTRALESIONAL; INTRAMUSCULAR; SOFT TISSUE
Status: COMPLETED | OUTPATIENT
Start: 2017-10-09 | End: 2017-10-09

## 2017-10-09 RX ORDER — BUPIVACAINE HYDROCHLORIDE 5 MG/ML
2 INJECTION, SOLUTION EPIDURAL; INTRACAUDAL
Status: COMPLETED | OUTPATIENT
Start: 2017-10-09 | End: 2017-10-09

## 2017-10-09 RX ADMIN — METHYLPREDNISOLONE ACETATE 80 MG: 80 INJECTION, SUSPENSION INTRA-ARTICULAR; INTRALESIONAL; INTRAMUSCULAR; SOFT TISSUE at 08:20

## 2017-10-09 RX ADMIN — LIDOCAINE HYDROCHLORIDE 2 ML: 10 INJECTION, SOLUTION EPIDURAL; INFILTRATION; INTRACAUDAL; PERINEURAL at 08:20

## 2017-10-09 RX ADMIN — BUPIVACAINE HYDROCHLORIDE 2 ML: 5 INJECTION, SOLUTION EPIDURAL; INTRACAUDAL at 08:20

## 2017-10-09 NOTE — PATIENT INSTRUCTIONS
[facility]    KNEE HOME EXERCISES      It is important that you maintain and possibly improve your strength and range of motion of your knee.      •  Walk frequently for short intervals  •  Using a cane or walker to allow you to walk safely if needed  •  Avoid sitting for long periods of time to avoid cramping and swelling of your leg   •  Do exercises either on a bed or in a chair.  •  If any of the exercises cause you pain, either eliminate that exercise or decrease          the motion or repetitions      Start exercises with 10 repetitions and increase to 30 repetitions.  Do two sets of each exercise each day    ANKLE PUMPS    1. Move your feet up and down as if you are pumping on a gas pedal       STRAIGHT LEG RAISES    1. Lie flat with your injured leg straight.  Keep the other leg bent.  2. Tighten the injured leg's thigh muscle.  3. Lift leg, with knee locked in the straight position no higher than the other knee for  seconds  4. Lower leg slowly. Rest for 5 seconds      SHORT ARC QUAD    1. Lie with both knees bent over a pillow  2. Straighten both knees  3. Hold 5 seconds  4. Bend knees back to starting position and repeat sequence       QUADRICEPS     1. Lie flat with your injured let straight.  Keep the other leg bent.  2. Tighten the injured leg's thigh muscle by trying to move your kneecap toward the  thigh.  3. Make your leg as stiff as you can.  4. Hold for 5 seconds and relax for 5 seconds        HAMSTRING STRETCH    1. Lie flat with your injured leg straight. Keep the other leg bent  2. Press your heel of your injured leg into the floor for 5 seconds       OR  1. Sit with injured leg out straight.   2. Loop a sheet around the ball of foot and toes.  3. Gently pull on the sheet, keeping the leg straight  4. Hold for 10-30 seconds      KNEE FLEXION-EXTENSION SITTING     1. Sit with injured let bent as shown  2. Straighten leg at the knee  3. Hold 5 seconds  4. Bend knee back  to starting position   5. Rest for 2-5 seconds and repeat sequence       HEEL SLIDES     1. Lie on back with legs straight  2. Slide heels toward buttocks  3. Return to starting position       HEEL SLIDS WITH SHEET    1. Lie on your back with a sheet wrapped around your foot  2. Pull on the sheet to assist you in bending your knee and sliding your heel toward  your buttocks  3. Hold for 5 seconds        KNEE FLEXION STRETCH    1. Sit in a chair  2. Bend bad knee back as far as you can   3. Hold stretch for 5-10 seconds      CHAIR PUSH UPS    1. Sit in a chair with arm rests  2. Place hands on armrests  3. Straighten arms, raising buttocks up off of chair         WALL SLIDES    1. Stand with your back and head against a wall.    2. Keep your feet shoulder width apart and 6-12 inches from the wall  3. Slowly slide down the wall until your knees are slightly bent.    4. Hold for 5 seconds and then slowly slide back up  5. As you get stronger, then you can slowly increase the bend in your knees, but do  not drop your buttocks below the level of your knees       STEP UPS    1. Stand with your foot on your injured leg on a 3-5 inch support (such as a block of  wood)  2. Place other foot on the floor  3. Shift your weight onto the foot on the block and try to straighten the knee and  raising the other foot off of the floor  4. Slowly lower your foot until only the heel touches the floor

## 2017-10-09 NOTE — PROGRESS NOTES
NAME: Carolyn Nicolas  : 1945   MRN: [id number]   DATE: 10/9/2017    CC: 8 months Follow up status post total joint replacement, left knee  pain    SUBJECTIVE:    HPI: Patient returns today for a follow up of total joint replacement. She fell on  on the right knee and has increased swelling of the right leg since then.  Her ROM remains 5/108 according t the amount of swelling.  She cannot take NSAIDS due to heart and medrol dose pack due to diabetes. Advised to try OTC joint supplements/antiinflammatories instead.  Left knee is also bothering her today and is getting ready to go on vacation.  This is aching generalized acute on chronic hasnt had cortisone inj since last year that did help some.   HPI    This problem is not new to this examiner.     Allergies:   Allergies   Allergen Reactions   • Amoxicillin Nausea And Vomiting       Medications:   Home Medications:  Current Outpatient Prescriptions on File Prior to Visit   Medication Sig   • atorvastatin (LIPITOR) 20 MG tablet Take 20 mg by mouth Every Night.   • B-D UF III MINI PEN NEEDLES 31G X 5 MM misc INJECT 1 EACH INTO THE SKIN DAILY   • BD PEN NEEDLE JACOB U/F 32G X 4 MM misc    • Calcium Carbonate-Vit D-Min (CALCIUM 1200 PO) Take  by mouth.   • carvedilol (COREG) 6.25 MG tablet Take 6.25 mg by mouth 2 (Two) Times a Day.   • ENTRESTO 49-51 MG tablet TAKE 1 TABLET TWICE A DAY   • esomeprazole (NexIUM) 40 MG capsule Take 40 mg by mouth Every Morning Before Breakfast. TAKE 1 CAPSULE BY MOUTH EVERY MORNING   • fenofibrate (TRICOR) 48 MG tablet Take 48 mg by mouth Daily.   • furosemide (LASIX) 40 MG tablet Take 40 mg by mouth Daily As Needed.   • glimepiride (AMARYL) 4 MG tablet    • Insulin Pen Needle (B-D UF III MINI PEN NEEDLES) 31G X 5 MM misc INJECT 1 EACH INTO THE SKIN DAILY   • Liraglutide 18 MG/3ML solution pen-injector Inject  under the skin daily.   • ONE TOUCH ULTRA TEST test strip USE TO TEST BLOOD SUGAR FOUR TIMES DAILY (DX: 250.02)    • ONETOUCH DELICA LANCETS 33G misc USE TO TEST BLOOD SUGAR FOUR TIMES DAILY (DX:250.02)   • pramipexole (MIRAPEX) 0.5 MG tablet Take 0.5 mg by mouth Every Night.   • vitamin D (ERGOCALCIFEROL) 60642 UNITS capsule capsule Take 50,000 Units by mouth 1 (One) Time Per Week.     No current facility-administered medications on file prior to visit.        Current Medications:  Scheduled Meds:  Continuous Infusions:  No current facility-administered medications for this visit.   PRN Meds:.    I have reviewed the patient's medical history in detail and updated the computerized patient record.  Review and summarization of old records include:    Past Medical History:   Diagnosis Date   • Anemia    • Arthritis    • Cataract    • CHF (congestive heart failure)    • Chronic kidney disease    • Coronary artery disease    • Diabetes mellitus    • Hypertension    • Implantable cardioverter-defibrillator discharge    • Osteopenia    • Shortness of breath         Past Surgical History:   Procedure Laterality Date   • CARDIAC DEFIBRILLATOR PLACEMENT     • CARDIAC DEFIBRILLATOR PLACEMENT     • CORONARY ANGIOPLASTY WITH STENT PLACEMENT  2010   • ENDOSCOPY AND COLONOSCOPY N/A 3/8/2016    Procedure: ESOPHAGOGASTRODUODENOSCOPY AND COLONOSCOPY;  Surgeon: Dwight Monae MD;  Location: [facility] ENDOSCOPY;  Service:    • EYE SURGERY     • JOINT REPLACEMENT     • KNEE ARTHROSCOPY Bilateral 2009 2007   • TOTAL KNEE ARTHROPLASTY Right 2/14/2017    Procedure: TOTAL KNEE ARTHROPLASTY WITH MARGE NAVIGATION;  Surgeon: Andrew Monae MD;  Location: [facility] MAIN OR;  Service:         Social History     Occupational History   • Teacher Retired     Social History Main Topics   • Smoking status: Never Smoker   • Smokeless tobacco: Never Used   • Alcohol use No   • Drug use: No   • Sexual activity: Defer    Social History     Social History Narrative        Family History   Problem Relation Age of Onset   • Diabetes Mother    • Heart disease Mother     • Heart disease Father    • Cancer Father 85     Bladder   • Cancer Brother 58     Bladder   • Heart disease Maternal Grandmother    • Heart disease Maternal Grandfather        ROS: 14 point review of systems was performed and was negative except for documented findings in HPI and today's encounter.     Allergies:   Allergies   Allergen Reactions   • Amoxicillin Nausea And Vomiting     Constitutional:  Denies fever, shaking or chills   Eyes:  Denies change in visual acuity   HENT:  Denies nasal congestion or sore throat   Respiratory:  Denies cough or shortness of breath   Cardiovascular:  Denies chest pain or severe LE edema   GI:  Denies abdominal pain, nausea, vomiting, bloody stools or diarrhea   Musculoskeletal:  Denies numbness tingling or loss of motor function except as outlined above in history of present illness.  : Denies painful urination or hematuria  Integument:  Denies rash, lesion or ulceration   Neurologic:  Denies headache or focal weakness  Endocrine:  Denies lymphadenopathy  Psych:  Denies confusion or change in mental status   Hem:  Denies active bleeding        OBJECTIVE:     Physical Exam:  Vital Signs:  Body mass index is 28.35 kg/(m^2).  Vitals:    10/09/17 0757   Temp: 98 °F (36.7 °C)       Constitutional: Awake alert and oriented x3, well developed, well nourished, no acute distress, non-toxic appearance.  HEENT:  Normocephalic, Atraumatic, Bilateral external ears normal, Oropharynx moist, No oral exudates, Nose normal.   Respiratory:  No respiratory distress, No wheezing  CV: No palpitations  Vascular:  Brisk cap refill, Intact distal pulses, No cyanosis, all compartments soft with no signs or symptoms of compartment syndrome or DVT.  Neurologic: Sensation grossly intact to light touch throughout the involved extremity and bilaterally symmetric, deep tendon reflexes are 2+ and bilaterally symmetric, No focal deficits noted.   Neck:  Normal range of motion, No tenderness, Supple,  Negative Spurlings.  Integument: Well hydrated, no rash, warm, dry, no lesions or ulceration.   Musculoskeletal:  Affected extremity post op incision is healed appropriately. No sign of infection. Range of motion is about the same as last visit 6/108 varies according to swelling. The calf is soft and nontender with a negative Homans sign. Distal pulses intact. Right leg has  Increased swelling vs left since fall. Left knee with synovitis crepitation and swelling of the knee joint distal swelling negative.     DIAGNOSTIC STUDIES  Xrays: 2 views of the right knee (AP full length and lateral) were ordered and reviewed for evaluation of past joint replacement. They demonstrate a well positioned, well aligned total joint replacement without complicating factors noted. In comparison with the film from the last office visit, there has been no alignment change.    ASSESSMENT: Status post right total knee replacement with expected healing but increased swelling due to reinjury, left knee arthritis     Large Joint Arthrocentesis  Date/Time: 10/9/2017 8:20 AM  Consent given by: patient  Site marked: site marked  Timeout: Immediately prior to procedure a time out was called to verify the correct patient, procedure, equipment, support staff and site/side marked as required   Supporting Documentation  Indications: pain and joint swelling   Procedure Details  Location: knee - L knee  Preparation: Patient was prepped and draped in the usual sterile fashion  Needle size: 22 G  Approach: anterolateral  Medications administered: 80 mg methylPREDNISolone acetate 80 MG/ML; 2 mL bupivacaine (PF) 0.5 %; 2 mL lidocaine PF 1% 1 %  Patient tolerance: patient tolerated the procedure well with no immediate complications            PLAN: 1) Continue home PT exercises    2) Continue with antibiotic prophylaxis with dental procedures for 2 yrs post total joint replacement.   3) Follow up as ordered.   4)  She fell on July 8th on the right knee  and has increased swelling of the right leg since then.  Her ROM remains 5/108 according t the amount of swelling.  She cannot take NSAIDS due to heart and medrol dose pack due to diabetes. Advised to try OTC joint supplements/antiinflammatories (aspercream) instead.  Left knee is also bothering her today and is getting ready to go on vacation so will give cortisone inj in that knee today.   3mo f/u bilat knees with xr    10/9/2017  Patient was seen by NAOMI Potter in the office today.

## 2017-10-11 ENCOUNTER — TREATMENT (OUTPATIENT)
Dept: PHYSICAL THERAPY | Facility: CLINIC | Age: 72
End: 2017-10-11

## 2017-10-11 DIAGNOSIS — S52.325D CLOSED NONDISPLACED TRANSVERSE FRACTURE OF SHAFT OF LEFT RADIUS WITH ROUTINE HEALING, SUBSEQUENT ENCOUNTER: Primary | ICD-10-CM

## 2017-10-11 PROCEDURE — 97110 THERAPEUTIC EXERCISES: CPT | Performed by: PHYSICAL THERAPIST

## 2017-10-11 PROCEDURE — 97530 THERAPEUTIC ACTIVITIES: CPT | Performed by: PHYSICAL THERAPIST

## 2017-10-11 PROCEDURE — 97140 MANUAL THERAPY 1/> REGIONS: CPT | Performed by: PHYSICAL THERAPIST

## 2017-10-11 NOTE — PROGRESS NOTES
Physical Therapy Daily Progress Note  Visits:5    Subjective : Carolyn Nicolas reports: I've been doing some sewing and that helped the thumb strength.  I'm starting to feel some more motion in the (palmar surface) of the wrist and (distal) forearm.      Objective:   See Exercise, Manual, and Modality Logs for complete treatment.     Assessment/Plan:Pt tolerated treatment well but still demonstrates significant 3rd/4th finger extension restriction with pain PROM.  Wrist extension coming along well but with coupled finger extension, she exhibits notable limitation.      Progress per Plan of Care       Manual Therapy:    12     mins  10570;  Therapeutic Exercise:    35     mins  00519;     Neuromuscular Kelle:        mins  29637;    Therapeutic Activity:     6     mins  46283;     Gait Training:      -     mins  10490;     Ultrasound:     -     mins  54575;    Electrical Stimulation:    -     mins  84705 ( );  Dry Needling     -     mins self-pay    Timed Treatment:   53   mins   Total Treatment:     68   mins    SARAHI Echols License #[id number]    Physical Therapist

## 2017-10-12 ENCOUNTER — TREATMENT (OUTPATIENT)
Dept: PHYSICAL THERAPY | Facility: CLINIC | Age: 72
End: 2017-10-12

## 2017-10-12 DIAGNOSIS — S52.325D CLOSED NONDISPLACED TRANSVERSE FRACTURE OF SHAFT OF LEFT RADIUS WITH ROUTINE HEALING, SUBSEQUENT ENCOUNTER: Primary | ICD-10-CM

## 2017-10-12 PROCEDURE — 97110 THERAPEUTIC EXERCISES: CPT | Performed by: PHYSICAL THERAPIST

## 2017-10-12 NOTE — PROGRESS NOTES
Physical Therapy Daily Progress Note  Visits:6    Subjective : Carolyn Nicolas reports: The hand and wrist are doing better seemingly every couple of days.  I was able to button some jeans today.  I was able to get all packed for my out of town trip too.  Still troublesome carrying/lifting my luggage though.      Objective   Active Range of Motion      Left Wrist   Wrist flexion: 60 degrees   Wrist extension: 45 degrees   Radial deviation: 40 degrees   Ulnar deviation: 32 degrees      Right Wrist   Wrist flexion: 100 degrees   Wrist extension: 80 degrees   Radial deviation: 35 degrees   Ulnar deviation: 55 degrees     Additional Active Range of Motion Details  Wrist Supination: L: 42 R: 75  Wrist pronatin: : L 82 R: 90      Strength/Myotome Testing      Left Wrist/Hand   Wrist extension: 4+  Wrist flexion: 4+  Radial deviation: 4+  Ulnar deviation: 4+      (2nd hand position)     Trial 1: 24     Thumb Strength  Key/Lateral Pinch     [place] 1: 7  Palmar/Three-Point Pinch     Trial 1: 7     Right Wrist/Hand       (2nd hand position)     Trial 1: 59     Thumb Strength   Key/Lateral Pinch     Trial 1: 13  Palmar/Three-Point Pinch     Trial 1: 13  See Exercise, Manual, and Modality Logs for complete treatment.     Assessment/Plan:Pt tolerated treatment well overall.  She will be traveling out of town for 2 weeks but I feel confident that she will be able to maintain HEP and continue to progress appropriately.  She will be seen for additional weeks as she returns.      Progress per Plan of Care     Manual Therapy:    -     mins  18412;  Therapeutic Exercise:    40     mins  94920;     Neuromuscular Kelle:    6    mins  61780;    Therapeutic Activity:     -     mins  02425;     Gait Training:      -     mins  76919;     Ultrasound:     -     mins  40873;    Electrical Stimulation:    --     mins  78456 ( );  Dry Needling     -     mins self-pay    Timed Treatment:   46   mins   Total Treatment:     68    vanessa Howell, PT  [place] License #[id number]    Physical Therapist

## 2017-10-25 ENCOUNTER — TREATMENT (OUTPATIENT)
Dept: PHYSICAL THERAPY | Facility: CLINIC | Age: 72
End: 2017-10-25

## 2017-10-25 DIAGNOSIS — S52.325D CLOSED NONDISPLACED TRANSVERSE FRACTURE OF SHAFT OF LEFT RADIUS WITH ROUTINE HEALING, SUBSEQUENT ENCOUNTER: Primary | ICD-10-CM

## 2017-10-25 DIAGNOSIS — Z96.651 STATUS POST TOTAL RIGHT KNEE REPLACEMENT: ICD-10-CM

## 2017-10-25 PROCEDURE — 97110 THERAPEUTIC EXERCISES: CPT | Performed by: PHYSICAL THERAPIST

## 2017-10-25 NOTE — PROGRESS NOTES
Physical Therapy Daily Progress Note  Visits:7    Subjective : Carolyn Nicolas reports: I did pretty well on my vacation.  I spent a lot of time in the pool and the swelling has really come down.  I had a little trouble on the way back with carrying/moving luggage.  Otherwise, it was okay.      Objective   See Exercise, Manual, and Modality Logs for complete treatment.     Assessment/Plan:Pt has done very well with PT intervention and is tolerating WBing and impact to the hand and wrist much better since return from vacation.  Will continue to progress appropriately     Progress per Plan of Care       Manual Therapy:    5     mins  60539;  Therapeutic Exercise:    35     mins  16248;     Neuromuscular Kelle:    -    mins  17348;    Therapeutic Activity:     -     mins  89262;     Gait Training:      -     mins  36049;     Ultrasound:     -     mins  11261;    Electrical Stimulation:    --     mins  85884 ( );  Dry Needling     -     mins self-pay    Timed Treatment:   40   mins   Total Treatment:     65   mins    SARAHI Echols License #[id number]    Physical Therapist

## 2017-10-30 ENCOUNTER — TREATMENT (OUTPATIENT)
Dept: PHYSICAL THERAPY | Facility: CLINIC | Age: 72
End: 2017-10-30

## 2017-10-30 DIAGNOSIS — S52.325D CLOSED NONDISPLACED TRANSVERSE FRACTURE OF SHAFT OF LEFT RADIUS WITH ROUTINE HEALING, SUBSEQUENT ENCOUNTER: Primary | ICD-10-CM

## 2017-10-30 PROCEDURE — 97110 THERAPEUTIC EXERCISES: CPT | Performed by: PHYSICAL THERAPIST

## 2017-10-30 NOTE — PROGRESS NOTES
Physical Therapy Daily Progress Note  Visits:8    Subjective : Carolyn Nicolas reports: Doing pretty well overall.  I still have some reservation pushing/pulling heavy doors    Objective   See Exercise, Manual, and Modality Logs for complete treatment.     Assessment/Plan:Pt tolerated TE and progression well.  No pain in wrist with simulated pull and push of door, felt in the shoulder.       Progress per Plan of Care         Manual Therapy:    -     mins  49168;  Therapeutic Exercise:    30     mins  16308;     Neuromuscular Kelle:    -    mins  16019;    Therapeutic Activity:     8     mins  00866;     Gait Training:      -     mins  32132;     Ultrasound:     -     mins  80453;    Electrical Stimulation:    -     mins  63486 ( );  Dry Needling     -     mins self-pay    Timed Treatment:   38   mins   Total Treatment:     65   mins    SARAHI Echols License #[id number]    Physical Therapist

## 2017-11-02 ENCOUNTER — TREATMENT (OUTPATIENT)
Dept: PHYSICAL THERAPY | Facility: CLINIC | Age: 72
End: 2017-11-02

## 2017-11-02 DIAGNOSIS — S52.325D CLOSED NONDISPLACED TRANSVERSE FRACTURE OF SHAFT OF LEFT RADIUS WITH ROUTINE HEALING, SUBSEQUENT ENCOUNTER: Primary | ICD-10-CM

## 2017-11-02 DIAGNOSIS — Z96.651 STATUS POST TOTAL RIGHT KNEE REPLACEMENT: ICD-10-CM

## 2017-11-02 PROCEDURE — 97110 THERAPEUTIC EXERCISES: CPT | Performed by: PHYSICAL THERAPIST

## 2017-11-02 NOTE — PROGRESS NOTES
Physical Therapy Discharge Note  Visits:9    Subjective : Carolyn Nicolas reports: Doing pretty good overall with the wrist and hand.  Its coming along and having less pain to the side of the wrist (Ulnar side).  I don't really have any pain with my day to day activity.      Objective :  Objective   Active Range of Motion       Left Wrist   Wrist flexion: 75 degrees   Wrist extension: 60 degrees (80 PROM)   Radial deviation: 40 degrees   Ulnar deviation: 43 degrees       Right Wrist   Wrist flexion: 100 degrees   Wrist extension: 80 degrees   Radial deviation: 35 degrees   Ulnar deviation: 55 degrees     Additional Active Range of Motion Details  Wrist Supination: L: 42 R: 75  Wrist pronatin: : L 82 R: 90       Strength/Myotome Testing       Left Wrist/Hand   Wrist extension: 5-/5  Wrist flexion: 5-/5  Radial deviation: 5-/5  Ulnar deviation: 5-/5       (2nd hand position)     Trial 1: 35      Thumb Strength  Key/Lateral Pinch     [place] 1: 11  Palmar/Three-Point Pinch     Trial 1: 10 max       Right Wrist/Hand        (2nd hand position)     Trial 1: 59      Thumb Strength   Key/Lateral Pinch     Trial 1: 13  Palmar/Three-Point Pinch     Trial 1: 13    See Exercise, Manual, and Modality Logs for complete treatment.     Assessment & Plan       Goals  Plan Goals: Short Term Goals:  6 visits  1. Pt to demo understanding and compliance with HEP.   2. Pt to demonstrate increased  strength by 10 lbs for improved function and independence.   3. Pt to improve wrist ROM by 5 degrees (RD/UD), and 10 degrees (Flex/ext) for improved functionality and positioning for activity involving the UE.   4.  Pt to improve forearm and wrist strength to at least 4+/5 and pain less than 4/10 with testing for improved ability to lift (waist to OH), carry (50ft.) > 3# and push open door U x 10 each.     Long Term Goals: 6-8 weeks  1. Pt to score < 20% on DASH to demonstrate perceived improvement of function. (MET)    2. Pt to  demonstrate increased  strength by 20 lbs for improved function and independence. (increased all pinch by 5 lbs) (MET)  3. Pt to improve wrist ROM to WFL (ext: 60, Flexion: 60, Supination: 70)  for improved functionality and positioning for activity involving the UE. (MET)  4.  Pt to improve forearm and wrist strength to at least 5-/5 and pain less than 2/10 with testing for improved ability to lift, carry and push >5# (U) x 10  (MET)    Plan  Therapy options: will not be seen for skilled physical therapy services  Planned therapy interventions: home exercise program  Treatment plan discussed with: patient           Manual Therapy:    -     mins  96624;  Therapeutic Exercise:    35     mins  30030;     Neuromuscular Kelle:    -    mins  09444;    Therapeutic Activity:     4     mins  86557;     Gait Training:      -     mins  84820;     Ultrasound:     -     mins  81177;    Electrical Stimulation:    -     mins  60626 ( );  Dry Needling     -     mins self-pay    Timed Treatment:   39   mins   Total Treatment:     60   mins    SARAHI Echols License #[id number]    Physical Therapist

## 2018-01-11 ENCOUNTER — OFFICE VISIT (OUTPATIENT)
Dept: ORTHOPEDIC SURGERY | Facility: CLINIC | Age: 73
End: 2018-01-11

## 2018-01-11 VITALS — WEIGHT: 190 LBS | BODY MASS INDEX: 28.14 KG/M2 | HEIGHT: 69 IN | TEMPERATURE: 99 F

## 2018-01-11 DIAGNOSIS — G89.29 CHRONIC PAIN OF LEFT KNEE: ICD-10-CM

## 2018-01-11 DIAGNOSIS — M17.12 ARTHRITIS OF LEFT KNEE: ICD-10-CM

## 2018-01-11 DIAGNOSIS — Z96.651 HISTORY OF TOTAL RIGHT KNEE REPLACEMENT: Primary | ICD-10-CM

## 2018-01-11 DIAGNOSIS — M25.562 CHRONIC PAIN OF LEFT KNEE: ICD-10-CM

## 2018-01-11 PROCEDURE — 99213 OFFICE O/P EST LOW 20 MIN: CPT | Performed by: ORTHOPAEDIC SURGERY

## 2018-01-11 PROCEDURE — 73562 X-RAY EXAM OF KNEE 3: CPT | Performed by: ORTHOPAEDIC SURGERY

## 2018-01-11 NOTE — PROGRESS NOTES
Patient Name: Carolyn Nicolas   YOB: 1945  Referring Primary Care Physician: Greta Humphrey MD      Chief Complaint:    Chief Complaint   Patient presents with   • Right Knee - Post-op, Follow-up        Subjective:    HPI:   Carolyn Nicolas is a pleasant 72 y.o. year old who presents today for evaluation of   Chief Complaint   Patient presents with   • Right Knee - Post-op, Follow-up   almost a year sp right tka.  Subjective stiffness. Left bothers some but not enough to replace yet.  Pleased.    This problem is not new to this examiner.     Medications:   Home Medications:  Current Outpatient Prescriptions on File Prior to Visit   Medication Sig   • atorvastatin (LIPITOR) 20 MG tablet Take 20 mg by mouth Every Night.   • B-D UF III MINI PEN NEEDLES 31G X 5 MM misc INJECT 1 EACH INTO THE SKIN DAILY   • BD PEN NEEDLE JACOB U/F 32G X 4 MM misc    • Calcium Carbonate-Vit D-Min (CALCIUM 1200 PO) Take  by mouth.   • carvedilol (COREG) 6.25 MG tablet Take 6.25 mg by mouth 2 (Two) Times a Day.   • ENTRESTO 49-51 MG tablet TAKE 1 TABLET TWICE A DAY   • esomeprazole (NexIUM) 40 MG capsule Take 40 mg by mouth Every Morning Before Breakfast. TAKE 1 CAPSULE BY MOUTH EVERY MORNING   • fenofibrate (TRICOR) 48 MG tablet Take 48 mg by mouth Daily.   • furosemide (LASIX) 40 MG tablet Take 40 mg by mouth Daily As Needed.   • glimepiride (AMARYL) 4 MG tablet    • Liraglutide 18 MG/3ML solution pen-injector Inject  under the skin daily.   • pramipexole (MIRAPEX) 0.5 MG tablet Take 0.5 mg by mouth Every Night.   • vitamin D (ERGOCALCIFEROL) 86281 UNITS capsule capsule Take 50,000 Units by mouth 1 (One) Time Per Week.   • Insulin Pen Needle (B-D UF III MINI PEN NEEDLES) 31G X 5 MM misc INJECT 1 EACH INTO THE SKIN DAILY   • ONE TOUCH ULTRA TEST test strip USE TO TEST BLOOD SUGAR FOUR TIMES DAILY (DX: 250.02)   • ONETOUCH DELICA LANCETS 33G misc USE TO TEST BLOOD SUGAR FOUR TIMES DAILY (DX:250.02)     No current  facility-administered medications on file prior to visit.      Current Medications:  Scheduled Meds:  Continuous Infusions:  No current facility-administered medications for this visit.   PRN Meds:.    I have reviewed the patient's medical history in detail and updated the computerized patient record.  Review and summarization of old records includes:    Past Medical History:   Diagnosis Date   • Anemia    • Arthritis    • Cataract    • CHF (congestive heart failure)    • Chronic kidney disease    • Coronary artery disease    • Diabetes mellitus    • Hypertension    • Implantable cardioverter-defibrillator discharge    • Osteopenia    • Shortness of breath    • Sleep apnea         Past Surgical History:   Procedure Laterality Date   • CARDIAC DEFIBRILLATOR PLACEMENT     • CARDIAC DEFIBRILLATOR PLACEMENT     • CORONARY ANGIOPLASTY WITH STENT PLACEMENT  2010   • ENDOSCOPY AND COLONOSCOPY N/A 3/8/2016    Procedure: ESOPHAGOGASTRODUODENOSCOPY AND COLONOSCOPY;  Surgeon: Dwight Monae MD;  Location: [facility] ENDOSCOPY;  Service:    • EYE SURGERY     • JOINT REPLACEMENT     • KNEE ARTHROSCOPY Bilateral 2009 2007   • TOTAL KNEE ARTHROPLASTY Right 2/14/2017    Procedure: TOTAL KNEE ARTHROPLASTY WITH MARGE NAVIGATION;  Surgeon: Andrew Monae MD;  Location: [facility] MAIN OR;  Service:         Social History     Occupational History   • Teacher Retired     Social History Main Topics   • Smoking status: Never Smoker   • Smokeless tobacco: Never Used   • Alcohol use No   • Drug use: No   • Sexual activity: Defer    Social History     Social History Narrative        Family History   Problem Relation Age of Onset   • Diabetes Mother    • Heart disease Mother    • Heart disease Father    • Cancer Father 85     Bladder   • Cancer Brother 58     Bladder   • Heart disease Maternal Grandmother    • Heart disease Maternal Grandfather        ROS: 14 point review of systems was performed and all other systems were reviewed and are  negative except for documented findings in HPI and today's encounter.     Allergies:   Allergies   Allergen Reactions   • Amoxicillin Nausea And Vomiting     Patient states that she does not have an issue with this anymore.     Constitutional:  Denies fever, shaking or chills   Eyes:  Denies change in visual acuity   HENT:  Denies nasal congestion or sore throat   Respiratory:  Denies cough or shortness of breath   Cardiovascular:  Denies chest pain or severe LE edema   GI:  Denies abdominal pain, nausea, vomiting, bloody stools or diarrhea   Musculoskeletal:  Numbness, tingling, pain, or loss of motor function only as noted above in history of present illness.  : Denies painful urination or hematuria  Integument:  Denies rash, lesion or ulceration   Neurologic:  Denies headache or focal weakness  Endocrine:  Denies lymphadenopathy  Psych:  Denies confusion or change in mental status   Hem:  Denies active bleeding    Objective:    Physical Exam: 72 y.o. female  Body mass index is 28.06 kg/(m^2)., @WT@, @HT@  Vitals:    01/11/18 0759   Temp: 99 °F (37.2 °C)     Vital signs reviewed.   General Appearance:    Alert, cooperative, in no acute distress                  Eyes: conjunctiva clear  ENT: external ears and nose atraumatic  CV: no peripheral edema  Resp: normal respiratory effort  Skin: no rashes or wounds; normal turgor  Psych: mood and affect appropriate  Lymph: no nodes appreciated  Neuro: gross sensation intact  Vascular:  Palpable peripheral pulse in noted extremity  Musculoskeletal Extremities: -2-110 right with good stability, left -10- 100, varus, crep    Radiology:   Imaging done today and discussed at length with the patient:    Indication: pain related symptoms,  Views: 3V AP, LAT & 40 degree PA bilateral knee(s)   Findings: good position on the right and left knee aadvanced OA  Comparison views: viewed last xray done in the office.       Assessment:     ICD-10-CM ICD-9-CM   1. History of total right  knee replacement Z96.651 V43.65   2. Chronic pain of left knee M25.562 719.46    G89.29 338.29   3. Arthritis of left knee M17.12 716.96        Procedures       Plan: Biomechanics of pertinent body area discussed.  Risks, benefits, alternatives, comparisons, and complications of accepted medicines, injections, recommendations, surgical procedures, and therapies explained and education provided in laymen's terms. The patient was given the opportunity to ask questions and they were answerved to their satisfaction.   Natural history and expected course of this patient's diagnosis discussed along with evaluation of therapies. Questions answered.   Fu when needed      1/11/2018

## 2018-05-18 ENCOUNTER — OFFICE (OUTPATIENT)
Dept: URBAN - METROPOLITAN AREA CLINIC 65 | Facility: CLINIC | Age: 73
End: 2018-05-18

## 2018-05-18 VITALS
DIASTOLIC BLOOD PRESSURE: 70 MMHG | HEIGHT: 69 IN | WEIGHT: 200 LBS | SYSTOLIC BLOOD PRESSURE: 112 MMHG | HEART RATE: 64 BPM

## 2018-05-18 DIAGNOSIS — Z86.010 PERSONAL HISTORY OF COLONIC POLYPS: ICD-10-CM

## 2018-05-18 DIAGNOSIS — K76.9 LIVER DISEASE, UNSPECIFIED: ICD-10-CM

## 2018-05-18 DIAGNOSIS — K21.9 GASTRO-ESOPHAGEAL REFLUX DISEASE WITHOUT ESOPHAGITIS: ICD-10-CM

## 2018-05-18 PROCEDURE — 99212 OFFICE O/P EST SF 10 MIN: CPT | Performed by: INTERNAL MEDICINE

## 2018-05-23 ENCOUNTER — TRANSCRIBE ORDERS (OUTPATIENT)
Dept: ADMINISTRATIVE | Facility: HOSPITAL | Age: 73
End: 2018-05-23

## 2018-05-23 ENCOUNTER — HOSPITAL ENCOUNTER (OUTPATIENT)
Dept: CARDIOLOGY | Facility: HOSPITAL | Age: 73
Discharge: HOME | End: 2018-05-23
Admitting: INTERNAL MEDICINE

## 2018-05-23 DIAGNOSIS — M79.605 LEFT LEG PAIN: ICD-10-CM

## 2018-05-23 DIAGNOSIS — M79.605 LEFT LEG PAIN: Primary | ICD-10-CM

## 2018-05-23 LAB
BH CV LOWER VASCULAR LEFT COMMON FEMORAL AUGMENT: NORMAL
BH CV LOWER VASCULAR LEFT COMMON FEMORAL COMPETENT: NORMAL
BH CV LOWER VASCULAR LEFT COMMON FEMORAL COMPRESS: NORMAL
BH CV LOWER VASCULAR LEFT COMMON FEMORAL PHASIC: NORMAL
BH CV LOWER VASCULAR LEFT COMMON FEMORAL SPONT: NORMAL
BH CV LOWER VASCULAR LEFT DISTAL FEMORAL COMPRESS: NORMAL
BH CV LOWER VASCULAR LEFT GASTRONEMIUS COMPRESS: NORMAL
BH CV LOWER VASCULAR LEFT GREATER SAPH AK COMPRESS: NORMAL
BH CV LOWER VASCULAR LEFT GREATER SAPH BK COMPRESS: NORMAL
BH CV LOWER VASCULAR LEFT MID FEMORAL AUGMENT: NORMAL
BH CV LOWER VASCULAR LEFT MID FEMORAL COMPETENT: NORMAL
BH CV LOWER VASCULAR LEFT MID FEMORAL COMPRESS: NORMAL
BH CV LOWER VASCULAR LEFT MID FEMORAL PHASIC: NORMAL
BH CV LOWER VASCULAR LEFT MID FEMORAL SPONT: NORMAL
BH CV LOWER VASCULAR LEFT PERONEAL COMPRESS: NORMAL
BH CV LOWER VASCULAR LEFT POPLITEAL AUGMENT: NORMAL
BH CV LOWER VASCULAR LEFT POPLITEAL COMPETENT: NORMAL
BH CV LOWER VASCULAR LEFT POPLITEAL COMPRESS: NORMAL
BH CV LOWER VASCULAR LEFT POPLITEAL PHASIC: NORMAL
BH CV LOWER VASCULAR LEFT POPLITEAL SPONT: NORMAL
BH CV LOWER VASCULAR LEFT POSTERIOR TIBIAL COMPRESS: NORMAL
BH CV LOWER VASCULAR LEFT PROXIMAL FEMORAL COMPRESS: NORMAL
BH CV LOWER VASCULAR LEFT SAPHENOFEMORAL JUNCTION AUGMENT: NORMAL
BH CV LOWER VASCULAR LEFT SAPHENOFEMORAL JUNCTION COMPETENT: NORMAL
BH CV LOWER VASCULAR LEFT SAPHENOFEMORAL JUNCTION COMPRESS: NORMAL
BH CV LOWER VASCULAR LEFT SAPHENOFEMORAL JUNCTION PHASIC: NORMAL
BH CV LOWER VASCULAR LEFT SAPHENOFEMORAL JUNCTION SPONT: NORMAL
BH CV LOWER VASCULAR RIGHT COMMON FEMORAL AUGMENT: NORMAL
BH CV LOWER VASCULAR RIGHT COMMON FEMORAL COMPETENT: NORMAL
BH CV LOWER VASCULAR RIGHT COMMON FEMORAL COMPRESS: NORMAL
BH CV LOWER VASCULAR RIGHT COMMON FEMORAL PHASIC: NORMAL
BH CV LOWER VASCULAR RIGHT COMMON FEMORAL SPONT: NORMAL

## 2018-05-23 PROCEDURE — 93971 EXTREMITY STUDY: CPT

## 2018-05-23 NOTE — PROGRESS NOTES
LLLE venous doppler study complete. Preliminary negative for DVT called [phone] to give report no answer. Patient released

## 2018-07-02 ENCOUNTER — LAB (OUTPATIENT)
Dept: LAB | Facility: HOSPITAL | Age: 73
End: 2018-07-02
Attending: INTERNAL MEDICINE

## 2018-07-02 ENCOUNTER — TRANSCRIBE ORDERS (OUTPATIENT)
Dept: LAB | Facility: HOSPITAL | Age: 73
End: 2018-07-02

## 2018-07-02 DIAGNOSIS — N18.30 CHRONIC KIDNEY DISEASE, STAGE III (MODERATE) (HCC): ICD-10-CM

## 2018-07-02 DIAGNOSIS — N18.30 CHRONIC KIDNEY DISEASE, STAGE III (MODERATE) (HCC): Primary | ICD-10-CM

## 2018-07-02 LAB
ANION GAP SERPL CALCULATED.3IONS-SCNC: 11 MMOL/L
BACTERIA UR QL AUTO: ABNORMAL /HPF
BILIRUB UR QL STRIP: NEGATIVE
BUN BLD-MCNC: 22 MG/DL (ref 8–23)
BUN/CREAT SERPL: 16.1 (ref 7–25)
CALCIUM SPEC-SCNC: 9.6 MG/DL (ref 8.6–10.5)
CHLORIDE SERPL-SCNC: 107 MMOL/L (ref 98–107)
CLARITY UR: CLEAR
CO2 SERPL-SCNC: 25 MMOL/L (ref 22–29)
COLOR UR: YELLOW
CREAT BLD-MCNC: 1.37 MG/DL (ref 0.57–1)
CREAT UR-MCNC: 196.2 MG/DL
DEPRECATED RDW RBC AUTO: 46.8 FL (ref 37–54)
ERYTHROCYTE [DISTWIDTH] IN BLOOD BY AUTOMATED COUNT: 12.6 % (ref 11.7–13)
GFR SERPL CREATININE-BSD FRML MDRD: 38 ML/MIN/1.73
GLUCOSE BLD-MCNC: 178 MG/DL (ref 65–99)
GLUCOSE UR STRIP-MCNC: NEGATIVE MG/DL
HCT VFR BLD AUTO: 44.2 % (ref 35.6–45.5)
HGB BLD-MCNC: 13.7 G/DL (ref 11.9–15.5)
HGB UR QL STRIP.AUTO: NEGATIVE
HYALINE CASTS UR QL AUTO: ABNORMAL /LPF
KETONES UR QL STRIP: ABNORMAL
LEUKOCYTE ESTERASE UR QL STRIP.AUTO: ABNORMAL
MAGNESIUM SERPL-MCNC: 2.5 MG/DL (ref 1.6–2.4)
MCH RBC QN AUTO: 31.3 PG (ref 26.9–32)
MCHC RBC AUTO-ENTMCNC: 31 G/DL (ref 32.4–36.3)
MCV RBC AUTO: 100.9 FL (ref 80.5–98.2)
NITRITE UR QL STRIP: NEGATIVE
PH UR STRIP.AUTO: 6 [PH] (ref 5–8)
PHOSPHATE SERPL-MCNC: 2.5 MG/DL (ref 2.5–4.5)
PLATELET # BLD AUTO: 136 10*3/MM3 (ref 140–500)
PMV BLD AUTO: 12.9 FL (ref 6–12)
POTASSIUM BLD-SCNC: 5 MMOL/L (ref 3.5–5.2)
PROT UR QL STRIP: ABNORMAL
PROT UR-MCNC: 30 MG/DL
RBC # BLD AUTO: 4.38 10*6/MM3 (ref 3.9–5.2)
RBC # UR: ABNORMAL /HPF
REF LAB TEST METHOD: ABNORMAL
SODIUM BLD-SCNC: 143 MMOL/L (ref 136–145)
SP GR UR STRIP: 1.02 (ref 1–1.03)
SQUAMOUS #/AREA URNS HPF: ABNORMAL /HPF
UROBILINOGEN UR QL STRIP: ABNORMAL
WBC NRBC COR # BLD: 5.54 10*3/MM3 (ref 4.5–10.7)
WBC UR QL AUTO: ABNORMAL /HPF

## 2018-07-02 PROCEDURE — 85027 COMPLETE CBC AUTOMATED: CPT

## 2018-07-02 PROCEDURE — 83735 ASSAY OF MAGNESIUM: CPT

## 2018-07-02 PROCEDURE — 84156 ASSAY OF PROTEIN URINE: CPT

## 2018-07-02 PROCEDURE — 80048 BASIC METABOLIC PNL TOTAL CA: CPT

## 2018-07-02 PROCEDURE — 84100 ASSAY OF PHOSPHORUS: CPT

## 2018-07-02 PROCEDURE — 36415 COLL VENOUS BLD VENIPUNCTURE: CPT

## 2018-07-02 PROCEDURE — 82570 ASSAY OF URINE CREATININE: CPT

## 2018-07-02 PROCEDURE — 81001 URINALYSIS AUTO W/SCOPE: CPT

## 2018-09-04 ENCOUNTER — LAB (OUTPATIENT)
Dept: LAB | Facility: HOSPITAL | Age: 73
End: 2018-09-04

## 2018-09-04 DIAGNOSIS — D47.2 MGUS (MONOCLONAL GAMMOPATHY OF UNKNOWN SIGNIFICANCE): ICD-10-CM

## 2018-09-04 DIAGNOSIS — N18.30 ANEMIA OF CHRONIC RENAL FAILURE, STAGE 3 (MODERATE) (HCC): ICD-10-CM

## 2018-09-04 DIAGNOSIS — D63.1 ANEMIA OF CHRONIC RENAL FAILURE, STAGE 3 (MODERATE) (HCC): ICD-10-CM

## 2018-09-04 LAB
ANION GAP SERPL CALCULATED.3IONS-SCNC: 10.8 MMOL/L
BASOPHILS # BLD AUTO: 0.05 10*3/MM3 (ref 0–0.1)
BASOPHILS NFR BLD AUTO: 1 % (ref 0–1.1)
BUN BLD-MCNC: 36 MG/DL (ref 6–20)
BUN/CREAT SERPL: 28.3 (ref 7.3–30)
CALCIUM SPEC-SCNC: 10.2 MG/DL (ref 8.5–10.2)
CHLORIDE SERPL-SCNC: 105 MMOL/L (ref 98–107)
CO2 SERPL-SCNC: 28.2 MMOL/L (ref 22–29)
CREAT BLD-MCNC: 1.27 MG/DL (ref 0.6–1.1)
DEPRECATED RDW RBC AUTO: 45.2 FL (ref 37–49)
EOSINOPHIL # BLD AUTO: 0.11 10*3/MM3 (ref 0–0.36)
EOSINOPHIL NFR BLD AUTO: 2.3 % (ref 1–5)
ERYTHROCYTE [DISTWIDTH] IN BLOOD BY AUTOMATED COUNT: 12.5 % (ref 11.7–14.5)
FERRITIN SERPL-MCNC: 103.7 NG/ML
GFR SERPL CREATININE-BSD FRML MDRD: 41 ML/MIN/1.73
GLUCOSE BLD-MCNC: 147 MG/DL (ref 74–124)
HCT VFR BLD AUTO: 42.9 % (ref 34–45)
HGB BLD-MCNC: 13.9 G/DL (ref 11.5–14.9)
IMM GRANULOCYTES # BLD: 0.02 10*3/MM3 (ref 0–0.03)
IMM GRANULOCYTES NFR BLD: 0.4 % (ref 0–0.5)
IRON 24H UR-MRATE: 133 MCG/DL (ref 37–145)
IRON SATN MFR SERPL: 34 % (ref 14–48)
LYMPHOCYTES # BLD AUTO: 1.89 10*3/MM3 (ref 1–3.5)
LYMPHOCYTES NFR BLD AUTO: 39.4 % (ref 20–49)
MCH RBC QN AUTO: 31.8 PG (ref 27–33)
MCHC RBC AUTO-ENTMCNC: 32.4 G/DL (ref 32–35)
MCV RBC AUTO: 98.2 FL (ref 83–97)
MONOCYTES # BLD AUTO: 0.3 10*3/MM3 (ref 0.25–0.8)
MONOCYTES NFR BLD AUTO: 6.3 % (ref 4–12)
NEUTROPHILS # BLD AUTO: 2.43 10*3/MM3 (ref 1.5–7)
NEUTROPHILS NFR BLD AUTO: 50.6 % (ref 39–75)
NRBC BLD MANUAL-RTO: 0 /100 WBC (ref 0–0)
PLATELET # BLD AUTO: 137 10*3/MM3 (ref 150–375)
PMV BLD AUTO: 11.8 FL (ref 8.9–12.1)
POTASSIUM BLD-SCNC: 4.8 MMOL/L (ref 3.5–4.7)
RBC # BLD AUTO: 4.37 10*6/MM3 (ref 3.9–5)
SODIUM BLD-SCNC: 144 MMOL/L (ref 134–145)
TIBC SERPL-MCNC: 386 MCG/DL (ref 249–505)
TRANSFERRIN SERPL-MCNC: 276 MG/DL (ref 200–360)
WBC NRBC COR # BLD: 4.8 10*3/MM3 (ref 4–10)

## 2018-09-04 PROCEDURE — 84466 ASSAY OF TRANSFERRIN: CPT | Performed by: INTERNAL MEDICINE

## 2018-09-04 PROCEDURE — 80048 BASIC METABOLIC PNL TOTAL CA: CPT | Performed by: INTERNAL MEDICINE

## 2018-09-04 PROCEDURE — 36415 COLL VENOUS BLD VENIPUNCTURE: CPT | Performed by: INTERNAL MEDICINE

## 2018-09-04 PROCEDURE — 85025 COMPLETE CBC W/AUTO DIFF WBC: CPT | Performed by: INTERNAL MEDICINE

## 2018-09-04 PROCEDURE — 83540 ASSAY OF IRON: CPT | Performed by: INTERNAL MEDICINE

## 2018-09-04 PROCEDURE — 82728 ASSAY OF FERRITIN: CPT | Performed by: INTERNAL MEDICINE

## 2018-09-05 LAB
ALBUMIN SERPL-MCNC: 3.7 G/DL (ref 2.9–4.4)
ALBUMIN/GLOB SERPL: 1.3 {RATIO} (ref 0.7–1.7)
ALPHA1 GLOB FLD ELPH-MCNC: 0.2 G/DL (ref 0–0.4)
ALPHA2 GLOB SERPL ELPH-MCNC: 0.5 G/DL (ref 0.4–1)
B-GLOBULIN SERPL ELPH-MCNC: 1 G/DL (ref 0.7–1.3)
GAMMA GLOB SERPL ELPH-MCNC: 1 G/DL (ref 0.4–1.8)
GLOBULIN SER CALC-MCNC: 2.8 G/DL (ref 2.2–3.9)
IGA SERPL-MCNC: 148 MG/DL (ref 64–422)
IGG SERPL-MCNC: 891 MG/DL (ref 700–1600)
IGM SERPL-MCNC: 274 MG/DL (ref 26–217)
Lab: NORMAL
M-SPIKE: NORMAL G/DL
PROT PATTERN SERPL IFE-IMP: ABNORMAL
PROT SERPL-MCNC: 6.5 G/DL (ref 6–8.5)

## 2018-09-17 ENCOUNTER — APPOINTMENT (OUTPATIENT)
Dept: LAB | Facility: HOSPITAL | Age: 73
End: 2018-09-17

## 2018-09-17 ENCOUNTER — OFFICE VISIT (OUTPATIENT)
Dept: ONCOLOGY | Facility: CLINIC | Age: 73
End: 2018-09-17

## 2018-09-17 VITALS
BODY MASS INDEX: 28.88 KG/M2 | RESPIRATION RATE: 16 BRPM | TEMPERATURE: 97.8 F | HEART RATE: 64 BPM | WEIGHT: 195 LBS | OXYGEN SATURATION: 98 % | HEIGHT: 69 IN | DIASTOLIC BLOOD PRESSURE: 70 MMHG | SYSTOLIC BLOOD PRESSURE: 110 MMHG

## 2018-09-17 DIAGNOSIS — D47.2 MGUS (MONOCLONAL GAMMOPATHY OF UNKNOWN SIGNIFICANCE): Primary | ICD-10-CM

## 2018-09-17 PROCEDURE — G0463 HOSPITAL OUTPT CLINIC VISIT: HCPCS | Performed by: INTERNAL MEDICINE

## 2018-09-17 PROCEDURE — 99214 OFFICE O/P EST MOD 30 MIN: CPT | Performed by: INTERNAL MEDICINE

## 2018-09-17 NOTE — PROGRESS NOTES
Subjective .     REASONS FOR FOLLOWUP:  MGUS, intermittent anemia and thrombocytopenia, questionable skull lesion    HISTORY OF PRESENT ILLNESS:  The patient is a 72 y.o. year old female  who is here for follow-up with the above-mentioned history.    No new problems since last visit.  Denies fevers, chills, weight loss, night sweats.  Denies pain.    Past Medical History:   Diagnosis Date   • Anemia    • Arthritis    • Cataract    • CHF (congestive heart failure) (CMS/[facility])    • Chronic kidney disease    • Coronary artery disease    • Diabetes mellitus (CMS/[facility])    • Hypertension    • Implantable cardioverter-defibrillator discharge    • Osteopenia    • Shortness of breath    • Sleep apnea      Past Surgical History:   Procedure Laterality Date   • CARDIAC DEFIBRILLATOR PLACEMENT     • CARDIAC DEFIBRILLATOR PLACEMENT     • CORONARY ANGIOPLASTY WITH STENT PLACEMENT  2010   • ENDOSCOPY AND COLONOSCOPY N/A 3/8/2016    Procedure: ESOPHAGOGASTRODUODENOSCOPY AND COLONOSCOPY;  Surgeon: Dwight Monae MD;  Location: [facility] ENDOSCOPY;  Service:    • EYE SURGERY     • JOINT REPLACEMENT     • KNEE ARTHROSCOPY Bilateral 2009 2007   • TOTAL KNEE ARTHROPLASTY Right 2/14/2017    Procedure: TOTAL KNEE ARTHROPLASTY WITH MARGE NAVIGATION;  Surgeon: Andrew Monae MD;  Location: [facility] MAIN OR;  Service:        HEMATOLOGIC/ONCOLOGIC HISTORY:  (History from previous dates can be found in the separate document.)    MEDICATIONS    Current Outpatient Prescriptions:   •  aspirin 81 MG tablet, Take 162 mg by mouth Daily., Disp: , Rfl:   •  atorvastatin (LIPITOR) 20 MG tablet, Take 20 mg by mouth Every Night., Disp: , Rfl:   •  B-D UF III MINI PEN NEEDLES 31G X 5 MM misc, INJECT 1 EACH INTO THE SKIN DAILY, Disp: , Rfl: 6  •  BD PEN NEEDLE JACOB U/F 32G X 4 MM misc, , Disp: , Rfl:   •  Calcium Carbonate-Vit D-Min (CALCIUM 1200 PO), Take  by mouth., Disp: , Rfl:   •  carvedilol (COREG) 6.25 MG tablet, Take 6.25 mg by mouth 2 (Two) Times  a Day. 2 tablets in the am and 3 in the pm, Disp: , Rfl:   •  ENTRESTO 49-51 MG tablet, TAKE 1 TABLET TWICE A DAY, Disp: 180 tablet, Rfl: 1  •  esomeprazole (NexIUM) 40 MG capsule, Take 40 mg by mouth Every Morning Before Breakfast. TAKE 1 CAPSULE BY MOUTH EVERY MORNING, Disp: , Rfl: 3  •  fenofibrate (TRICOR) 48 MG tablet, Take 48 mg by mouth Daily., Disp: , Rfl:   •  furosemide (LASIX) 40 MG tablet, Take 40 mg by mouth Daily As Needed. 1-2 tablets per day, Disp: , Rfl:   •  glimepiride (AMARYL) 4 MG tablet, , Disp: , Rfl:   •  Insulin Pen Needle (B-D UF III MINI PEN NEEDLES) 31G X 5 MM misc, INJECT 1 EACH INTO THE SKIN DAILY, Disp: , Rfl:   •  Liraglutide 18 MG/3ML solution pen-injector, Inject  under the skin daily., Disp: , Rfl:   •  ONE TOUCH ULTRA TEST test strip, USE TO TEST BLOOD SUGAR FOUR TIMES DAILY (DX: 250.02), Disp: , Rfl: 3  •  ONETOUCH DELICA LANCETS 33G misc, USE TO TEST BLOOD SUGAR FOUR TIMES DAILY (DX:250.02), Disp: , Rfl: 3  •  pramipexole (MIRAPEX) 0.5 MG tablet, Take 0.5 mg by mouth Every Night., Disp: , Rfl:   •  vitamin D (ERGOCALCIFEROL) 69827 UNITS capsule capsule, Take 50,000 Units by mouth 1 (One) Time Per Week., Disp: , Rfl:     ALLERGIES:     Allergies   Allergen Reactions   • Amoxicillin Nausea And Vomiting     Patient states that she does not have an issue with this anymore.       SOCIAL HISTORY:       Social History     Social History   • Marital status:      Spouse name: N/A   • Number of children: N/A   • Years of education: College     Occupational History   • Teacher Retired     Social History Main Topics   • Smoking status: Never Smoker   • Smokeless tobacco: Never Used   • Alcohol use No   • Drug use: No   • Sexual activity: Defer     Other Topics Concern   • Not on file     Social History Narrative   • No narrative on file         FAMILY HISTORY:  Family History   Problem Relation Age of Onset   • Diabetes Mother    • Heart disease Mother    • Heart disease Father    •  "Cancer Father 85        Bladder   • Cancer Brother 58        Bladder   • Heart disease Maternal Grandmother    • Heart disease Maternal Grandfather        REVIEW OF SYSTEMS:  Review of Systems     Objective    Vitals:    09/17/18 0849   BP: 110/70   Pulse: 64   Resp: 16   Temp: 97.8 °F (36.6 °C)   SpO2: 98%   Weight: 88.5 kg (195 lb)   Height: 176 cm (69.29\")  Comment: new ht w/o shoes   PainSc: 0-No pain     Current Status 9/17/2018   ECOG score 0      PHYSICAL EXAM:    CONSTITUTIONAL:  Vital signs reviewed.  No distress, looks comfortable.  EYES:  Conjunctiva and lids unremarkable.  PERRLA  EARS,NOSE,MOUTH,THROAT:  Ears and nose appear unremarkable.  Lips, teeth, gums appear unremarkable.  RESPIRATORY:  Normal respiratory effort.  Lungs clear to auscultation bilaterally.  CARDIOVASCULAR:  Normal S1, S2.  No murmurs rubs or gallops.  No significant lower extremity edema.  GASTROINTESTINAL: Abdomen appears unremarkable.  Nontender.  No hepatomegaly.  No splenomegaly.  LYMPHATIC:  No cervical, supraclavicular, axillary lymphadenopathy.  SKIN:  Warm.  No rashes.  PSYCHIATRIC:  Normal judgment and insight.  Normal mood and affect.      RECENT LABS:        WBC   Date/Time Value Ref Range Status   09/04/2018 09:20 AM 4.80 4.00 - 10.00 10*3/mm3 Final     Hemoglobin   Date/Time Value Ref Range Status   09/04/2018 09:20 AM 13.9 11.5 - 14.9 g/dL Final     Platelets   Date/Time Value Ref Range Status   09/04/2018 09:20  (L) 150 - 375 10*3/mm3 Final       Assessment/Plan     1.  IgM lambda monoclonal gammopathy of unknown significance. CAT scans showed no evidence of an underlying lymphoma on 08/03/2012.   Labs 9/4/18: No clear evidence of M spike.    2.  Intermittent anemia and intermittent thrombocytopenia. History of borderline ferritin of 43. ferrous sulfate twice per day since 08/10/2012.  GI evaluation by Dr. Monae in the past. Hemoccult cards negative x 3 on /11/20/2012.   Recent iron studies are normal.   Recent " Hb normal.  PLT mildly low.      3.  Questionable 7 mm L4 lesion seen on CAT scan on 8/3/12. This was not seen on metastatic bone survey. Plan followup The L4 lesion initially seen on 8/3/12 was unchanged with central fat density and was felt by the radiologist to be benign on followup CAT scan on 1/15/13. Therefore, plan no further followup CAT scans.     4.  Chronic renal insufficiency. She follows with Dr. Teresa Rosales.   Creatinine stable.    5.  Questionable small lytic lesion in the skull, first seen on bone survey on 07/08/2013. CAT scan of the head 07/ 30/2013 and 10/23/2013 show no change and offered no additional information. She did have some venous lakes in her skull. 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   Unchanged on bone survey 04/30/2015 and bone survey 8/28/17.    6.  Possible lytic lesion right acetabulum on frontal view of the pelvis on bone survey 8/28/17.  However, not seen on proximal right femur frontal view.  Radiologist felt this likely represented overlying bowel, but stated a follow-up x-ray of the pelvis could be obtained for confirmation.  Patient declined this but agreed to repeat a bone survey in one year.  She did not understand she was supposed to have the bone survey done before this visit.  However, with labs looking unremarkable, she would like to wait until next year to have this which I think is reasonable.        PLAN:   1.  M.D. visit in one year with serum protein studies and bone survey 2 weeks prior.   2.  (We have been doing bone surveys roughly every 2 years).  3. (Plan no more CT scans of the head to try and reduce radiation exposure. This was discussed with the patient on the 10/30/2013 visit and she agreed).

## 2019-06-19 ENCOUNTER — LAB (OUTPATIENT)
Dept: LAB | Facility: HOSPITAL | Age: 74
End: 2019-06-19

## 2019-06-19 ENCOUNTER — TRANSCRIBE ORDERS (OUTPATIENT)
Dept: ADMINISTRATIVE | Facility: HOSPITAL | Age: 74
End: 2019-06-19

## 2019-06-19 DIAGNOSIS — E55.9 VITAMIN D DEFICIENCY: ICD-10-CM

## 2019-06-19 DIAGNOSIS — N18.2 CHRONIC KIDNEY DISEASE, STAGE II (MILD): ICD-10-CM

## 2019-06-19 DIAGNOSIS — I12.9 HYPERTENSIVE NEPHROPATHY: ICD-10-CM

## 2019-06-19 DIAGNOSIS — N18.2 CHRONIC KIDNEY DISEASE, STAGE II (MILD): Primary | ICD-10-CM

## 2019-06-19 LAB
25(OH)D3 SERPL-MCNC: 44 NG/ML (ref 30–100)
ANION GAP SERPL CALCULATED.3IONS-SCNC: 12.4 MMOL/L
BACTERIA UR QL AUTO: ABNORMAL /HPF
BASOPHILS # BLD AUTO: 0.03 10*3/MM3 (ref 0–0.2)
BASOPHILS NFR BLD AUTO: 0.6 % (ref 0–1.5)
BILIRUB UR QL STRIP: NEGATIVE
BUN BLD-MCNC: 18 MG/DL (ref 8–23)
BUN/CREAT SERPL: 16.2 (ref 7–25)
CALCIUM SPEC-SCNC: 9.3 MG/DL (ref 8.6–10.5)
CALCIUM SPEC-SCNC: 9.3 MG/DL (ref 8.6–10.5)
CHLORIDE SERPL-SCNC: 106 MMOL/L (ref 98–107)
CLARITY UR: CLEAR
CO2 SERPL-SCNC: 25.6 MMOL/L (ref 22–29)
COLOR UR: ABNORMAL
CREAT BLD-MCNC: 1.11 MG/DL (ref 0.57–1)
CREAT UR-MCNC: 246.7 MG/DL
DEPRECATED RDW RBC AUTO: 47.2 FL (ref 37–54)
EOSINOPHIL # BLD AUTO: 0.24 10*3/MM3 (ref 0–0.4)
EOSINOPHIL NFR BLD AUTO: 4.5 % (ref 0.3–6.2)
ERYTHROCYTE [DISTWIDTH] IN BLOOD BY AUTOMATED COUNT: 12.8 % (ref 12.3–15.4)
GFR SERPL CREATININE-BSD FRML MDRD: 48 ML/MIN/1.73
GLUCOSE BLD-MCNC: 179 MG/DL (ref 65–99)
GLUCOSE UR STRIP-MCNC: NEGATIVE MG/DL
HCT VFR BLD AUTO: 41.2 % (ref 34–46.6)
HGB BLD-MCNC: 12.7 G/DL (ref 12–15.9)
HGB UR QL STRIP.AUTO: NEGATIVE
HYALINE CASTS UR QL AUTO: ABNORMAL /LPF
IMM GRANULOCYTES # BLD AUTO: 0.02 10*3/MM3 (ref 0–0.05)
IMM GRANULOCYTES NFR BLD AUTO: 0.4 % (ref 0–0.5)
KETONES UR QL STRIP: NEGATIVE
LEUKOCYTE ESTERASE UR QL STRIP.AUTO: ABNORMAL
LYMPHOCYTES # BLD AUTO: 1.34 10*3/MM3 (ref 0.7–3.1)
LYMPHOCYTES NFR BLD AUTO: 25.3 % (ref 19.6–45.3)
MAGNESIUM SERPL-MCNC: 2.2 MG/DL (ref 1.6–2.4)
MCH RBC QN AUTO: 31.1 PG (ref 26.6–33)
MCHC RBC AUTO-ENTMCNC: 30.8 G/DL (ref 31.5–35.7)
MCV RBC AUTO: 101 FL (ref 79–97)
MONOCYTES # BLD AUTO: 0.23 10*3/MM3 (ref 0.1–0.9)
MONOCYTES NFR BLD AUTO: 4.3 % (ref 5–12)
NEUTROPHILS # BLD AUTO: 3.44 10*3/MM3 (ref 1.7–7)
NEUTROPHILS NFR BLD AUTO: 64.9 % (ref 42.7–76)
NITRITE UR QL STRIP: NEGATIVE
NRBC BLD AUTO-RTO: 0 /100 WBC (ref 0–0.2)
PH UR STRIP.AUTO: <=5 [PH] (ref 5–8)
PHOSPHATE SERPL-MCNC: 2.6 MG/DL (ref 2.5–4.5)
PLATELET # BLD AUTO: 194 10*3/MM3 (ref 140–450)
PMV BLD AUTO: 12.2 FL (ref 6–12)
POTASSIUM BLD-SCNC: 4.6 MMOL/L (ref 3.5–5.2)
PROT UR QL STRIP: ABNORMAL
PROT UR-MCNC: 37 MG/DL
PROT/CREAT UR: 150 MG/G CREA (ref 0–200)
PTH-INTACT SERPL-MCNC: 36.2 PG/ML (ref 15–65)
RBC # BLD AUTO: 4.08 10*6/MM3 (ref 3.77–5.28)
RBC # UR: ABNORMAL /HPF
RBC CASTS #/AREA URNS LPF: ABNORMAL /LPF
REF LAB TEST METHOD: ABNORMAL
SODIUM BLD-SCNC: 144 MMOL/L (ref 136–145)
SP GR UR STRIP: 1.03 (ref 1–1.03)
SQUAMOUS #/AREA URNS HPF: ABNORMAL /HPF
UROBILINOGEN UR QL STRIP: ABNORMAL
WBC NRBC COR # BLD: 5.3 10*3/MM3 (ref 3.4–10.8)
WBC UR QL AUTO: ABNORMAL /HPF

## 2019-06-19 PROCEDURE — 82570 ASSAY OF URINE CREATININE: CPT

## 2019-06-19 PROCEDURE — 81001 URINALYSIS AUTO W/SCOPE: CPT

## 2019-06-19 PROCEDURE — 80048 BASIC METABOLIC PNL TOTAL CA: CPT

## 2019-06-19 PROCEDURE — 84156 ASSAY OF PROTEIN URINE: CPT

## 2019-06-19 PROCEDURE — 82306 VITAMIN D 25 HYDROXY: CPT

## 2019-06-19 PROCEDURE — 83735 ASSAY OF MAGNESIUM: CPT

## 2019-06-19 PROCEDURE — 83970 ASSAY OF PARATHORMONE: CPT

## 2019-06-19 PROCEDURE — 36415 COLL VENOUS BLD VENIPUNCTURE: CPT

## 2019-06-19 PROCEDURE — 84100 ASSAY OF PHOSPHORUS: CPT

## 2019-06-19 PROCEDURE — 85025 COMPLETE CBC W/AUTO DIFF WBC: CPT

## 2019-10-16 ENCOUNTER — LAB (OUTPATIENT)
Dept: LAB | Facility: HOSPITAL | Age: 74
End: 2019-10-16

## 2019-10-16 DIAGNOSIS — D47.2 MGUS (MONOCLONAL GAMMOPATHY OF UNKNOWN SIGNIFICANCE): ICD-10-CM

## 2019-10-16 DIAGNOSIS — N18.2 CHRONIC KIDNEY DISEASE, STAGE II (MILD): Primary | ICD-10-CM

## 2019-10-16 DIAGNOSIS — E55.9 VITAMIN D DEFICIENCY: ICD-10-CM

## 2019-10-16 LAB
ANION GAP SERPL CALCULATED.3IONS-SCNC: 11.8 MMOL/L (ref 5–15)
BASOPHILS # BLD AUTO: 0.03 10*3/MM3 (ref 0–0.2)
BASOPHILS NFR BLD AUTO: 0.6 % (ref 0–1.5)
BUN BLD-MCNC: 18 MG/DL (ref 6–20)
BUN/CREAT SERPL: 16.7 (ref 7.3–30)
CALCIUM SPEC-SCNC: 9.7 MG/DL (ref 8.5–10.2)
CHLORIDE SERPL-SCNC: 106 MMOL/L (ref 98–107)
CO2 SERPL-SCNC: 25.2 MMOL/L (ref 22–29)
CREAT BLD-MCNC: 1.08 MG/DL (ref 0.6–1.1)
DEPRECATED RDW RBC AUTO: 44.5 FL (ref 37–54)
EOSINOPHIL # BLD AUTO: 0.11 10*3/MM3 (ref 0–0.4)
EOSINOPHIL NFR BLD AUTO: 2.3 % (ref 0.3–6.2)
ERYTHROCYTE [DISTWIDTH] IN BLOOD BY AUTOMATED COUNT: 12.3 % (ref 12.3–15.4)
GFR SERPL CREATININE-BSD FRML MDRD: 50 ML/MIN/1.73
GLUCOSE BLD-MCNC: 110 MG/DL (ref 74–124)
HCT VFR BLD AUTO: 41.5 % (ref 34–46.6)
HGB BLD-MCNC: 13.2 G/DL (ref 12–15.9)
IMM GRANULOCYTES # BLD AUTO: 0.01 10*3/MM3 (ref 0–0.05)
IMM GRANULOCYTES NFR BLD AUTO: 0.2 % (ref 0–0.5)
LYMPHOCYTES # BLD AUTO: 1.58 10*3/MM3 (ref 0.7–3.1)
LYMPHOCYTES NFR BLD AUTO: 32.4 % (ref 19.6–45.3)
MCH RBC QN AUTO: 31.3 PG (ref 26.6–33)
MCHC RBC AUTO-ENTMCNC: 31.8 G/DL (ref 31.5–35.7)
MCV RBC AUTO: 98.3 FL (ref 79–97)
MONOCYTES # BLD AUTO: 0.28 10*3/MM3 (ref 0.1–0.9)
MONOCYTES NFR BLD AUTO: 5.7 % (ref 5–12)
NEUTROPHILS # BLD AUTO: 2.87 10*3/MM3 (ref 1.7–7)
NEUTROPHILS NFR BLD AUTO: 58.8 % (ref 42.7–76)
NRBC BLD AUTO-RTO: 0 /100 WBC (ref 0–0.2)
PLATELET # BLD AUTO: 145 10*3/MM3 (ref 140–450)
PMV BLD AUTO: 11.7 FL (ref 6–12)
POTASSIUM BLD-SCNC: 4.5 MMOL/L (ref 3.5–4.7)
RBC # BLD AUTO: 4.22 10*6/MM3 (ref 3.77–5.28)
SODIUM BLD-SCNC: 143 MMOL/L (ref 134–145)
WBC NRBC COR # BLD: 4.88 10*3/MM3 (ref 3.4–10.8)

## 2019-10-16 PROCEDURE — 80048 BASIC METABOLIC PNL TOTAL CA: CPT

## 2019-10-16 PROCEDURE — 85025 COMPLETE CBC W/AUTO DIFF WBC: CPT

## 2019-10-16 PROCEDURE — 36415 COLL VENOUS BLD VENIPUNCTURE: CPT

## 2019-10-17 ENCOUNTER — HOSPITAL ENCOUNTER (OUTPATIENT)
Dept: GENERAL RADIOLOGY | Facility: HOSPITAL | Age: 74
Discharge: HOME | End: 2019-10-17
Admitting: INTERNAL MEDICINE

## 2019-10-17 DIAGNOSIS — D47.2 MGUS (MONOCLONAL GAMMOPATHY OF UNKNOWN SIGNIFICANCE): ICD-10-CM

## 2019-10-17 LAB
ALBUMIN SERPL-MCNC: 3.2 G/DL (ref 2.9–4.4)
ALBUMIN/GLOB SERPL: 1.1 {RATIO} (ref 0.7–1.7)
ALPHA1 GLOB FLD ELPH-MCNC: 0.3 G/DL (ref 0–0.4)
ALPHA2 GLOB SERPL ELPH-MCNC: 0.8 G/DL (ref 0.4–1)
B-GLOBULIN SERPL ELPH-MCNC: 1 G/DL (ref 0.7–1.3)
GAMMA GLOB SERPL ELPH-MCNC: 1.1 G/DL (ref 0.4–1.8)
GLOBULIN SER CALC-MCNC: 3.2 G/DL (ref 2.2–3.9)
IGA SERPL-MCNC: 171 MG/DL (ref 64–422)
IGG SERPL-MCNC: 852 MG/DL (ref 700–1600)
IGM SERPL-MCNC: 304 MG/DL (ref 26–217)
INTERPRETATION SERPL IEP-IMP: ABNORMAL
KAPPA LC SERPL-MCNC: 35.5 MG/L (ref 3.3–19.4)
KAPPA LC/LAMBDA SER: 0.9 {RATIO} (ref 0.26–1.65)
LAMBDA LC FREE SERPL-MCNC: 39.4 MG/L (ref 5.7–26.3)
Lab: ABNORMAL
M-SPIKE: ABNORMAL G/DL
PROT SERPL-MCNC: 6.4 G/DL (ref 6–8.5)

## 2019-10-17 PROCEDURE — 77075 RADEX OSSEOUS SURVEY COMPL: CPT

## 2019-10-28 ENCOUNTER — APPOINTMENT (OUTPATIENT)
Dept: LAB | Facility: HOSPITAL | Age: 74
End: 2019-10-28

## 2019-10-28 ENCOUNTER — OFFICE VISIT (OUTPATIENT)
Dept: ONCOLOGY | Facility: CLINIC | Age: 74
End: 2019-10-28

## 2019-10-28 VITALS
BODY MASS INDEX: 28.97 KG/M2 | WEIGHT: 195.6 LBS | RESPIRATION RATE: 16 BRPM | OXYGEN SATURATION: 99 % | HEART RATE: 69 BPM | SYSTOLIC BLOOD PRESSURE: 118 MMHG | TEMPERATURE: 98.3 F | DIASTOLIC BLOOD PRESSURE: 77 MMHG | HEIGHT: 69 IN

## 2019-10-28 DIAGNOSIS — D47.2 MGUS (MONOCLONAL GAMMOPATHY OF UNKNOWN SIGNIFICANCE): Primary | ICD-10-CM

## 2019-10-28 PROCEDURE — 99214 OFFICE O/P EST MOD 30 MIN: CPT | Performed by: INTERNAL MEDICINE

## 2019-10-28 PROCEDURE — G0463 HOSPITAL OUTPT CLINIC VISIT: HCPCS | Performed by: INTERNAL MEDICINE

## 2019-10-28 NOTE — PROGRESS NOTES
Subjective .     REASONS FOR FOLLOWUP:  MGUS, intermittent anemia and thrombocytopenia, questionable skull lesion    HISTORY OF PRESENT ILLNESS:  The patient is a 73 y.o. year old female  who is here for follow-up with the above-mentioned history.    Nothing new since last visit.  Denies fever, chills, weight loss, night sweats, pain.  Continues to do yard work.    Past Medical History:   Diagnosis Date   • Anemia    • Arthritis    • Atrial fibrillation (CMS/HCC)    • Cataract    • CHF (congestive heart failure) (CMS/HCC)    • Chronic kidney disease    • Coronary artery disease    • Diabetes mellitus (CMS/HCC)    • GERD (gastroesophageal reflux disease)    • Goiter    • History of cardiomyopathy    • Hyperlipidemia    • Hypertension    • Implantable cardioverter-defibrillator discharge    • Left bundle branch block    • MGUS (monoclonal gammopathy of unknown significance)    • Osteopenia    • Restless leg    • Shortness of breath    • Sleep apnea      Past Surgical History:   Procedure Laterality Date   • CARDIAC CATHETERIZATION  2005   • CARDIAC DEFIBRILLATOR PLACEMENT  2011   • CARDIAC DEFIBRILLATOR PLACEMENT     • CATARACT EXTRACTION     • CORONARY ANGIOPLASTY WITH STENT PLACEMENT  2010   • ENDOSCOPY AND COLONOSCOPY N/A 3/8/2016    Procedure: ESOPHAGOGASTRODUODENOSCOPY AND COLONOSCOPY;  Surgeon: Dwight Monae MD;  Location: [facility] ENDOSCOPY;  Service:    • EYE SURGERY     • JOINT REPLACEMENT     • KNEE ARTHROSCOPY Bilateral 2009 2007   • TOTAL KNEE ARTHROPLASTY Right 2/14/2017    Procedure: TOTAL KNEE ARTHROPLASTY WITH MARGE NAVIGATION;  Surgeon: Andrew Monae MD;  Location: [facility] OR;  Service:        HEMATOLOGIC/ONCOLOGIC HISTORY:  (History from previous dates can be found in the separate document.)    MEDICATIONS    Current Outpatient Medications:   •  aspirin 81 MG tablet, Take 162 mg by mouth Daily., Disp: , Rfl:   •  atorvastatin (LIPITOR) 20 MG tablet, Take 20 mg by mouth Every Night.,  Disp: , Rfl:   •  B-D UF III MINI PEN NEEDLES 31G X 5 MM misc, INJECT 1 EACH INTO THE SKIN DAILY, Disp: , Rfl: 6  •  BD PEN NEEDLE JACOB U/F 32G X 4 MM misc, , Disp: , Rfl:   •  Calcium Carbonate-Vit D-Min (CALCIUM 1200 PO), Take  by mouth., Disp: , Rfl:   •  carvedilol (COREG) 6.25 MG tablet, Take 6.25 mg by mouth 2 (Two) Times a Day. 2 tablets in the am and 3 in the pm, Disp: , Rfl:   •  ENTRESTO 49-51 MG tablet, TAKE 1 TABLET TWICE A DAY, Disp: 180 tablet, Rfl: 1  •  esomeprazole (NexIUM) 40 MG capsule, Take 40 mg by mouth Every Morning Before Breakfast. TAKE 1 CAPSULE BY MOUTH EVERY MORNING, Disp: , Rfl: 3  •  fenofibrate (TRICOR) 48 MG tablet, Take 48 mg by mouth Daily., Disp: , Rfl:   •  furosemide (LASIX) 40 MG tablet, Take 40 mg by mouth Daily As Needed. 1-2 tablets per day, Disp: , Rfl:   •  glimepiride (AMARYL) 4 MG tablet, , Disp: , Rfl:   •  Insulin Pen Needle (B-D UF III MINI PEN NEEDLES) 31G X 5 MM misc, INJECT 1 EACH INTO THE SKIN DAILY, Disp: , Rfl:   •  Liraglutide 18 MG/3ML solution pen-injector, Inject  under the skin daily., Disp: , Rfl:   •  ONE TOUCH ULTRA TEST test strip, USE TO TEST BLOOD SUGAR FOUR TIMES DAILY (DX: 250.02), Disp: , Rfl: 3  •  ONETOUCH DELICA LANCETS 33G misc, USE TO TEST BLOOD SUGAR FOUR TIMES DAILY (DX:250.02), Disp: , Rfl: 3  •  pramipexole (MIRAPEX) 0.5 MG tablet, Take 0.5 mg by mouth Every Night., Disp: , Rfl:   •  vitamin D (ERGOCALCIFEROL) 50435 UNITS capsule capsule, Take 50,000 Units by mouth 1 (One) Time Per Week., Disp: , Rfl:     ALLERGIES:     Allergies   Allergen Reactions   • Amoxicillin Nausea And Vomiting     Patient states that she does not have an issue with this anymore.       SOCIAL HISTORY:       Social History     Socioeconomic History   • Marital status:      Spouse name: Not on file   • Number of children: Not on file   • Years of education: College   • Highest education level: Not on file   Occupational History   • Occupation: Teacher      "Employer: RETIRED   Tobacco Use   • Smoking status: Never Smoker   • Smokeless tobacco: Never Used   Substance and Sexual Activity   • Alcohol use: No   • Drug use: No   • Sexual activity: Defer         FAMILY HISTORY:  Family History   Problem Relation Age of Onset   • Diabetes Mother    • Heart disease Mother    • Hypertension Mother    • Heart failure Mother    • Arthritis Mother    • Stroke Mother    • Heart disease Father    • Cancer Father 85        Bladder   • Heart attack Father    • Arthritis Father    • Sleep apnea Father    • Cancer Brother 58        Bladder   • Heart disease Maternal Grandmother    • Heart disease Maternal Grandfather        REVIEW OF SYSTEMS:  Review of Systems   Constitutional: Negative for activity change.   HENT: Negative for nosebleeds and trouble swallowing.    Respiratory: Negative for shortness of breath and wheezing.    Cardiovascular: Negative for chest pain and palpitations.   Gastrointestinal: Negative for constipation, diarrhea and nausea.   Genitourinary: Negative for dysuria and hematuria.   Musculoskeletal: Negative for arthralgias and myalgias.   Skin: Negative for rash and wound.   Neurological: Negative for seizures and syncope.   Hematological: Negative for adenopathy. Does not bruise/bleed easily.   Psychiatric/Behavioral: Negative for confusion.        Objective    Vitals:    10/28/19 1402   BP: 118/77   Pulse: 69   Resp: 16   Temp: 98.3 °F (36.8 °C)   SpO2: 99%   Weight: 88.7 kg (195 lb 9.6 oz)   Height: 175.8 cm (69.21\")   PainSc:   3   PainLoc: Knee  Comment: both     Current Status 10/28/2019   ECOG score 0      PHYSICAL EXAM:    CONSTITUTIONAL:  Vital signs reviewed.  No distress, looks comfortable.  Overweight  EYES:  Conjunctiva and lids unremarkable.  PERRLA  EARS,NOSE,MOUTH,THROAT:  Ears and nose appear unremarkable.  Lips, teeth, gums appear unremarkable.  RESPIRATORY:  Normal respiratory effort.  Lungs clear to auscultation bilaterally.  CARDIOVASCULAR:  " Normal S1, S2.  No murmurs rubs or gallops.  No significant lower extremity edema.  GASTROINTESTINAL: Abdomen appears unremarkable.  Nontender.  No hepatomegaly.  No splenomegaly.  LYMPHATIC:  No cervical, supraclavicular, axillary lymphadenopathy.  SKIN:  Warm.  No rashes.  PSYCHIATRIC:  Normal judgment and insight.  Normal mood and affect.        RECENT LABS:        WBC   Date/Time Value Ref Range Status   10/16/2019 09:11 AM 4.88 3.40 - 10.80 10*3/mm3 Final   10/01/2019 08:51 AM 4.91 4.5 - 11.0 10*3/uL Final     Hemoglobin   Date/Time Value Ref Range Status   10/16/2019 09:11 AM 13.2 12.0 - 15.9 g/dL Final   10/01/2019 08:51 AM 13.5 12.0 - 16.0 g/dL Final     Platelets   Date/Time Value Ref Range Status   10/16/2019 09:11  140 - 450 10*3/mm3 Final   10/01/2019 08:51  140 - 440 10*3/uL Final       Assessment/Plan     *  IgM lambda monoclonal gammopathy of unknown significance. CAT scans showed no evidence of an underlying lymphoma on 08/03/2012.   Labs 9/4/18: No clear evidence of M spike.  Labs 10/16/2019: No evidence of M spike.  K/L ratio normal.    * Intermittent anemia and intermittent thrombocytopenia. History of borderline ferritin of 43. ferrous sulfate twice per day since 08/10/2012.  GI evaluation by Dr. Monae in the past. Hemoccult cards negative x 3 on /11/20/2012.   Recent iron studies are normal.   Recent Hb normal, 13.2.  PLT normal, 145.      *  Questionable 7 mm L4 lesion seen on CAT scan on 8/3/12. This was not seen on metastatic bone survey. Plan followup The L4 lesion initially seen on 8/3/12 was unchanged with central fat density and was felt by the radiologist to be benign on followup CAT scan on 1/15/13. Therefore, plan no further followup CAT scans.     *Chronic renal insufficiency. She follows with Dr. Teresa Rosales.   Creatinine 1.1.    * Questionable small lytic lesion in the skull, first seen on bone survey on 07/08/2013.   · CAT scan of the head 07/ 30/2013 and 10/23/2013  show no change and offered no additional information. She did have some venous lakes in her skull. 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   · Unchanged on bone survey 04/30/2015 and bone survey 8/28/17, and bone survey 10/17/2019  · Therefore, plan no more follow-up of this    *Possible lytic lesion right acetabulum on frontal view of the pelvis on bone survey 8/28/17.  However, not seen on proximal right femur frontal view.  Radiologist felt this likely represented overlying bowel, but stated a follow-up x-ray of the pelvis could be obtained for confirmation.  Patient declined this but agreed to repeat a bone survey.  · Bone survey 10/17/2019: No lytic lesions other than the unchanged lucent area in the skull  · Therefore, plan no more follow-up of this    *Overweight.  Obesity can lead to cytopenias through hepatic steatosis.  She should lose weight.      PLAN:   · M.D. visit in one year with serum protein studies including serum free light chains 2 weeks prior.   · Plan no more bone surveys unless labs significantly change.

## 2019-11-15 ENCOUNTER — OFFICE (OUTPATIENT)
Dept: URBAN - METROPOLITAN AREA CLINIC 65 | Facility: CLINIC | Age: 74
End: 2019-11-15

## 2019-11-15 VITALS
DIASTOLIC BLOOD PRESSURE: 70 MMHG | HEIGHT: 69 IN | SYSTOLIC BLOOD PRESSURE: 112 MMHG | HEART RATE: 74 BPM | WEIGHT: 190 LBS

## 2019-11-15 DIAGNOSIS — Z86.010 PERSONAL HISTORY OF COLONIC POLYPS: ICD-10-CM

## 2019-11-15 DIAGNOSIS — K62.5 HEMORRHAGE OF ANUS AND RECTUM: ICD-10-CM

## 2019-11-15 DIAGNOSIS — R11.2 NAUSEA WITH VOMITING, UNSPECIFIED: ICD-10-CM

## 2019-11-15 DIAGNOSIS — K76.9 LIVER DISEASE, UNSPECIFIED: ICD-10-CM

## 2019-11-15 DIAGNOSIS — K21.9 GASTRO-ESOPHAGEAL REFLUX DISEASE WITHOUT ESOPHAGITIS: ICD-10-CM

## 2019-11-15 PROCEDURE — 99213 OFFICE O/P EST LOW 20 MIN: CPT | Performed by: INTERNAL MEDICINE

## 2020-01-08 ENCOUNTER — HOSPITAL ENCOUNTER (OUTPATIENT)
Facility: HOSPITAL | Age: 75
Setting detail: HOSPITAL OUTPATIENT SURGERY
Discharge: HOME | End: 2020-01-08
Attending: INTERNAL MEDICINE | Admitting: INTERNAL MEDICINE

## 2020-01-08 ENCOUNTER — ANESTHESIA (OUTPATIENT)
Dept: GASTROENTEROLOGY | Facility: HOSPITAL | Age: 75
End: 2020-01-08

## 2020-01-08 ENCOUNTER — ANESTHESIA EVENT (OUTPATIENT)
Dept: GASTROENTEROLOGY | Facility: HOSPITAL | Age: 75
End: 2020-01-08

## 2020-01-08 ENCOUNTER — ON CAMPUS - OUTPATIENT (OUTPATIENT)
Dept: URBAN - METROPOLITAN AREA HOSPITAL 114 | Facility: HOSPITAL | Age: 75
End: 2020-01-08
Payer: MEDICARE

## 2020-01-08 VITALS
RESPIRATION RATE: 16 BRPM | OXYGEN SATURATION: 98 % | SYSTOLIC BLOOD PRESSURE: 110 MMHG | DIASTOLIC BLOOD PRESSURE: 57 MMHG | HEART RATE: 72 BPM | HEIGHT: 69 IN | BODY MASS INDEX: 28.73 KG/M2 | TEMPERATURE: 98 F | WEIGHT: 194 LBS

## 2020-01-08 DIAGNOSIS — K29.50 UNSPECIFIED CHRONIC GASTRITIS WITHOUT BLEEDING: ICD-10-CM

## 2020-01-08 DIAGNOSIS — Z86.010 PERSONAL HISTORY OF COLONIC POLYPS: ICD-10-CM

## 2020-01-08 DIAGNOSIS — K31.819 ANGIODYSPLASIA OF STOMACH AND DUODENUM WITHOUT BLEEDING: ICD-10-CM

## 2020-01-08 DIAGNOSIS — R11.0 NAUSEA: ICD-10-CM

## 2020-01-08 DIAGNOSIS — K22.2 ESOPHAGEAL OBSTRUCTION: ICD-10-CM

## 2020-01-08 DIAGNOSIS — R10.13 DYSPEPSIA: ICD-10-CM

## 2020-01-08 DIAGNOSIS — R10.13 EPIGASTRIC PAIN: ICD-10-CM

## 2020-01-08 DIAGNOSIS — K44.9 DIAPHRAGMATIC HERNIA WITHOUT OBSTRUCTION OR GANGRENE: ICD-10-CM

## 2020-01-08 LAB — GLUCOSE BLDC GLUCOMTR-MCNC: 98 MG/DL (ref 70–130)

## 2020-01-08 PROCEDURE — 82962 GLUCOSE BLOOD TEST: CPT

## 2020-01-08 PROCEDURE — 88305 TISSUE EXAM BY PATHOLOGIST: CPT | Performed by: INTERNAL MEDICINE

## 2020-01-08 PROCEDURE — 43239 EGD BIOPSY SINGLE/MULTIPLE: CPT | Performed by: INTERNAL MEDICINE

## 2020-01-08 PROCEDURE — G0105 COLORECTAL SCRN; HI RISK IND: HCPCS | Performed by: INTERNAL MEDICINE

## 2020-01-08 PROCEDURE — 25010000002 PROPOFOL 10 MG/ML EMULSION: Performed by: ANESTHESIOLOGY

## 2020-01-08 RX ORDER — LIDOCAINE HYDROCHLORIDE 10 MG/ML
0.5 INJECTION, SOLUTION INFILTRATION; PERINEURAL ONCE AS NEEDED
Status: DISCONTINUED | OUTPATIENT
Start: 2020-01-08 | End: 2020-01-08 | Stop reason: HOSPADM

## 2020-01-08 RX ORDER — PROPOFOL 10 MG/ML
VIAL (ML) INTRAVENOUS AS NEEDED
Status: DISCONTINUED | OUTPATIENT
Start: 2020-01-08 | End: 2020-01-08 | Stop reason: SURG

## 2020-01-08 RX ORDER — PROPOFOL 10 MG/ML
VIAL (ML) INTRAVENOUS CONTINUOUS PRN
Status: DISCONTINUED | OUTPATIENT
Start: 2020-01-08 | End: 2020-01-08 | Stop reason: SURG

## 2020-01-08 RX ORDER — SODIUM CHLORIDE, SODIUM LACTATE, POTASSIUM CHLORIDE, CALCIUM CHLORIDE 600; 310; 30; 20 MG/100ML; MG/100ML; MG/100ML; MG/100ML
1000 INJECTION, SOLUTION INTRAVENOUS CONTINUOUS
Status: DISCONTINUED | OUTPATIENT
Start: 2020-01-08 | End: 2020-01-08 | Stop reason: HOSPADM

## 2020-01-08 RX ORDER — SODIUM CHLORIDE 0.9 % (FLUSH) 0.9 %
10 SYRINGE (ML) INJECTION AS NEEDED
Status: DISCONTINUED | OUTPATIENT
Start: 2020-01-08 | End: 2020-01-08 | Stop reason: HOSPADM

## 2020-01-08 RX ORDER — LIDOCAINE HYDROCHLORIDE 20 MG/ML
INJECTION, SOLUTION INFILTRATION; PERINEURAL AS NEEDED
Status: DISCONTINUED | OUTPATIENT
Start: 2020-01-08 | End: 2020-01-08 | Stop reason: SURG

## 2020-01-08 RX ADMIN — LIDOCAINE HYDROCHLORIDE 50 MG: 20 INJECTION, SOLUTION INFILTRATION; PERINEURAL at 10:28

## 2020-01-08 RX ADMIN — PROPOFOL 125 MCG/KG/MIN: 10 INJECTION, EMULSION INTRAVENOUS at 10:32

## 2020-01-08 RX ADMIN — SODIUM CHLORIDE, POTASSIUM CHLORIDE, SODIUM LACTATE AND CALCIUM CHLORIDE 1000 ML: 600; 310; 30; 20 INJECTION, SOLUTION INTRAVENOUS at 10:16

## 2020-01-08 RX ADMIN — PROPOFOL 50 MG: 10 INJECTION, EMULSION INTRAVENOUS at 10:37

## 2020-01-08 RX ADMIN — PROPOFOL 75 MG: 10 INJECTION, EMULSION INTRAVENOUS at 10:31

## 2020-01-08 NOTE — H&P
Patient Care Team:  Greta Humphrey MD as PCP - General  Code, Avery POND II, MD as Consulting Physician (Hematology and Oncology)  Greta Humphrey MD as Referring Physician (Internal Medicine)    Chief complaint gerd, hx of polyps    Subjective     History of Present Illness  Nausea, vomiting at times,  Improved,  Diarrhea better with less caffeine and fiber supplementation   Review of Systems   Gastrointestinal: Positive for nausea.   All other systems reviewed and are negative.       Past Medical History:   Diagnosis Date   • Anemia    • Arthritis    • Atrial fibrillation (CMS/[facility])    • Cataract    • CHF (congestive heart failure) (CMS/[facility])    • Chronic kidney disease    • Coronary artery disease    • Diabetes mellitus (CMS/[facility])    • GERD (gastroesophageal reflux disease)    • Goiter    • History of cardiomyopathy    • Hyperlipidemia    • Hypertension    • Implantable cardioverter-defibrillator discharge    • Left bundle branch block    • MGUS (monoclonal gammopathy of unknown significance)    • Osteopenia    • Restless leg    • Shortness of breath    • Sleep apnea      Past Surgical History:   Procedure Laterality Date   • CARDIAC CATHETERIZATION  2005   • CARDIAC DEFIBRILLATOR PLACEMENT  2011   • CARDIAC DEFIBRILLATOR PLACEMENT     • CATARACT EXTRACTION     • COLONOSCOPY     • CORONARY ANGIOPLASTY WITH STENT PLACEMENT  2010   • ENDOSCOPY     • ENDOSCOPY AND COLONOSCOPY N/A 3/8/2016    Procedure: ESOPHAGOGASTRODUODENOSCOPY AND COLONOSCOPY;  Surgeon: Dwight Monae MD;  Location: [facility] ENDOSCOPY;  Service:    • EYE SURGERY     • JOINT REPLACEMENT     • KNEE ARTHROSCOPY Bilateral 2009 2007   • TOTAL KNEE ARTHROPLASTY Right 2/14/2017    Procedure: TOTAL KNEE ARTHROPLASTY WITH MARGE NAVIGATION;  Surgeon: Andrew Monae MD;  Location: [facility] OR;  Service:      Family History   Problem Relation Age of Onset   • Diabetes Mother    • Heart disease Mother    • Hypertension Mother    • Heart failure  Mother    • Arthritis Mother    • Stroke Mother    • Heart disease Father    • Cancer Father 85        Bladder   • Heart attack Father    • Arthritis Father    • Sleep apnea Father    • Cancer Brother 58        Bladder   • Heart disease Maternal Grandmother    • Heart disease Maternal Grandfather      Social History     Tobacco Use   • Smoking status: Never Smoker   • Smokeless tobacco: Never Used   Substance Use Topics   • Alcohol use: No     Comment: rare   • Drug use: No     Medications Prior to Admission   Medication Sig Dispense Refill Last Dose   • atorvastatin (LIPITOR) 20 MG tablet Take 20 mg by mouth Every Night.   1/7/2020 at Unknown time   • B-D UF III MINI PEN NEEDLES 31G X 5 MM misc INJECT 1 EACH INTO THE SKIN DAILY  6 1/7/2020 at Unknown time   • BD PEN NEEDLE JACOB U/F 32G X 4 MM misc    1/7/2020 at Unknown time   • carvedilol (COREG) 6.25 MG tablet Take 6.25 mg by mouth 2 (Two) Times a Day. 2 tablets in the am and 3 in the pm   1/8/2020 at Unknown time   • ENTRESTO 49-51 MG tablet TAKE 1 TABLET TWICE A  tablet 1 1/8/2020 at Unknown time   • esomeprazole (NexIUM) 40 MG capsule Take 40 mg by mouth Every Morning Before Breakfast. TAKE 1 CAPSULE BY MOUTH EVERY MORNING  3 1/7/2020 at Unknown time   • fenofibrate (TRICOR) 48 MG tablet Take 48 mg by mouth Daily.   1/7/2020 at Unknown time   • furosemide (LASIX) 40 MG tablet Take 40 mg by mouth Daily As Needed. 1-2 tablets per day   Past Month at Unknown time   • glimepiride (AMARYL) 4 MG tablet    1/7/2020 at Unknown time   • Insulin Pen Needle (B-D UF III MINI PEN NEEDLES) 31G X 5 MM misc INJECT 1 EACH INTO THE SKIN DAILY   1/7/2020 at Unknown time   • Liraglutide 18 MG/3ML solution pen-injector Inject  under the skin daily.   1/7/2020 at Unknown time   • ONE TOUCH ULTRA TEST test strip USE TO TEST BLOOD SUGAR FOUR TIMES DAILY (DX: 250.02)  3 1/7/2020 at Unknown time   • ONETOUCH DELICA LANCETS 33G misc USE TO TEST BLOOD SUGAR FOUR TIMES DAILY  (DX:250.02)  3 1/7/2020 at Unknown time   • pramipexole (MIRAPEX) 0.5 MG tablet Take 0.5 mg by mouth Every Night.   1/7/2020 at Unknown time   • vitamin D (ERGOCALCIFEROL) 95604 UNITS capsule capsule Take 50,000 Units by mouth 1 (One) Time Per Week.   1/4/2020 at Unknown time   • aspirin 81 MG tablet Take 162 mg by mouth Daily.   1/1/2020   • Calcium Carbonate-Vit D-Min (CALCIUM 1200 PO) Take  by mouth.   More than a month at Unknown time     Allergies:  Amoxicillin    Objective      Vital Signs  Heart Rate:  [57] 57  Resp:  [16] 16  BP: (94)/(51) 94/51    Physical Exam   Constitutional: She appears well-developed and well-nourished.   Cardiovascular: Normal rate and regular rhythm.   Pulmonary/Chest: Effort normal and breath sounds normal.   Abdominal: Soft. Bowel sounds are normal.   Skin: Skin is warm and dry.   Psychiatric: She has a normal mood and affect.       Results Review:   I reviewed the patient's new clinical results.      Assessment/Plan       * No active hospital problems. *      Assessment:  (Gastroesophageal reflux disease  Nausea  Personal history of colon polyps ).     Plan:   (Upper and lower tract endoscopy, risks, alternatives and benefits dicussed  with patient and patient is agreeable to proceed.).       I discussed the patients findings and my recommendations with patient and nursing staff    Dwight Monae MD  01/08/20  10:31 AM

## 2020-01-08 NOTE — NURSING NOTE
Spoke with Pedro at [email or web address]. Ok to use a magnet if cautery is used. Pedro stated that if we use a magnet that she can check it once she gets home since she is able to monitor from home. [employer] can receive her info from there.

## 2020-01-08 NOTE — ANESTHESIA PREPROCEDURE EVALUATION
Anesthesia Evaluation     Patient summary reviewed                Airway   No difficulty expected  Dental      Pulmonary    (+) sleep apnea,   Cardiovascular     Rhythm: regular    (+) hypertension, CAD, dysrhythmias, CHF ,       Neuro/Psych  GI/Hepatic/Renal/Endo    (+)   renal disease, diabetes mellitus,     Musculoskeletal     Abdominal    Substance History      OB/GYN          Other                        Anesthesia Plan    ASA 3     MAC       Anesthetic plan, all risks, benefits, and alternatives have been provided, discussed and informed consent has been obtained with: patient.

## 2020-01-08 NOTE — ANESTHESIA POSTPROCEDURE EVALUATION
Patient: Carolyn Nicolas    Procedure Summary     Date:  01/08/20 Room / Location:   [facility]    Anesthesia Start:  1026 Anesthesia Stop:  1107    Procedures:       COLONOSCOPY to Cecum and TI (N/A )      ESOPHAGOGASTRODUODENOSCOPY with Bx's (N/A Esophagus) Diagnosis:      Surgeon:  Dwight Monae MD Provider:  Richard Rios MD    Anesthesia Type:  MAC ASA Status:  3          Anesthesia Type: MAC    Vitals  Vitals Value Taken Time   /57 1/8/2020 11:22 AM   Temp 36.7 °C (98 °F) 1/8/2020 11:17 AM   Pulse 72 1/8/2020 11:22 AM   Resp 16 1/8/2020 11:22 AM   SpO2 98 % 1/8/2020 11:22 AM           Post Anesthesia Care and Evaluation    Patient location during evaluation: PACU  Patient participation: complete - patient participated  Level of consciousness: awake  Pain score: 1  Pain management: adequate  Airway patency: patent  Anesthetic complications: No anesthetic complications  PONV Status: none  Cardiovascular status: acceptable  Respiratory status: acceptable  Hydration status: acceptable

## 2020-01-09 LAB
CYTO UR: NORMAL
LAB AP CASE REPORT: NORMAL
PATH REPORT.FINAL DX SPEC: NORMAL
PATH REPORT.GROSS SPEC: NORMAL

## 2020-08-21 ENCOUNTER — OFFICE (OUTPATIENT)
Dept: URBAN - METROPOLITAN AREA CLINIC 65 | Facility: CLINIC | Age: 75
End: 2020-08-21
Payer: MEDICARE

## 2020-08-21 VITALS
HEART RATE: 72 BPM | TEMPERATURE: 97.8 F | DIASTOLIC BLOOD PRESSURE: 70 MMHG | WEIGHT: 173 LBS | HEIGHT: 69 IN | SYSTOLIC BLOOD PRESSURE: 118 MMHG

## 2020-08-21 DIAGNOSIS — Z86.010 PERSONAL HISTORY OF COLONIC POLYPS: ICD-10-CM

## 2020-08-21 DIAGNOSIS — K21.9 GASTRO-ESOPHAGEAL REFLUX DISEASE WITHOUT ESOPHAGITIS: ICD-10-CM

## 2020-08-21 DIAGNOSIS — K76.9 LIVER DISEASE, UNSPECIFIED: ICD-10-CM

## 2020-08-21 PROCEDURE — 99213 OFFICE O/P EST LOW 20 MIN: CPT | Performed by: INTERNAL MEDICINE

## 2020-10-14 ENCOUNTER — LAB (OUTPATIENT)
Dept: LAB | Facility: HOSPITAL | Age: 75
End: 2020-10-14

## 2020-10-14 DIAGNOSIS — D47.2 MGUS (MONOCLONAL GAMMOPATHY OF UNKNOWN SIGNIFICANCE): ICD-10-CM

## 2020-10-14 LAB
ANION GAP SERPL CALCULATED.3IONS-SCNC: 8.7 MMOL/L (ref 5–15)
BASOPHILS # BLD AUTO: 0.03 10*3/MM3 (ref 0–0.2)
BASOPHILS NFR BLD AUTO: 0.6 % (ref 0–1.5)
BUN SERPL-MCNC: 28 MG/DL (ref 6–20)
BUN/CREAT SERPL: 27.2 (ref 7.3–30)
CALCIUM SPEC-SCNC: 9.8 MG/DL (ref 8.5–10.2)
CHLORIDE SERPL-SCNC: 105 MMOL/L (ref 98–107)
CO2 SERPL-SCNC: 26.3 MMOL/L (ref 22–29)
CREAT SERPL-MCNC: 1.03 MG/DL (ref 0.6–1.1)
DEPRECATED RDW RBC AUTO: 45.7 FL (ref 37–54)
EOSINOPHIL # BLD AUTO: 0.11 10*3/MM3 (ref 0–0.4)
EOSINOPHIL NFR BLD AUTO: 2.1 % (ref 0.3–6.2)
ERYTHROCYTE [DISTWIDTH] IN BLOOD BY AUTOMATED COUNT: 12.5 % (ref 12.3–15.4)
GFR SERPL CREATININE-BSD FRML MDRD: 53 ML/MIN/1.73
GLUCOSE SERPL-MCNC: 209 MG/DL (ref 74–124)
HCT VFR BLD AUTO: 42.9 % (ref 34–46.6)
HGB BLD-MCNC: 13.6 G/DL (ref 12–15.9)
IMM GRANULOCYTES # BLD AUTO: 0.01 10*3/MM3 (ref 0–0.05)
IMM GRANULOCYTES NFR BLD AUTO: 0.2 % (ref 0–0.5)
LYMPHOCYTES # BLD AUTO: 1.63 10*3/MM3 (ref 0.7–3.1)
LYMPHOCYTES NFR BLD AUTO: 31.5 % (ref 19.6–45.3)
MCH RBC QN AUTO: 31.5 PG (ref 26.6–33)
MCHC RBC AUTO-ENTMCNC: 31.7 G/DL (ref 31.5–35.7)
MCV RBC AUTO: 99.3 FL (ref 79–97)
MONOCYTES # BLD AUTO: 0.25 10*3/MM3 (ref 0.1–0.9)
MONOCYTES NFR BLD AUTO: 4.8 % (ref 5–12)
NEUTROPHILS NFR BLD AUTO: 3.15 10*3/MM3 (ref 1.7–7)
NEUTROPHILS NFR BLD AUTO: 60.8 % (ref 42.7–76)
NRBC BLD AUTO-RTO: 0 /100 WBC (ref 0–0.2)
PLATELET # BLD AUTO: 128 10*3/MM3 (ref 140–450)
PMV BLD AUTO: 11.7 FL (ref 6–12)
POTASSIUM SERPL-SCNC: 4.4 MMOL/L (ref 3.5–4.7)
RBC # BLD AUTO: 4.32 10*6/MM3 (ref 3.77–5.28)
SODIUM SERPL-SCNC: 140 MMOL/L (ref 134–145)
WBC # BLD AUTO: 5.18 10*3/MM3 (ref 3.4–10.8)

## 2020-10-14 PROCEDURE — 80048 BASIC METABOLIC PNL TOTAL CA: CPT

## 2020-10-14 PROCEDURE — 85025 COMPLETE CBC W/AUTO DIFF WBC: CPT

## 2020-10-14 PROCEDURE — 36415 COLL VENOUS BLD VENIPUNCTURE: CPT

## 2020-10-15 LAB
ALBUMIN SERPL ELPH-MCNC: 3.6 G/DL (ref 2.9–4.4)
ALBUMIN/GLOB SERPL: 1.3 {RATIO} (ref 0.7–1.7)
ALPHA1 GLOB SERPL ELPH-MCNC: 0.2 G/DL (ref 0–0.4)
ALPHA2 GLOB SERPL ELPH-MCNC: 0.8 G/DL (ref 0.4–1)
B-GLOBULIN SERPL ELPH-MCNC: 0.9 G/DL (ref 0.7–1.3)
GAMMA GLOB SERPL ELPH-MCNC: 1 G/DL (ref 0.4–1.8)
GLOBULIN SER-MCNC: 2.9 G/DL (ref 2.2–3.9)
IGA SERPL-MCNC: 151 MG/DL (ref 64–422)
IGG SERPL-MCNC: 852 MG/DL (ref 586–1602)
IGM SERPL-MCNC: 303 MG/DL (ref 26–217)
INTERPRETATION SERPL IEP-IMP: ABNORMAL
KAPPA LC FREE SER-MCNC: 28.7 MG/L (ref 3.3–19.4)
KAPPA LC FREE/LAMBDA FREE SER: 0.92 {RATIO} (ref 0.26–1.65)
LABORATORY COMMENT REPORT: ABNORMAL
LAMBDA LC FREE SERPL-MCNC: 31.1 MG/L (ref 5.7–26.3)
M PROTEIN SERPL ELPH-MCNC: ABNORMAL G/DL
PROT SERPL-MCNC: 6.5 G/DL (ref 6–8.5)

## 2020-10-28 ENCOUNTER — APPOINTMENT (OUTPATIENT)
Dept: LAB | Facility: HOSPITAL | Age: 75
End: 2020-10-28

## 2020-10-28 ENCOUNTER — OFFICE VISIT (OUTPATIENT)
Dept: ONCOLOGY | Facility: CLINIC | Age: 75
End: 2020-10-28

## 2020-10-28 VITALS
TEMPERATURE: 97.1 F | RESPIRATION RATE: 16 BRPM | DIASTOLIC BLOOD PRESSURE: 57 MMHG | HEIGHT: 69 IN | HEART RATE: 93 BPM | BODY MASS INDEX: 29.47 KG/M2 | WEIGHT: 199 LBS | SYSTOLIC BLOOD PRESSURE: 126 MMHG | OXYGEN SATURATION: 96 %

## 2020-10-28 DIAGNOSIS — D47.2 MGUS (MONOCLONAL GAMMOPATHY OF UNKNOWN SIGNIFICANCE): Primary | ICD-10-CM

## 2020-10-28 PROCEDURE — 99214 OFFICE O/P EST MOD 30 MIN: CPT | Performed by: INTERNAL MEDICINE

## 2020-10-28 RX ORDER — AMOXICILLIN 500 MG/1
CAPSULE ORAL
COMMUNITY
Start: 2020-09-19

## 2020-10-28 RX ORDER — A/SINGAPORE/GP1908/2015 IVR-180 (AN A/MICHIGAN/45/2015 (H1N1)PDM09-LIKE VIRUS, A/HONG KONG/4801/2014, NYMC X-263B (H3N2) (AN A/HONG KONG/4801/2014-LIKE VIRUS), AND B/BRISBANE/60/2008, WILD TYPE (A B/BRISBANE/60/2008-LIKE VIRUS) 15; 15; 15 UG/.5ML; UG/.5ML; UG/.5ML
INJECTION, SUSPENSION INTRAMUSCULAR
COMMUNITY
Start: 2020-09-01

## 2020-10-28 NOTE — PROGRESS NOTES
Subjective .     REASONS FOR FOLLOWUP:  MGUS, intermittent anemia and thrombocytopenia, questionable skull lesion    HISTORY OF PRESENT ILLNESS:  The patient is a 74 y.o. year old female  who is here for follow-up with the above-mentioned history.    Doing well.  Denies fever, chills, weight loss, night sweats, pain    Past Medical History:   Diagnosis Date   • Anemia    • Arthritis    • Atrial fibrillation (CMS/HCC)    • Cataract    • CHF (congestive heart failure) (CMS/HCC)    • Chronic kidney disease    • Coronary artery disease    • Diabetes mellitus (CMS/HCC)    • GERD (gastroesophageal reflux disease)    • Goiter    • History of cardiomyopathy    • Hyperlipidemia    • Hypertension    • Implantable cardioverter-defibrillator discharge    • Left bundle branch block    • MGUS (monoclonal gammopathy of unknown significance)    • Osteopenia    • Restless leg    • Shortness of breath    • Sleep apnea      Past Surgical History:   Procedure Laterality Date   • CARDIAC CATHETERIZATION  2005   • CARDIAC DEFIBRILLATOR PLACEMENT  2011   • CARDIAC DEFIBRILLATOR PLACEMENT     • CATARACT EXTRACTION     • COLONOSCOPY     • COLONOSCOPY N/A 1/8/2020    Procedure: COLONOSCOPY to Cecum and TI;  Surgeon: Dwight Monae MD;  Location: [facility] ENDOSCOPY;  Service: Gastroenterology   • CORONARY ANGIOPLASTY WITH STENT PLACEMENT  2010   • ENDOSCOPY     • ENDOSCOPY N/A 1/8/2020    Procedure: ESOPHAGOGASTRODUODENOSCOPY with Bx's;  Surgeon: Dwight Monae MD;  Location: [facility] ENDOSCOPY;  Service: Gastroenterology   • ENDOSCOPY AND COLONOSCOPY N/A 3/8/2016    Procedure: ESOPHAGOGASTRODUODENOSCOPY AND COLONOSCOPY;  Surgeon: Dwight Monae MD;  Location: [facility] ENDOSCOPY;  Service:    • EYE SURGERY     • JOINT REPLACEMENT     • KNEE ARTHROSCOPY Bilateral 2009 2007   • TOTAL KNEE ARTHROPLASTY Right 2/14/2017    Procedure: TOTAL KNEE ARTHROPLASTY WITH MARGE NAVIGATION;  Surgeon: Andrew Monae MD;  Location: [facility] OR;   Service:        HEMATOLOGIC/ONCOLOGIC HISTORY:  (History from previous dates can be found in the separate document.)    MEDICATIONS    Current Outpatient Medications:   •  amoxicillin (AMOXIL) 500 MG capsule, TAKE 4 CAPSULES BY MOUTH 1 HR PRIOR TO DENTAL APPT., Disp: , Rfl:   •  aspirin 81 MG tablet, Take 162 mg by mouth Daily., Disp: , Rfl:   •  atorvastatin (LIPITOR) 20 MG tablet, Take 20 mg by mouth Every Night., Disp: , Rfl:   •  B-D UF III MINI PEN NEEDLES 31G X 5 MM misc, INJECT 1 EACH INTO THE SKIN DAILY, Disp: , Rfl: 6  •  BD PEN NEEDLE JACOB U/F 32G X 4 MM misc, , Disp: , Rfl:   •  Calcium Carbonate-Vit D-Min (CALCIUM 1200 PO), Take  by mouth., Disp: , Rfl:   •  carvedilol (COREG) 6.25 MG tablet, Take 6.25 mg by mouth 2 (Two) Times a Day. 2 tablets in the am and 3 in the pm, Disp: , Rfl:   •  ENTRESTO 49-51 MG tablet, TAKE 1 TABLET TWICE A DAY, Disp: 180 tablet, Rfl: 1  •  esomeprazole (NexIUM) 40 MG capsule, Take 40 mg by mouth Every Morning Before Breakfast. TAKE 1 CAPSULE BY MOUTH EVERY MORNING, Disp: , Rfl: 3  •  furosemide (LASIX) 40 MG tablet, Take 40 mg by mouth Daily As Needed. 1-2 tablets per day, Disp: , Rfl:   •  glimepiride (AMARYL) 4 MG tablet, , Disp: , Rfl:   •  Influenza Vac A&B SA Adj Quad (Fluad Quadrivalent) 0.5 ML prefilled syringe injection, PHARMACY ADMINISTERED, Disp: , Rfl:   •  Insulin Pen Needle (B-D UF III MINI PEN NEEDLES) 31G X 5 MM misc, INJECT 1 EACH INTO THE SKIN DAILY, Disp: , Rfl:   •  Liraglutide 18 MG/3ML solution pen-injector, Inject  under the skin daily., Disp: , Rfl:   •  ONE TOUCH ULTRA TEST test strip, USE TO TEST BLOOD SUGAR FOUR TIMES DAILY (DX: 250.02), Disp: , Rfl: 3  •  ONETOUCH DELICA LANCETS 33G misc, USE TO TEST BLOOD SUGAR FOUR TIMES DAILY (DX:250.02), Disp: , Rfl: 3  •  pramipexole (MIRAPEX) 0.5 MG tablet, Take 0.5 mg by mouth Every Night., Disp: , Rfl:   •  fenofibrate (TRICOR) 48 MG tablet, Take 48 mg by mouth Daily., Disp: , Rfl:   •  vitamin D  (ERGOCALCIFEROL) 68045 UNITS capsule capsule, Take 50,000 Units by mouth 1 (One) Time Per Week., Disp: , Rfl:     ALLERGIES:     Allergies   Allergen Reactions   • Amoxicillin Nausea And Vomiting     Patient states that she does not have an issue with this anymore.       SOCIAL HISTORY:       Social History     Socioeconomic History   • Marital status:      Spouse name: Not on file   • Number of children: Not on file   • Years of education: College   • Highest education level: Not on file   Occupational History   • Occupation: Teacher     Employer: RETIRED   Tobacco Use   • Smoking status: Never Smoker   • Smokeless tobacco: Never Used   Substance and Sexual Activity   • Alcohol use: No     Comment: rare   • Drug use: No   • Sexual activity: Defer         FAMILY HISTORY:  Family History   Problem Relation Age of Onset   • Diabetes Mother    • Heart disease Mother    • Hypertension Mother    • Heart failure Mother    • Arthritis Mother    • Stroke Mother    • Heart disease Father    • Cancer Father 85        Bladder   • Heart attack Father    • Arthritis Father    • Sleep apnea Father    • Cancer Brother 58        Bladder   • Heart disease Maternal Grandmother    • Heart disease Maternal Grandfather        REVIEW OF SYSTEMS:  Review of Systems   Constitutional: Negative for activity change.   HENT: Negative for nosebleeds and trouble swallowing.    Respiratory: Negative for shortness of breath and wheezing.    Cardiovascular: Negative for chest pain and palpitations.   Gastrointestinal: Negative for constipation, diarrhea and nausea.   Genitourinary: Negative for dysuria and hematuria.   Musculoskeletal: Negative for arthralgias and myalgias.   Skin: Negative for rash and wound.   Neurological: Negative for seizures and syncope.   Hematological: Negative for adenopathy. Does not bruise/bleed easily.   Psychiatric/Behavioral: Negative for confusion.        Objective    Vitals:    10/28/20 0854   BP: 126/57  "  Pulse: 93   Resp: 16   Temp: 97.1 °F (36.2 °C)   TempSrc: Temporal   SpO2: 96%   Weight: 90.3 kg (199 lb)   Height: 175.3 cm (69.02\")   PainSc: 0-No pain     Current Status 10/28/2020   ECOG score 1      PHYSICAL EXAM:        CONSTITUTIONAL:  Vital signs reviewed.  No distress, looks comfortable.  EYES:  Conjunctiva and lids unremarkable.  PERRLA  EARS,NOSE,MOUTH,THROAT:  Ears and nose appear unremarkable.  Lips, teeth, gums appear unremarkable.  RESPIRATORY:  Normal respiratory effort.  Lungs clear to auscultation bilaterally.  CARDIOVASCULAR:  Normal S1, S2.  No murmurs rubs or gallops.  No significant lower extremity edema.  GASTROINTESTINAL: Abdomen appears unremarkable.  Nontender.  No hepatomegaly.  No splenomegaly.  LYMPHATIC:  No cervical, supraclavicular, axillary lymphadenopathy.  SKIN:  Warm.  No rashes.  PSYCHIATRIC:  Normal judgment and insight.  Normal mood and affect.          RECENT LABS:        WBC   Date/Time Value Ref Range Status   10/14/2020 09:14 AM 5.18 3.40 - 10.80 10*3/mm3 Final   09/09/2020 09:19 AM 4.46 (L) 4.5 - 11.0 10*3/uL Final     Hemoglobin   Date/Time Value Ref Range Status   10/14/2020 09:14 AM 13.6 12.0 - 15.9 g/dL Final   09/09/2020 09:19 AM 14.0 12.0 - 16.0 g/dL Final     Platelets   Date/Time Value Ref Range Status   10/14/2020 09:14  (L) 140 - 450 10*3/mm3 Final   09/09/2020 09:19  140 - 440 10*3/uL Final       Assessment/Plan     *  IgM lambda monoclonal gammopathy of unknown significance. CAT scans showed no evidence of an underlying lymphoma on 08/03/2012.   Labs 9/4/18: No clear evidence of M spike.  Labs 10/16/2019: No evidence of M spike.    K/L ratio normal.  Labs 10/14/2020: No M spike.  Normal K/L ratio.    * Intermittent anemia and intermittent thrombocytopenia. History of borderline ferritin of 43. ferrous sulfate twice per day since 08/10/2012.  GI evaluation by Dr. Monae in the past. Hemoccult cards negative x 3 on /11/20/2012.   Recent iron studies " are normal.   Recent Hb 13.6,       *  Questionable 7 mm L4 lesion seen on CAT scan on 8/3/12. This was not seen on metastatic bone survey. Plan followup The L4 lesion initially seen on 8/3/12 was unchanged with central fat density and was felt by the radiologist to be benign on followup CAT scan on 1/15/13. Therefore, plan no further followup CAT scans.     *Chronic renal insufficiency. She follows with Dr. Teresa Rosales.   Worsened kidney function can be a sign of progression to multiple myeloma.  Therefore, I am following this also.  Creatinine 1    * Questionable small lytic lesion in the skull, first seen on bone survey on 07/08/2013.   · CAT scan of the head 07/ 30/2013 and 10/23/2013 show no change and offered no additional information. She did have some venous lakes in her skull. 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   · Unchanged on bone survey 04/30/2015 and bone survey 8/28/17, and bone survey 10/17/2019  · Therefore, plan no more follow-up of this    *Possible lytic lesion right acetabulum on frontal view of the pelvis on bone survey 8/28/17.  However, not seen on proximal right femur frontal view.  Radiologist felt this likely represented overlying bowel, but stated a follow-up x-ray of the pelvis could be obtained for confirmation.  Patient declined this but agreed to repeat a bone survey.  · Bone survey 10/17/2019: No lytic lesions other than the unchanged lucent area in the skull  · Therefore, plan no more follow-up of this    *Overweight.  Body mass index is 29.37 kg/m².   Obesity can lead to cytopenias through hepatic steatosis.  She should ideally lose weight.      PLAN:   · M.D. visit in one year with serum protein studies including serum free light chains 2 weeks prior.   · Plan no more bone surveys unless labs significantly change.

## 2021-03-02 DIAGNOSIS — Z23 IMMUNIZATION DUE: ICD-10-CM

## 2021-06-22 ENCOUNTER — OFFICE VISIT (OUTPATIENT)
Dept: OBSTETRICS AND GYNECOLOGY | Age: 76
End: 2021-06-22

## 2021-06-22 VITALS
WEIGHT: 195 LBS | BODY MASS INDEX: 28.88 KG/M2 | DIASTOLIC BLOOD PRESSURE: 76 MMHG | SYSTOLIC BLOOD PRESSURE: 132 MMHG | HEIGHT: 69 IN

## 2021-06-22 DIAGNOSIS — N95.2 ATROPHIC VAGINITIS: Primary | ICD-10-CM

## 2021-06-22 DIAGNOSIS — N89.8 VAGINAL ITCHING: ICD-10-CM

## 2021-06-22 DIAGNOSIS — L81.9 HYPOPIGMENTATION: ICD-10-CM

## 2021-06-22 PROCEDURE — 99203 OFFICE O/P NEW LOW 30 MIN: CPT | Performed by: PHYSICIAN ASSISTANT

## 2021-06-22 RX ORDER — FLUTICASONE PROPIONATE 0.05 MG/G
OINTMENT TOPICAL 2 TIMES DAILY
Qty: 15 G | Refills: 0 | Status: SHIPPED | OUTPATIENT
Start: 2021-06-22 | End: 2021-11-03

## 2021-06-22 RX ORDER — PREGABALIN 150 MG/1
150 CAPSULE ORAL 2 TIMES DAILY
COMMUNITY
Start: 2021-05-24 | End: 2022-07-15

## 2021-06-22 NOTE — PROGRESS NOTES
"Subjective     Chief Complaint   Patient presents with   • Gynecologic Exam     new pt was seen by pcp for itching and was told there were some spots that she seen that she should have looked at by gyn.       Carolyn Nicolas is a 75 y.o. No obstetric history on file. whose LMP is No LMP recorded. Patient is postmenopausal. presents with labial itching  Mostly outside  Has diabetes and has h/o yeast infections  Has not used anything to help it  Seems to be worse early am (4 am)    No significant gyn history  No abnormal paps  Last one was in 2019  No unusual d/c    Sees PCP routinely    No Additional Complaints Reported    The following portions of the patient's history were reviewed and updated as appropriate:vital signs, allergies, current medications, past family history, past medical history, past social history, past surgical history and problem list      Review of Systems   Genitourinary:positive for vaginal itching     Objective      /76   Ht 175.3 cm (69\")   Wt 88.5 kg (195 lb)   Breastfeeding No   BMI 28.80 kg/m²     Physical Exam  Genitourinary:         Comments: Hypopigmented tissue with thickened areas noted on border        General:   alert, comfortable and no distress   Heart: Not performed today   Lungs: Not performed today.   Breast: Not performed today   Neck: na   Abdomen: {Not performed today   CVA: Not performed today   Pelvis: External genitalia: hair loss, fat pad loss, abnormal pigmentation and see pic  Vaginal: atrophic mucosa  Cervix: normal appearance   Extremities: Not performed today   Neurologic: negative   Psychiatric: Normal affect, judgement, and mood       Lab Review   Labs: No data reviewed     Imaging   No data reviewed    Assessment/Plan     ASSESSMENT  1. Atrophic vaginitis    2. Hypopigmentation    3. Vaginal itching        PLAN  1. Suspect lichen sclerosus. Prescribed a round of cutivate to pt and plan f/u in 2 wks for possible biopsy for confirmation of dx. HO given " on lichen sclerosus. Disc conservative tx as well, including stable BS, good hydration, avoidance of harsh cleaning products.      2. Medications prescribed this encounter:        New Medications Ordered This Visit   Medications   • fluticasone (CUTIVATE) 0.005 % ointment     Sig: Apply  topically to the appropriate area as directed 2 (Two) Times a Day.     Dispense:  15 g     Refill:  0           Follow up: 2 week(s)    MARY Rivera  6/22/2021

## 2021-06-24 ENCOUNTER — TELEPHONE (OUTPATIENT)
Dept: OBSTETRICS AND GYNECOLOGY | Age: 76
End: 2021-06-24

## 2021-06-24 LAB
A VAGINAE DNA VAG QL NAA+PROBE: NORMAL SCORE
BVAB2 DNA VAG QL NAA+PROBE: NORMAL SCORE
C ALBICANS DNA VAG QL NAA+PROBE: NEGATIVE
C GLABRATA DNA VAG QL NAA+PROBE: NEGATIVE
MEGA1 DNA VAG QL NAA+PROBE: NORMAL SCORE

## 2021-06-24 NOTE — TELEPHONE ENCOUNTER
----- Message from MARY Mcintyre sent at 6/24/2021  9:44 AM EDT -----  Let her know her vaginal swab is neg for bv and yeast

## 2021-07-15 ENCOUNTER — OFFICE VISIT (OUTPATIENT)
Dept: OBSTETRICS AND GYNECOLOGY | Age: 76
End: 2021-07-15

## 2021-07-15 VITALS
WEIGHT: 193 LBS | DIASTOLIC BLOOD PRESSURE: 72 MMHG | HEIGHT: 70 IN | BODY MASS INDEX: 27.63 KG/M2 | SYSTOLIC BLOOD PRESSURE: 130 MMHG

## 2021-07-15 DIAGNOSIS — N90.4 LICHEN SCLEROSUS OF FEMALE GENITALIA: Primary | ICD-10-CM

## 2021-07-15 PROCEDURE — 99212 OFFICE O/P EST SF 10 MIN: CPT | Performed by: OBSTETRICS & GYNECOLOGY

## 2021-07-15 RX ORDER — LIRAGLUTIDE 6 MG/ML
INJECTION SUBCUTANEOUS
COMMUNITY
Start: 2021-07-03 | End: 2022-07-15

## 2021-07-15 NOTE — PROGRESS NOTES
GYN Visit    7/15/2021    Patient: Carolyn Nicolas          MR#:[id number]      Chief Complaint   Patient presents with   • Gynecologic Exam     New gyn: saw Chapis RODRIGUEZKalani on 6/22/21 for ? lichen sclerosus       History of Present Illness    75 y.o. female No obstetric history on file. who presents for  Follow up to lichen sclerosus  The temovate helped tremendously  No issues with using it  Itching has resolved  Discussed chronic nature of problem        No LMP recorded. Patient is postmenopausal.    ________________________________________  Patient Active Problem List   Diagnosis   • GERD (gastroesophageal reflux disease)   • History of colon polyps   • Non-alcoholic fatty liver disease   • Impaired renal function   • Breathlessness on exertion   • Cardiomyopathy (CMS/HCC)   • Benign essential HTN   • CAD in native artery   • Primary osteoarthritis of both knees   • MGUS (monoclonal gammopathy of unknown significance)   • Anemia of chronic renal failure, stage 3 (moderate) (CMS/HCC)   • Chronic pain of both knees   • Arthritis of both knees   • Automatic implantable cardioverter-defibrillator in situ   • Heart failure (CMS/HCC)   • Hyperlipidemia   • Hypertension   • Hypercalcemia   • Generalized ischemic myocardial dysfunction   • Primary osteoarthritis of one knee   • Arthritis of right knee   • Status post total right knee replacement   • History of total knee arthroplasty   • Arthritis of left knee   • History of total knee arthroplasty, right   • Chronic pain of left knee       Past Medical History:   Diagnosis Date   • Anemia    • Arthritis    • Atrial fibrillation (CMS/HCC)    • Cataract    • CHF (congestive heart failure) (CMS/HCC)    • Chronic kidney disease    • Coronary artery disease    • Diabetes mellitus (CMS/HCC)    • GERD (gastroesophageal reflux disease)    • Goiter    • History of cardiomyopathy    • Hyperlipidemia    • Hypertension    • Implantable cardioverter-defibrillator discharge    • Left  bundle branch block    • MGUS (monoclonal gammopathy of unknown significance)    • Osteopenia    • Restless leg    • Shortness of breath    • Sleep apnea        Past Surgical History:   Procedure Laterality Date   • CARDIAC CATHETERIZATION  2005   • CARDIAC DEFIBRILLATOR PLACEMENT  2011   • CARDIAC DEFIBRILLATOR PLACEMENT     • CATARACT EXTRACTION     • COLONOSCOPY     • COLONOSCOPY N/A 1/8/2020    Procedure: COLONOSCOPY to Cecum and TI;  Surgeon: Dwight Monae MD;  Location: [facility] ENDOSCOPY;  Service: Gastroenterology   • CORONARY ANGIOPLASTY WITH STENT PLACEMENT  2010   • ENDOSCOPY     • ENDOSCOPY N/A 1/8/2020    Procedure: ESOPHAGOGASTRODUODENOSCOPY with Bx's;  Surgeon: Dwight Monae MD;  Location: [facility] ENDOSCOPY;  Service: Gastroenterology   • ENDOSCOPY AND COLONOSCOPY N/A 3/8/2016    Procedure: ESOPHAGOGASTRODUODENOSCOPY AND COLONOSCOPY;  Surgeon: Dwight Monae MD;  Location: [facility] ENDOSCOPY;  Service:    • EYE SURGERY     • JOINT REPLACEMENT     • KNEE ARTHROSCOPY Bilateral 2009 2007   • TOTAL KNEE ARTHROPLASTY Right 2/14/2017    Procedure: TOTAL KNEE ARTHROPLASTY WITH MARGE NAVIGATION;  Surgeon: Andrew Monae MD;  Location: [facility] MAIN OR;  Service:        Social History     Tobacco Use   Smoking Status Never Smoker   Smokeless Tobacco Never Used       has a current medication list which includes the following prescription(s): aspirin, atorvastatin, b-d uf iii mini pen needles, bd pen needle jose u/f, carvedilol, entresto, esomeprazole, fluticasone, furosemide, glimepiride, fluad quadrivalent, insulin pen needle, victoza, liraglutide, one touch ultra test, onetouch delica lancets 33g, pramipexole, pregabalin, vitamin d, and amoxicillin.  ________________________________________    Current contraception: post menopausal status      The following portions of the patient's history were reviewed and updated as appropriate: allergies, current medications, past family history, past medical  "history, past social history, past surgical history and problem list.    Review of Systems   Constitutional: Negative for chills, fatigue and fever.   Genitourinary: Positive for genital sores. Negative for pelvic pain and vaginal bleeding.   Psychiatric/Behavioral: Negative for dysphoric mood.   All other systems reviewed and are negative.      Pertinent items are noted in HPI.     Objective   Physical Exam    /72   Ht 176.5 cm (69.5\")   Wt 87.5 kg (193 lb)   Breastfeeding No   BMI 28.09 kg/m²    BP Readings from Last 3 Encounters:   07/15/21 130/72   06/22/21 132/76   10/28/20 126/57      Wt Readings from Last 3 Encounters:   07/15/21 87.5 kg (193 lb)   06/22/21 88.5 kg (195 lb)   10/28/20 90.3 kg (199 lb)      BMI: Estimated body mass index is 28.09 kg/m² as calculated from the following:    Height as of this encounter: 176.5 cm (69.5\").    Weight as of this encounter: 87.5 kg (193 lb).    Lungs: non labored breathing, no wheezing or tachpnea  Extremities: extremities normal, atraumatic, no cyanosis or edema  Skin: Skin color, texture, turgor normal. No rashes or lesions  Neurologic: Grossly normal  General:   alert, appears stated age and cooperative   Abdomen: soft, non-tender, without masses or organomegaly       Vulva: atrophy and thinned, hypopigmented appearance, no suspicious lesion   Vagina: normal mucosa                 Assessment:      Diagnoses and all orders for this visit:    1. Lichen sclerosus of female genitalia (Primary)      Appears improved tremendously  No areas of suspicion to biopsy        "

## 2021-08-27 ENCOUNTER — OFFICE (OUTPATIENT)
Dept: URBAN - METROPOLITAN AREA CLINIC 65 | Facility: CLINIC | Age: 76
End: 2021-08-27

## 2021-08-27 VITALS
HEIGHT: 69 IN | HEART RATE: 79 BPM | SYSTOLIC BLOOD PRESSURE: 156 MMHG | WEIGHT: 199 LBS | DIASTOLIC BLOOD PRESSURE: 97 MMHG

## 2021-08-27 DIAGNOSIS — K21.9 GASTRO-ESOPHAGEAL REFLUX DISEASE WITHOUT ESOPHAGITIS: ICD-10-CM

## 2021-08-27 DIAGNOSIS — K76.0 FATTY (CHANGE OF) LIVER, NOT ELSEWHERE CLASSIFIED: ICD-10-CM

## 2021-08-27 DIAGNOSIS — Z86.010 PERSONAL HISTORY OF COLONIC POLYPS: ICD-10-CM

## 2021-08-27 PROCEDURE — 99213 OFFICE O/P EST LOW 20 MIN: CPT | Performed by: INTERNAL MEDICINE

## 2021-10-13 ENCOUNTER — LAB (OUTPATIENT)
Dept: LAB | Facility: HOSPITAL | Age: 76
End: 2021-10-13

## 2021-10-13 DIAGNOSIS — D47.2 MGUS (MONOCLONAL GAMMOPATHY OF UNKNOWN SIGNIFICANCE): ICD-10-CM

## 2021-10-13 LAB
ANION GAP SERPL CALCULATED.3IONS-SCNC: 12.4 MMOL/L (ref 5–15)
BASOPHILS # BLD AUTO: 0.02 10*3/MM3 (ref 0–0.2)
BASOPHILS NFR BLD AUTO: 0.2 % (ref 0–1.5)
BUN SERPL-MCNC: 20 MG/DL (ref 6–20)
BUN/CREAT SERPL: 20.8 (ref 7.3–30)
CALCIUM SPEC-SCNC: 9.3 MG/DL (ref 8.5–10.2)
CHLORIDE SERPL-SCNC: 108 MMOL/L (ref 98–107)
CO2 SERPL-SCNC: 20.6 MMOL/L (ref 22–29)
CREAT SERPL-MCNC: 0.96 MG/DL (ref 0.6–1.1)
DEPRECATED RDW RBC AUTO: 45.1 FL (ref 37–54)
EOSINOPHIL # BLD AUTO: 0.07 10*3/MM3 (ref 0–0.4)
EOSINOPHIL NFR BLD AUTO: 0.6 % (ref 0.3–6.2)
ERYTHROCYTE [DISTWIDTH] IN BLOOD BY AUTOMATED COUNT: 13.1 % (ref 12.3–15.4)
GFR SERPL CREATININE-BSD FRML MDRD: 57 ML/MIN/1.73
GLUCOSE SERPL-MCNC: 109 MG/DL (ref 74–124)
HCT VFR BLD AUTO: 40.8 % (ref 34–46.6)
HGB BLD-MCNC: 13.6 G/DL (ref 12–15.9)
IMM GRANULOCYTES # BLD AUTO: 0.06 10*3/MM3 (ref 0–0.05)
IMM GRANULOCYTES NFR BLD AUTO: 0.5 % (ref 0–0.5)
LYMPHOCYTES # BLD AUTO: 2.56 10*3/MM3 (ref 0.7–3.1)
LYMPHOCYTES NFR BLD AUTO: 23.4 % (ref 19.6–45.3)
MCH RBC QN AUTO: 31.3 PG (ref 26.6–33)
MCHC RBC AUTO-ENTMCNC: 33.3 G/DL (ref 31.5–35.7)
MCV RBC AUTO: 94 FL (ref 79–97)
MONOCYTES # BLD AUTO: 0.48 10*3/MM3 (ref 0.1–0.9)
MONOCYTES NFR BLD AUTO: 4.4 % (ref 5–12)
NEUTROPHILS NFR BLD AUTO: 7.74 10*3/MM3 (ref 1.7–7)
NEUTROPHILS NFR BLD AUTO: 70.9 % (ref 42.7–76)
NRBC BLD AUTO-RTO: 0 /100 WBC (ref 0–0.2)
PLATELET # BLD AUTO: 120 10*3/MM3 (ref 140–450)
PMV BLD AUTO: 12.3 FL (ref 6–12)
POTASSIUM SERPL-SCNC: 3.9 MMOL/L (ref 3.5–4.7)
RBC # BLD AUTO: 4.34 10*6/MM3 (ref 3.77–5.28)
SODIUM SERPL-SCNC: 141 MMOL/L (ref 134–145)
WBC # BLD AUTO: 10.93 10*3/MM3 (ref 3.4–10.8)

## 2021-10-13 PROCEDURE — 80048 BASIC METABOLIC PNL TOTAL CA: CPT

## 2021-10-13 PROCEDURE — 36415 COLL VENOUS BLD VENIPUNCTURE: CPT

## 2021-10-13 PROCEDURE — 85025 COMPLETE CBC W/AUTO DIFF WBC: CPT

## 2021-10-14 LAB
ALBUMIN SERPL ELPH-MCNC: 3.4 G/DL (ref 2.9–4.4)
ALBUMIN/GLOB SERPL: 1.2 {RATIO} (ref 0.7–1.7)
ALPHA1 GLOB SERPL ELPH-MCNC: 0.2 G/DL (ref 0–0.4)
ALPHA2 GLOB SERPL ELPH-MCNC: 0.8 G/DL (ref 0.4–1)
B-GLOBULIN SERPL ELPH-MCNC: 0.9 G/DL (ref 0.7–1.3)
GAMMA GLOB SERPL ELPH-MCNC: 1 G/DL (ref 0.4–1.8)
GLOBULIN SER-MCNC: 2.9 G/DL (ref 2.2–3.9)
IGA SERPL-MCNC: 131 MG/DL (ref 64–422)
IGG SERPL-MCNC: 804 MG/DL (ref 586–1602)
IGM SERPL-MCNC: 291 MG/DL (ref 26–217)
INTERPRETATION SERPL IEP-IMP: ABNORMAL
KAPPA LC FREE SER-MCNC: 19.7 MG/L (ref 3.3–19.4)
KAPPA LC FREE/LAMBDA FREE SER: 1.04 {RATIO} (ref 0.26–1.65)
LABORATORY COMMENT REPORT: ABNORMAL
LAMBDA LC FREE SERPL-MCNC: 19 MG/L (ref 5.7–26.3)
M PROTEIN SERPL ELPH-MCNC: ABNORMAL G/DL
PROT SERPL-MCNC: 6.3 G/DL (ref 6–8.5)

## 2021-10-27 ENCOUNTER — OFFICE VISIT (OUTPATIENT)
Dept: ONCOLOGY | Facility: CLINIC | Age: 76
End: 2021-10-27

## 2021-10-27 ENCOUNTER — DOCUMENTATION (OUTPATIENT)
Dept: ONCOLOGY | Facility: CLINIC | Age: 76
End: 2021-10-27

## 2021-10-27 ENCOUNTER — TELEPHONE (OUTPATIENT)
Dept: ONCOLOGY | Facility: CLINIC | Age: 76
End: 2021-10-27

## 2021-10-27 ENCOUNTER — APPOINTMENT (OUTPATIENT)
Dept: LAB | Facility: HOSPITAL | Age: 76
End: 2021-10-27

## 2021-10-27 VITALS
TEMPERATURE: 96.7 F | OXYGEN SATURATION: 95 % | HEIGHT: 69 IN | SYSTOLIC BLOOD PRESSURE: 142 MMHG | BODY MASS INDEX: 29.52 KG/M2 | RESPIRATION RATE: 18 BRPM | WEIGHT: 199.3 LBS | HEART RATE: 85 BPM | DIASTOLIC BLOOD PRESSURE: 77 MMHG

## 2021-10-27 DIAGNOSIS — D47.2 MGUS (MONOCLONAL GAMMOPATHY OF UNKNOWN SIGNIFICANCE): Primary | ICD-10-CM

## 2021-10-27 DIAGNOSIS — M79.89 LEG SWELLING: ICD-10-CM

## 2021-10-27 PROCEDURE — 99214 OFFICE O/P EST MOD 30 MIN: CPT | Performed by: INTERNAL MEDICINE

## 2021-10-27 RX ORDER — GLIMEPIRIDE 4 MG/1
1 TABLET ORAL 2 TIMES DAILY
COMMUNITY
Start: 2021-06-18 | End: 2022-10-26

## 2021-10-27 RX ORDER — INSULIN LISPRO 100 [IU]/ML
INJECTION, SOLUTION INTRAVENOUS; SUBCUTANEOUS
COMMUNITY
Start: 2021-10-08

## 2021-10-27 RX ORDER — PREDNISONE 10 MG/1
TABLET ORAL
COMMUNITY
Start: 2021-09-28 | End: 2022-07-15

## 2021-10-27 NOTE — PROGRESS NOTES
Darya, I see you sent me this message at 1030 this morning.  There is no reading in the chart.    If she has an acute DVT she needs to begin Eliquis 10 mg twice per day for 7 days, then 5 mg twice per day after that.    If the Doppler is going to be done after the office closes, please make sure a prescription is ready at her pharmacy in case she needs it tonight and make sure out-of-pocket expense is manageable for the patient.    Please make sure the patient understands if she does not have an acute DVT she does not need to  the prescription.    ===View-only below this line===  ----- Message -----  From: Darya Marshall  Sent: 10/27/2021  10:31 AM EDT  To: MD Dr. Ishmael Quevedo II:    Mrs. Nicolas is going down to [facility] right now for STAT doppler

## 2021-10-27 NOTE — PROGRESS NOTES
Subjective .     REASONS FOR FOLLOWUP:  MGUS, intermittent anemia and thrombocytopenia, questionable skull lesion    HISTORY OF PRESENT ILLNESS:  The patient is a 75 y.o. year old female  who is here for follow-up with the above-mentioned history.    No fever, chills, weight loss, night sweats.  Complains of left leg swelling for the past few weeks.  States her podiatrist gave her prednisone for this.  She plans on seeing her PCP for this.  Denies bleeding    Past Medical History:   Diagnosis Date   • Anemia    • Arthritis    • Atrial fibrillation (HCC)    • Cataract    • CHF (congestive heart failure) (HCC)    • Chronic kidney disease    • Coronary artery disease    • Diabetes mellitus (HCC)    • GERD (gastroesophageal reflux disease)    • Goiter    • History of cardiomyopathy    • Hyperlipidemia    • Hypertension    • Implantable cardioverter-defibrillator discharge    • Left bundle branch block    • MGUS (monoclonal gammopathy of unknown significance)    • Osteopenia    • Restless leg    • Shortness of breath    • Sleep apnea      Past Surgical History:   Procedure Laterality Date   • CARDIAC CATHETERIZATION  2005   • CARDIAC DEFIBRILLATOR PLACEMENT  2011   • CARDIAC DEFIBRILLATOR PLACEMENT     • CATARACT EXTRACTION     • COLONOSCOPY     • COLONOSCOPY N/A 1/8/2020    Procedure: COLONOSCOPY to Cecum and TI;  Surgeon: Dwight Monae MD;  Location: [facility] ENDOSCOPY;  Service: Gastroenterology   • CORONARY ANGIOPLASTY WITH STENT PLACEMENT  2010   • ENDOSCOPY     • ENDOSCOPY N/A 1/8/2020    Procedure: ESOPHAGOGASTRODUODENOSCOPY with Bx's;  Surgeon: Dwight Monae MD;  Location: [facility] ENDOSCOPY;  Service: Gastroenterology   • ENDOSCOPY AND COLONOSCOPY N/A 3/8/2016    Procedure: ESOPHAGOGASTRODUODENOSCOPY AND COLONOSCOPY;  Surgeon: Dwight Monae MD;  Location: [facility] ENDOSCOPY;  Service:    • EYE SURGERY     • JOINT REPLACEMENT     • KNEE ARTHROSCOPY Bilateral 2009 2007   • TOTAL KNEE ARTHROPLASTY Right  2/14/2017    Procedure: TOTAL KNEE ARTHROPLASTY WITH MARGE NAVIGATION;  Surgeon: Andrew Monae MD;  Location: [facility];  Service:        HEMATOLOGIC/ONCOLOGIC HISTORY:  (History from previous dates can be found in the separate document.)    MEDICATIONS    Current Outpatient Medications:   •  amoxicillin (AMOXIL) 500 MG capsule, TAKE 4 CAPSULES BY MOUTH 1 HR PRIOR TO DENTAL APPT., Disp: , Rfl:   •  aspirin 81 MG tablet, Take 162 mg by mouth Daily., Disp: , Rfl:   •  atorvastatin (LIPITOR) 20 MG tablet, Take 20 mg by mouth Every Night., Disp: , Rfl:   •  B-D UF III MINI PEN NEEDLES 31G X 5 MM misc, INJECT 1 EACH INTO THE SKIN DAILY, Disp: , Rfl: 6  •  BD PEN NEEDLE JACOB U/F 32G X 4 MM misc, , Disp: , Rfl:   •  carvedilol (COREG) 6.25 MG tablet, Take 6.25 mg by mouth 2 (Two) Times a Day. 2 tablets in the am and 3 in the pm, Disp: , Rfl:   •  ENTRESTO 49-51 MG tablet, TAKE 1 TABLET TWICE A DAY, Disp: 180 tablet, Rfl: 1  •  esomeprazole (NexIUM) 40 MG capsule, Take 40 mg by mouth Every Morning Before Breakfast. TAKE 1 CAPSULE BY MOUTH EVERY MORNING, Disp: , Rfl: 3  •  fluticasone (CUTIVATE) 0.005 % ointment, Apply  topically to the appropriate area as directed 2 (Two) Times a Day., Disp: 15 g, Rfl: 0  •  furosemide (LASIX) 40 MG tablet, Take 40 mg by mouth Daily As Needed. 1-2 tablets per day, Disp: , Rfl:   •  glimepiride (AMARYL) 4 MG tablet, , Disp: , Rfl:   •  glimepiride (AMARYL) 4 MG tablet, Take 1 tablet by mouth 2 (Two) Times a Day., Disp: , Rfl:   •  Influenza Vac A&B SA Adj Quad (Fluad Quadrivalent) 0.5 ML prefilled syringe injection, PHARMACY ADMINISTERED, Disp: , Rfl:   •  Insulin Lispro, 1 Unit Dial, (HumaLOG KwikPen) 100 UNIT/ML solution pen-injector, Inject 5 units for every 50 blood glucose is over 200.., Disp: , Rfl:   •  Insulin Pen Needle (B-D UF III MINI PEN NEEDLES) 31G X 5 MM misc, INJECT 1 EACH INTO THE SKIN DAILY, Disp: , Rfl:   •  Liraglutide (Victoza) 18 MG/3ML solution pen-injector  injection, INJECT 1.8 MG UNDER THE SKIN DAILY, Disp: , Rfl:   •  Liraglutide 18 MG/3ML solution pen-injector, Inject  under the skin daily., Disp: , Rfl:   •  ONE TOUCH ULTRA TEST test strip, USE TO TEST BLOOD SUGAR FOUR TIMES DAILY (DX: 250.02), Disp: , Rfl: 3  •  ONETOUCH DELICA LANCETS 33G misc, USE TO TEST BLOOD SUGAR FOUR TIMES DAILY (DX:250.02), Disp: , Rfl: 3  •  pramipexole (MIRAPEX) 0.5 MG tablet, Take 0.5 mg by mouth Every Night., Disp: , Rfl:   •  predniSONE (DELTASONE) 10 MG tablet, , Disp: , Rfl:   •  vitamin D (ERGOCALCIFEROL) 30723 UNITS capsule capsule, Take 50,000 Units by mouth 1 (One) Time Per Week., Disp: , Rfl:   •  pregabalin (LYRICA) 150 MG capsule, Take 150 mg by mouth 2 (Two) Times a Day., Disp: , Rfl:     ALLERGIES:     Allergies   Allergen Reactions   • Amoxicillin Nausea And Vomiting     Patient states that she does not have an issue with this anymore.       SOCIAL HISTORY:       Social History     Socioeconomic History   • Marital status:    • Years of education: College   Tobacco Use   • Smoking status: Never Smoker   • Smokeless tobacco: Never Used   Substance and Sexual Activity   • Alcohol use: No     Comment: rare   • Drug use: No   • Sexual activity: Defer         FAMILY HISTORY:  Family History   Problem Relation Age of Onset   • Diabetes Mother    • Heart disease Mother    • Hypertension Mother    • Heart failure Mother    • Arthritis Mother    • Stroke Mother    • Heart disease Father    • Cancer Father 85        Bladder   • Heart attack Father    • Arthritis Father    • Sleep apnea Father    • Cancer Brother 58        Bladder   • Heart disease Maternal Grandmother    • Heart disease Maternal Grandfather        REVIEW OF SYSTEMS:  Review of Systems   Constitutional: Negative for activity change.   HENT: Negative for nosebleeds and trouble swallowing.    Respiratory: Negative for shortness of breath and wheezing.    Cardiovascular: Negative for chest pain and  "palpitations.   Gastrointestinal: Negative for constipation, diarrhea and nausea.   Genitourinary: Negative for dysuria and hematuria.   Musculoskeletal: Negative for arthralgias and myalgias.   Skin: Negative for rash and wound.   Neurological: Negative for seizures and syncope.   Hematological: Negative for adenopathy. Does not bruise/bleed easily.   Psychiatric/Behavioral: Negative for confusion.        Objective    Vitals:    10/27/21 0929   BP: 142/77   Pulse: 85   Resp: 18   Temp: 96.7 °F (35.9 °C)   TempSrc: Temporal   SpO2: 95%   Weight: 90.4 kg (199 lb 4.8 oz)   Height: 176.5 cm (69.49\")   PainSc: 0-No pain     Current Status 10/27/2021   ECOG score 0      PHYSICAL EXAM:        CONSTITUTIONAL:  Vital signs reviewed.  No distress, looks comfortable.  EYES:  Conjunctiva and lids unremarkable.  PERRLA  EARS,NOSE,MOUTH,THROAT:  Ears and nose appear unremarkable.  Lips, teeth, gums appear unremarkable.  RESPIRATORY:  Normal respiratory effort.  Lungs clear to auscultation bilaterally.  CARDIOVASCULAR:  Normal S1, S2.  No murmurs rubs or gallops.  Bilateral lower extremity swelling.  Left ankle larger than right but right lower leg larger than left  GASTROINTESTINAL: Abdomen appears unremarkable.  Nontender.  No hepatomegaly.  No splenomegaly.  LYMPHATIC:  No cervical, supraclavicular, axillary lymphadenopathy.  SKIN:  Warm.  No rashes.  PSYCHIATRIC:  Normal judgment and insight.  Normal mood and affect.        RECENT LABS:        WBC   Date/Time Value Ref Range Status   10/13/2021 10:04 AM 10.93 (H) 3.40 - 10.80 10*3/mm3 Final   03/09/2021 09:29 AM 6.33 4.5 - 11.0 10*3/uL Final     Hemoglobin   Date/Time Value Ref Range Status   10/13/2021 10:04 AM 13.6 12.0 - 15.9 g/dL Final   03/09/2021 09:29 AM 14.7 12.0 - 16.0 g/dL Final     Platelets   Date/Time Value Ref Range Status   10/13/2021 10:04  (L) 140 - 450 10*3/mm3 Final   03/09/2021 09:29  (L) 140 - 440 10*3/uL Final       Assessment/Plan     *  IgM " lambda monoclonal gammopathy of unknown significance. CAT scans showed no evidence of an underlying lymphoma on 08/03/2012.   Labs 9/4/18: No clear evidence of M spike.  Labs 10/16/2019: No evidence of M spike.    K/L ratio normal.  · Labs 10/14/2020: No M spike.  Normal K/L ratio.  · 10/13/2021: No M spike.  Normal K/L ratio.    * Intermittent anemia and intermittent thrombocytopenia. History of borderline ferritin of 43. ferrous sulfate twice per day since 08/10/2012.  GI evaluation by Dr. Monae in the past. Hemoccult cards negative x 3 on /11/20/2012.   Recent iron studies are normal.   10/13/2021: Hb 13.6,       *  Questionable 7 mm L4 lesion seen on CAT scan on 8/3/12. This was not seen on metastatic bone survey. Plan followup The L4 lesion initially seen on 8/3/12 was unchanged with central fat density and was felt by the radiologist to be benign on followup CAT scan on 1/15/13. Therefore, plan no further followup CAT scans.     *Chronic renal insufficiency. She follows with Dr. Teresa Rosales.   Worsened kidney function can be a sign of progression to multiple myeloma.  Therefore, I am following this also.  Creatinine 0.96    * Questionable small lytic lesion in the skull, first seen on bone survey on 07/08/2013.   · CAT scan of the head 07/ 30/2013 and 10/23/2013 show no change and offered no additional information. She did have some venous lakes in her skull. 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   · Unchanged on bone survey 04/30/2015 and bone survey 8/28/17, and bone survey 10/17/2019  · Therefore, plan no more follow-up of this    *Possible lytic lesion right acetabulum on frontal view of the pelvis on bone survey 8/28/17.  However, not seen on proximal right femur frontal view.  Radiologist felt this likely represented overlying bowel, but stated a follow-up x-ray of the pelvis could be obtained for confirmation.  Patient declined this but agreed to repeat a bone survey.  · Bone survey 10/17/2019: No  lytic lesions other than the unchanged lucent area in the skull  · Therefore, plan no more follow-up of this    *Overweight.  Body mass index is 29.02 kg/m².  BMI 25 to <30 is overweight  BMI 30 to <35 is class 1 obesity  BMI 35 to <40 is class 2 obesity  BMI 40 or higher is class 3 obesity   Being overweight can lead to cytopenias through hepatic steatosis.  She should ideally lose weight.  Remains overweight.  Ideally, lose weight    *Bilateral lower extremity swelling  · She complains of left leg swelling.  On exam, left ankle larger than right ankle.  However, right lower leg larger than left lower leg.  Check bilateral lower extremity Doppler      PLAN:   · M.D. visit in one year with serum protein studies including serum free light chains 2 weeks prior.   · Plan no more bone surveys unless labs significantly change.  · Bilateral lower extremity Doppler.  If acute clot is found, plan Eliquis.  Patient states she has good insurance coverage for prescription drugs.      Addendum  Doppler negative for DVT

## 2021-10-27 NOTE — TELEPHONE ENCOUNTER
----- Message from Avery Quiñones II, MD sent at 10/27/2021  4:21 PM EDT -----  I was just told the Doppler was negative.  Therefore, no need to send an Eliquis prescription.  If someone besides Darya takes care of this, please also let Darya know

## 2021-11-03 RX ORDER — FLUTICASONE PROPIONATE 0.05 MG/G
OINTMENT TOPICAL
Qty: 15 G | Refills: 0 | Status: SHIPPED | OUTPATIENT
Start: 2021-11-03 | End: 2022-04-21

## 2021-12-28 ENCOUNTER — TRANSCRIBE ORDERS (OUTPATIENT)
Dept: PHYSICAL THERAPY | Facility: CLINIC | Age: 76
End: 2021-12-28

## 2021-12-28 DIAGNOSIS — M92.62 HAGLUND'S DEFORMITY, LEFT: Primary | ICD-10-CM

## 2021-12-28 DIAGNOSIS — M21.862 ACQUIRED INTERNAL TIBIAL TORSION, LEFT: ICD-10-CM

## 2022-01-11 ENCOUNTER — TREATMENT (OUTPATIENT)
Dept: PHYSICAL THERAPY | Facility: CLINIC | Age: 77
End: 2022-01-11

## 2022-01-11 DIAGNOSIS — M76.62 ACHILLES TENDINITIS OF LEFT LOWER EXTREMITY: Primary | ICD-10-CM

## 2022-01-11 DIAGNOSIS — M76.62 ACHILLES BURSITIS OF LEFT LOWER EXTREMITY: ICD-10-CM

## 2022-01-11 DIAGNOSIS — M92.62 HAGLUND'S DEFORMITY OF LEFT HEEL: ICD-10-CM

## 2022-01-11 DIAGNOSIS — R26.9 GAIT DIFFICULTY: ICD-10-CM

## 2022-01-11 PROCEDURE — 97110 THERAPEUTIC EXERCISES: CPT | Performed by: PHYSICAL THERAPIST

## 2022-01-11 PROCEDURE — 97161 PT EVAL LOW COMPLEX 20 MIN: CPT | Performed by: PHYSICAL THERAPIST

## 2022-01-11 PROCEDURE — 97530 THERAPEUTIC ACTIVITIES: CPT | Performed by: PHYSICAL THERAPIST

## 2022-01-11 NOTE — PROGRESS NOTES
Physical Therapy Initial Evaluation and Plan of Care    Patient: Carolyn Nicolas   : 1945  Diagnosis/ICD-10 Code:  Achilles tendinitis of left lower extremity [M76.62]  Referring practitioner: Ramon Leblanc, *  Past medical Hx reviewed: 2022    Subjective Evaluation    History of Present Illness  Onset date: Symptoms began early spring.    Mechanism of injury: I have been dealing with L heel pain for several months.  My pain started with a bone spur in the heel and then the tenderness came with the tendonitis and bursitis around the .  I decided to see my physician and they placed me in a boot and unable to move the ankle much at all.  I should be able to get out of the boot now and the doctor wants me to start weaning from it.  It seems like the boot is irritating the heel.  When I go without it, it actually feels better.    The foot does tend to swell on me.  When it first started, the foot was so swollen, the doctor though I had a blood clot and they sent me for an US.  It was negative.  The swelling is most noticeable over the course of the day.    I can stand still with boot for about 15-20 min.  The pain and swelling starts.  With a pair of walking shoes 25-30 min.        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  Quality of life: good    Pain  Current pain ratin  At best pain ratin  At worst pain ratin  Location: Center and to the L of the heel on L.    Quality: sharp, burning and tight (like I'm getting stuck with something.)  Relieving factors: rest, change in position and medications (Prednisone was taken initially, but it spiked my BP severely.  The topical cream helped a little.  Tylenol.  )  Aggravating factors: stairs, standing, ambulation and prolonged positioning    Social Support  Lives in: one-story house (with basement laundry)    Treatments  Previous treatment: physical therapy    PLOF:  Independent   Objective          Static Posture     Ankle/Foot    Ankle/Foot (Left): Pes planus.     Observations   Left Ankle/Foot   Positive for edema.       Tenderness   Left Ankle/Foot   Tenderness in the Achilles insertion.     Active Range of Motion   Left Ankle/Foot   Dorsiflexion (ke): 0 degrees   Plantar flexion: 50 degrees   Inversion: 25 degrees   Eversion: 10 degrees     Right Ankle/Foot   Dorsiflexion (ke): 5 degrees   Plantar flexion: 60 degrees   Inversion: 30 degrees   Eversion: 20 degrees     Strength/Myotome Testing     Left Ankle/Foot   Dorsiflexion: 5  Plantar flexion: 4+  Inversion: 5  Eversion: 4+  Great toe flexion: 3+  Great toe extension: 4-    Right Ankle/Foot   Dorsiflexion: 5  Plantar flexion: 5  Inversion: 5  Eversion: 5  Great toe flexion: 4+  Great toe extension: 4+    Additional Strength Details  Difficulty with L toe activation and control.  Limited ROM Flexion/ Extension great toe.      Tests   Left Ankle/Foot   Positive for windlass (restricted great toe ext. passisvely).   Negative for anterior drawer, calcaneal squeeze, percussion, posterior drawer, syndesmosis squeeze and syndesmosis external rotation.     Swelling   Left Ankle/Foot   Metatarsal heads: 23 cm  Malleoli: 27 cm    Right Ankle/Foot   Metatarsal heads: 21 cm  Malleoli: 24 cm     Assessment & Plan     Assessment  Impairments: abnormal gait, abnormal or restricted ROM, impaired balance, impaired physical strength, lacks appropriate home exercise program, pain with function and weight-bearing intolerance  Functional Limitations: walking, pushing, uncomfortable because of pain, standing and unable to perform repetitive tasks  Assessment details: Pt presents to PT with signs and symptoms consistent with L achilles tendinitis, bone spur and bursitis.  Of note, she does have diffuse edema of the whole L foot from supramalleolar region down.  Compression garment recommended to address.  Additionally, L neuropathy reported and exhibits noted weakness and joint restriction of the great  toe.  Great toe dysfunction may have contributed to additional stress force through achilles insertion for push-off in gait and standing balance.   Pt would benefit from skilled PT intervention to address the deficits described.    Prognosis: good  Prognosis details:   Short term goals:  2-3 Visits   1.Pt to be instructed in initial HEP.  2. c/o pain to 3/10 for ease with ambulation on TM/level surface up to 15 min without increase in s/s. sustained over 2-3 days.  3. Minimal palpable tenderness to L achilles tendon  4.  Increase ROM (DF to 5, PF to 55 ) to normalize gait, less early heel rise.  5. Pt able to balance on SLS 5 sec. on level surface to help promote safety on uneven terrain.    Long term goals:  5-6  visits  1. Pt to demonstrate independencewith advanced HEP  2. Decrease c/o pain to 1/10 for ease with functional activity mentioned above>30 min.  3. Full (L) Ankle AROM 10º DF, PF: 60 degrees, Inv: 25, Ev: 20  4. LEFS > 70/80  (% perceived normal ability)  5. No palapable tenderness to Achilles tendon.    6.  SLS balance on uneven surface for up to 10 sec. For increased safety and endurance for walking on outdoor uneven surfaces.   7.  Strength to 5-/5 all planes of ankle as needed for prolonged ambulation as needed for home/self-care.      Plan  Therapy options: will be seen for skilled therapy services  Planned modality interventions: ultrasound, TENS and cryotherapy  Planned therapy interventions: joint mobilization, home exercise program, gait training, flexibility, balance/weight-bearing training, manual therapy, neuromuscular re-education, soft tissue mobilization, strengthening, stretching and therapeutic activities  Frequency: 1x week  Duration in visits: 6  Treatment plan discussed with: patient    Manual Therapy:    -     mins  72283;  Therapeutic Exercise:    12     mins  95683;     Neuromuscular Kelle:    -    mins  80068;    Therapeutic Activity:     12     mins  75308;   Pt ed. On iceing,  compression wear, body mechanics, recovery process, gait training with boot weaning.    Gait Training:      -     mins  65254;     Ultrasound:     -     mins  09953;    Electrical Stimulation:    -     mins  67996 ( );  Dry Needling     -     mins self-pay    Timed Treatment:   24   mins   Total Treatment:     55   mins    PT SIGNATURE:  Anthony Howell DPT, PT     SARAHI Echols License #: [id number]    DATE TREATMENT INITIATED: 1/11/2022    Medicare Initial Certification  Certification Period: 4/11/2022  I certify that the therapy services are furnished while this patient is under my care.  The services outlined above are required by this patient, and will be reviewed every 90 days.     PHYSICIAN: Ramon Lebalnc DPM      DATE:     Please sign and return via fax to [phone].. Thank you, [facility].     normal

## 2022-01-18 ENCOUNTER — TREATMENT (OUTPATIENT)
Dept: PHYSICAL THERAPY | Facility: CLINIC | Age: 77
End: 2022-01-18

## 2022-01-18 DIAGNOSIS — M92.62 HAGLUND'S DEFORMITY OF LEFT HEEL: ICD-10-CM

## 2022-01-18 DIAGNOSIS — M76.62 ACHILLES TENDINITIS OF LEFT LOWER EXTREMITY: Primary | ICD-10-CM

## 2022-01-18 DIAGNOSIS — M76.62 ACHILLES BURSITIS OF LEFT LOWER EXTREMITY: ICD-10-CM

## 2022-01-18 DIAGNOSIS — R26.9 GAIT DIFFICULTY: ICD-10-CM

## 2022-01-18 PROCEDURE — 97140 MANUAL THERAPY 1/> REGIONS: CPT | Performed by: PHYSICAL THERAPIST

## 2022-01-18 PROCEDURE — 97110 THERAPEUTIC EXERCISES: CPT | Performed by: PHYSICAL THERAPIST

## 2022-01-18 NOTE — PROGRESS NOTES
"Physical Therapy Daily Progress Note      Patient: Carolyn Nicolas   : 1945  Diagnosis/ICD-10 Code:  Achilles tendinitis of left lower extremity [M76.62]  Referring practitioner: Ramon Leblanc, *  Date of Initial Visit: Type: THERAPY  Noted: 2022  Today's Date: 2022  Patient seen for 2 sessions    Subjective : Carolyn Nicolas reports: The L foot is really swollen and I've had more pain since yesterday.  I have been on it more and I even tried putting my boot back on that seemed to bother it more.  I did get compression sock which helps (when I can get it on).  It's hard for me to bend over to do it.  I use a \"sock kit\" to help get it started.      Objective: Significant L LE edema noted in the lower leg and foot.  Heel deformity noticeably enlarged today.    See Exercise, Manual, and Modality Logs for complete treatment.     Assessment/Plan:Today's session focused on manual intervention of edema control with effleurage to the foot and up toward the knee.  We also performed joint mobilization the the great and lesser toes to allow better rollover in gait to lessen strain on the heel cord.  Additionally, talocrural joint mobilizations were also applied.  Less pain reported following but we opted to only perform basic exercises today.  She did respond well to PT and may benefit from trial of iontophoresis application to the heel.      Progress per Plan of Care and Progress strengthening /stabilization /functional activity     Manual Therapy:    25     mins  46456;  Therapeutic Exercise:    20     mins  38115;     Neuromuscular Kelle:    5    mins  57785;  K-taping for Achilles insertion.    Therapeutic Activity:     -     mins  04970;     Gait Training:      -     mins  32548;     Ultrasound:     -     mins  70062;    Electrical Stimulation:    -     mins  35839 ( );  Dry Needling     -     mins self-pay    Timed Treatment:   45   mins   Total Treatment:     50   mins      Anthony " Dante, PT  [place] License #[id number]    Physical Therapist

## 2022-02-01 ENCOUNTER — TREATMENT (OUTPATIENT)
Dept: PHYSICAL THERAPY | Facility: CLINIC | Age: 77
End: 2022-02-01

## 2022-02-01 DIAGNOSIS — R26.9 GAIT DIFFICULTY: ICD-10-CM

## 2022-02-01 DIAGNOSIS — M76.62 ACHILLES BURSITIS OF LEFT LOWER EXTREMITY: ICD-10-CM

## 2022-02-01 DIAGNOSIS — M76.62 ACHILLES TENDINITIS OF LEFT LOWER EXTREMITY: Primary | ICD-10-CM

## 2022-02-01 DIAGNOSIS — M92.62 HAGLUND'S DEFORMITY OF LEFT HEEL: ICD-10-CM

## 2022-02-01 PROCEDURE — 97530 THERAPEUTIC ACTIVITIES: CPT | Performed by: PHYSICAL THERAPIST

## 2022-02-01 PROCEDURE — 97140 MANUAL THERAPY 1/> REGIONS: CPT | Performed by: PHYSICAL THERAPIST

## 2022-02-01 PROCEDURE — 97110 THERAPEUTIC EXERCISES: CPT | Performed by: PHYSICAL THERAPIST

## 2022-02-01 NOTE — PROGRESS NOTES
Physical Therapy Daily Progress Note      Patient: Carolyn Nicolas   : 1945  Diagnosis/ICD-10 Code:  Achilles tendinitis of left lower extremity [M76.62]  Referring practitioner: Ramon Leblanc, *  Date of Initial Visit: Type: THERAPY  Noted: 2022  Today's Date: 2022  Patient seen for 3 sessions    Subjective : Carolyn Nicolas reports: The foot swelling and lower leg swelling is down overall and the heel feels a little better but the large bump is still there and is quite irritable at times.      Objective: Noted decreased foot edema without shoe and sock on.  Great toe flexion still limited and ankle DF continues hypomobility.    See Exercise, Manual, and Modality Logs for complete treatment.     Assessment/Plan:Pt tolerated treatment well and she did respond well to K-taping last visit.  We opted to tape again for protection from shear irritations to the posterior heel and to facilitate gastrocs/soleus in gait. We discussed initiating iontophoresis to attempt to break down the presence of the enlarge bony prominece and local inflammation.  She was agreeable and we will reach out to her physician to obtain the proper medication for application.      Progress per Plan of Care and Progress strengthening /stabilization /functional activity     Manual Therapy:    15     mins  74830;  Therapeutic Exercise:    20     mins  16642;     Neuromuscular Kelle:    5    mins  73573;  K-taping  Therapeutic Activity:     10     mins  95471;   Education of iontophoresis, what it does, how its applied and process to start.   Gait Training:      -     mins  50867;     Ultrasound:     -     mins  10424;    Electrical Stimulation:    -     mins  76790 ( );  Dry Needling     -     mins self-pay    Timed Treatment:   50   mins   Total Treatment:     56   mins      SARAHI Echols License #[id number]    Physical Therapist

## 2022-02-08 ENCOUNTER — TREATMENT (OUTPATIENT)
Dept: PHYSICAL THERAPY | Facility: CLINIC | Age: 77
End: 2022-02-08

## 2022-02-08 DIAGNOSIS — M92.62 HAGLUND'S DEFORMITY OF LEFT HEEL: ICD-10-CM

## 2022-02-08 DIAGNOSIS — M76.62 ACHILLES TENDINITIS OF LEFT LOWER EXTREMITY: Primary | ICD-10-CM

## 2022-02-08 DIAGNOSIS — M76.62 ACHILLES BURSITIS OF LEFT LOWER EXTREMITY: ICD-10-CM

## 2022-02-08 DIAGNOSIS — R26.9 GAIT DIFFICULTY: ICD-10-CM

## 2022-02-08 PROCEDURE — 97140 MANUAL THERAPY 1/> REGIONS: CPT | Performed by: PHYSICAL THERAPIST

## 2022-02-08 PROCEDURE — 97110 THERAPEUTIC EXERCISES: CPT | Performed by: PHYSICAL THERAPIST

## 2022-02-08 NOTE — PROGRESS NOTES
Physical Therapy Daily Progress Note      Patient: Carolyn Nicolas   : 1945  Diagnosis/ICD-10 Code:  Achilles tendinitis of left lower extremity [M76.62]  Referring practitioner: Ramon Leblanc, *  Date of Initial Visit: Type: THERAPY  Noted: 2022  Today's Date: 2022  Patient seen for 4 sessions    Subjective : Carolyn Nicolas reports: I woke up this morning and the swelling in the foot was much better.  I have been doing better since the last visit.  I have not heard anything from my doctor in regards to the (iontophoresis) treatment medication.      Objective:   See Exercise, Manual, and Modality Logs for complete treatment.     Assessment/Plan:Pt tolerated treatment well today and we were able to progress to seated closed chain activity for the foot and ankle.      Follow call placed to podiatrist office for iontophoresis medication prescription.     Progress per Plan of Care and Progress strengthening /stabilization /functional activity     Manual Therapy:    12     mins  90179;  Therapeutic Exercise:    35     mins  18795;     Neuromuscular Kelle:    -    mins  38484;    Therapeutic Activity:     -     mins  92862;     Gait Training:      -     mins  73798;     Ultrasound:     -     mins  12990;    Electrical Stimulation:    -     mins  02846 ( );  Dry Needling     -     mins self-pay    Timed Treatment:   47   mins   Total Treatment:     52   mins      SARAHI Echols License #[id number]    Physical Therapist

## 2022-02-15 ENCOUNTER — TREATMENT (OUTPATIENT)
Dept: PHYSICAL THERAPY | Facility: CLINIC | Age: 77
End: 2022-02-15

## 2022-02-15 DIAGNOSIS — M76.62 ACHILLES TENDINITIS OF LEFT LOWER EXTREMITY: Primary | ICD-10-CM

## 2022-02-15 DIAGNOSIS — M76.62 ACHILLES BURSITIS OF LEFT LOWER EXTREMITY: ICD-10-CM

## 2022-02-15 DIAGNOSIS — R26.9 GAIT DIFFICULTY: ICD-10-CM

## 2022-02-15 DIAGNOSIS — M92.62 HAGLUND'S DEFORMITY OF LEFT HEEL: ICD-10-CM

## 2022-02-15 PROCEDURE — 97110 THERAPEUTIC EXERCISES: CPT | Performed by: PHYSICAL THERAPIST

## 2022-02-15 PROCEDURE — 97140 MANUAL THERAPY 1/> REGIONS: CPT | Performed by: PHYSICAL THERAPIST

## 2022-02-15 NOTE — PROGRESS NOTES
Physical Therapy Daily Progress Note      Patient: Carolyn Nicolas   : 1945  Diagnosis/ICD-10 Code:  Achilles tendinitis of left lower extremity [M76.62]  Referring practitioner: Ramon Leblanc, *  Date of Initial Visit: Type: THERAPY  Noted: 2022  Today's Date: 2/15/2022  Patient seen for 5 sessions    Subjective : Carolyn Nicolas reports: I'm doing a little better overall but I can tell when I am up too long or walking too much the whole foot swells.  Doesn't hurt too bad but the foot and ankle are full.      Objective:   See Exercise, Manual, and Modality Logs for complete treatment.     Assessment/Plan:Pt was progressed to standing gastrocs stretching (gently) and provided with some seated achilles tendon eccentric lengthening strengthening activity.  We did clarify the orders for Iontophoresis medication and should be able to initiate treatment next visit.      Progress per Plan of Care and Progress strengthening /stabilization /functional activity     Manual Therapy:    15     mins  27319;  Therapeutic Exercise:    35     mins  78135;     Neuromuscular Kelle:    -    mins  78410;    Therapeutic Activity:     -     mins  47661;     Gait Training:      -     mins  99607;     Ultrasound:     -     mins  22728;    Electrical Stimulation:    -     mins  37708 ( );  Dry Needling     -     mins self-pay    Timed Treatment:   50   mins   Total Treatment:     55   mins      SARAHI Echols License #[id number]    Physical Therapist

## 2022-02-22 ENCOUNTER — TREATMENT (OUTPATIENT)
Dept: PHYSICAL THERAPY | Facility: CLINIC | Age: 77
End: 2022-02-22

## 2022-02-22 DIAGNOSIS — M76.62 ACHILLES TENDINITIS OF LEFT LOWER EXTREMITY: Primary | ICD-10-CM

## 2022-02-22 DIAGNOSIS — M92.62 HAGLUND'S DEFORMITY OF LEFT HEEL: ICD-10-CM

## 2022-02-22 DIAGNOSIS — M76.62 ACHILLES BURSITIS OF LEFT LOWER EXTREMITY: ICD-10-CM

## 2022-02-22 DIAGNOSIS — R26.9 GAIT DIFFICULTY: ICD-10-CM

## 2022-02-22 PROCEDURE — 97140 MANUAL THERAPY 1/> REGIONS: CPT | Performed by: PHYSICAL THERAPIST

## 2022-02-22 PROCEDURE — 97033 APP MDLTY 1+IONTPHRSIS EA 15: CPT | Performed by: PHYSICAL THERAPIST

## 2022-02-22 PROCEDURE — 97110 THERAPEUTIC EXERCISES: CPT | Performed by: PHYSICAL THERAPIST

## 2022-02-24 NOTE — PROGRESS NOTES
Physical Therapy Daily Progress Note      Patient: Carolyn Nicolas   : 1945  Diagnosis/ICD-10 Code:  Achilles tendinitis of left lower extremity [M76.62]  Referring practitioner: Ramon Leblanc, *  Date of Initial Visit: Type: THERAPY  Noted: 2022  Today's Date: 2022  Patient seen for 6 sessions    Subjective : Carolyn Nicolas reports: I received my medication for the electrical pad treatments.  I still struggle with some foot swelling.      Objective:   See Exercise, Manual, and Modality Logs for complete treatment.     Assessment/Plan:Pt was educated to try low grade compression socks to help control the swelling of the L LE, especially with increased WBing times and tolerances.  She consistently demonstrates decreased edema following manual intervention and stretching.  We also initiated iontophoresis treatment with Dexamethasone and was tolerated well at 3.0 mA.  We will increase dose intensity next visit to 4.0mA x 10 min    Progress per Plan of Care and Progress strengthening /stabilization /functional activity     Manual Therapy:    12     mins  74326;  Therapeutic Exercise:    35     mins  01211;     Neuromuscular Kelle:    -    mins  43232;    Therapeutic Activity:     -     mins  47486;     Gait Training:      -     mins  56381;     Ultrasound:     -     mins  08417;    Electrical Stimulation:    -     mins  55972 ( );  Dry Needling     -     mins self-pay  Iontophoresis                18    MIn      87927 (including set-up and education)     Timed Treatment:   47   mins   Total Treatment:     65   mins      SARAHI Echols License #[id number]    Physical Therapist

## 2022-03-01 ENCOUNTER — TREATMENT (OUTPATIENT)
Dept: PHYSICAL THERAPY | Facility: CLINIC | Age: 77
End: 2022-03-01

## 2022-03-01 DIAGNOSIS — M76.62 ACHILLES TENDINITIS OF LEFT LOWER EXTREMITY: Primary | ICD-10-CM

## 2022-03-01 DIAGNOSIS — M76.62 ACHILLES BURSITIS OF LEFT LOWER EXTREMITY: ICD-10-CM

## 2022-03-01 DIAGNOSIS — M92.62 HAGLUND'S DEFORMITY OF LEFT HEEL: ICD-10-CM

## 2022-03-01 DIAGNOSIS — R26.9 GAIT DIFFICULTY: ICD-10-CM

## 2022-03-01 PROCEDURE — 97110 THERAPEUTIC EXERCISES: CPT | Performed by: PHYSICAL THERAPIST

## 2022-03-01 PROCEDURE — 97112 NEUROMUSCULAR REEDUCATION: CPT | Performed by: PHYSICAL THERAPIST

## 2022-03-01 PROCEDURE — 97033 APP MDLTY 1+IONTPHRSIS EA 15: CPT | Performed by: PHYSICAL THERAPIST

## 2022-03-01 NOTE — PROGRESS NOTES
Physical Therapy Daily Progress Note      Patient: Carolyn Nicolas   : 1945  Diagnosis/ICD-10 Code:  No primary diagnosis found.  Referring practitioner: Ramon Leblanc, *  Date of Initial Visit: Type: THERAPY  Noted: 2022  Today's Date: 3/1/2022  Patient seen for 7 sessions    Subjective : Carolyn Nicolas reports: I did get my compression socks and they seem to help with the swelling. I woke up this morning and the toes were more recognizable.      Objective:   See Exercise, Manual, and Modality Logs for complete treatment.     Assessment/Plan:Pt tolerated the progression of iontophoresis intensity to 4.0mA and did well.  We will be working into more functional tasks and graded Wbing activity.      Progress per Plan of Care and Progress strengthening /stabilization /functional activity       Manual Therapy:    -     mins  61137;  Therapeutic Exercise:    35     mins  49019;     Neuromuscular Kelle:    8    mins  29833;    Therapeutic Activity:     -     mins  10710;     Gait Training:      -     mins  02921;     Ultrasound:     -     mins  67642;    Electrical Stimulation:    -     mins  13672 ( );  Dry Needling     -     mins self-pay  Iontophoresis                12 min       Timed Treatment:   43   mins   Total Treatment:     60   mins      SARAHI Echols License #[id number]    Physical Therapist

## 2022-03-07 ENCOUNTER — TREATMENT (OUTPATIENT)
Dept: PHYSICAL THERAPY | Facility: CLINIC | Age: 77
End: 2022-03-07

## 2022-03-07 DIAGNOSIS — M76.62 ACHILLES TENDINITIS OF LEFT LOWER EXTREMITY: Primary | ICD-10-CM

## 2022-03-07 DIAGNOSIS — R26.9 GAIT DIFFICULTY: ICD-10-CM

## 2022-03-07 DIAGNOSIS — M76.62 ACHILLES BURSITIS OF LEFT LOWER EXTREMITY: ICD-10-CM

## 2022-03-07 DIAGNOSIS — M92.62 HAGLUND'S DEFORMITY OF LEFT HEEL: ICD-10-CM

## 2022-03-07 PROCEDURE — 97033 APP MDLTY 1+IONTPHRSIS EA 15: CPT | Performed by: PHYSICAL THERAPIST

## 2022-03-07 PROCEDURE — 97140 MANUAL THERAPY 1/> REGIONS: CPT | Performed by: PHYSICAL THERAPIST

## 2022-03-07 PROCEDURE — 97112 NEUROMUSCULAR REEDUCATION: CPT | Performed by: PHYSICAL THERAPIST

## 2022-03-07 PROCEDURE — 97110 THERAPEUTIC EXERCISES: CPT | Performed by: PHYSICAL THERAPIST

## 2022-03-07 NOTE — PROGRESS NOTES
Physical Therapy Daily Progress Note      Patient: Carolyn Nicolas   : 1945  Diagnosis/ICD-10 Code:  Achilles tendinitis of left lower extremity [M76.62]  Referring practitioner: Ramon Leblanc, *  Date of Initial Visit: Type: THERAPY  Noted: 2022  Today's Date: 3/7/2022  Patient seen for 8 sessions    Subjective : Carolyn Nicolas reports: The heel isn't as tender to the touch and it is worse in the morning.  Once I get going and it loosens up its okay.      Objective: -  See Exercise, Manual, and Modality Logs for complete treatment.     Assessment/Plan:Pt tolerated treatment well overall and we have been able to focus more on WBing activity to include balance as well.      Progress per Plan of Care and Progress strengthening /stabilization /functional activity     Manual Therapy:    12     mins  93545;  Therapeutic Exercise:    20     mins  44116;     Neuromuscular Kelle:    8    mins  18983;    Therapeutic Activity:     -     mins  06245;     Gait Training:      -     mins  75858;     Ultrasound:     -     mins  50782;    Electrical Stimulation:    -     mins  50549 ( );  Dry Needling     -     mins self-pay  Iontophoresis                10 min      Timed Treatment:   40   mins   Total Treatment:     55   mins      SARAHI Echols License #[id number]    Physical Therapist

## 2022-03-14 ENCOUNTER — TREATMENT (OUTPATIENT)
Dept: PHYSICAL THERAPY | Facility: CLINIC | Age: 77
End: 2022-03-14

## 2022-03-14 DIAGNOSIS — M92.62 HAGLUND'S DEFORMITY OF LEFT HEEL: ICD-10-CM

## 2022-03-14 DIAGNOSIS — M76.62 ACHILLES BURSITIS OF LEFT LOWER EXTREMITY: Primary | ICD-10-CM

## 2022-03-14 DIAGNOSIS — R26.9 GAIT DIFFICULTY: ICD-10-CM

## 2022-03-14 DIAGNOSIS — M76.62 ACHILLES TENDINITIS OF LEFT LOWER EXTREMITY: ICD-10-CM

## 2022-03-14 PROCEDURE — 97110 THERAPEUTIC EXERCISES: CPT | Performed by: PHYSICAL THERAPIST

## 2022-03-14 PROCEDURE — 97033 APP MDLTY 1+IONTPHRSIS EA 15: CPT | Performed by: PHYSICAL THERAPIST

## 2022-03-14 PROCEDURE — 97140 MANUAL THERAPY 1/> REGIONS: CPT | Performed by: PHYSICAL THERAPIST

## 2022-03-14 NOTE — PROGRESS NOTES
Physical Therapy Daily Progress Note      Patient: Carolyn Nicolas   : 1945  Diagnosis/ICD-10 Code:  Achilles bursitis of left lower extremity [M76.62]  Referring practitioner: Ramon Leblanc, *  Date of Initial Visit: Type: THERAPY  Noted: 2022  Today's Date: 3/14/2022  Patient seen for 9 sessions    Subjective : Carolyn Nicolas reports: The foot is swollen today and I think the swelling is affected by the neuropathy.  I do think the (iontophoresis) treatments are helping.      Objective: -  See Exercise, Manual, and Modality Logs for complete treatment.     Assessment/Plan:Pt tolerated treatment well overall and we will reassess next visit to determine what improvements have been made with her overall function. Her pain levels are less but she still experiences noted swelling in the foot and observable knot on the heel with ongoing DF restriction.      Progress per Plan of Care and Progress strengthening /stabilization /functional activity     Manual Therapy:    15     mins  13470;  Therapeutic Exercise:     15    mins  67680;     Neuromuscular Kelle:    -    mins  01713;    Therapeutic Activity:     -     mins  61224;     Gait Training:      -     mins  57503;     Ultrasound:     -     mins  13133;    Electrical Stimulation:    -     mins  32900 ( );  Dry Needling     -     mins self-pay  Iontophoresis                 15 min      Timed Treatment:   45   mins   Total Treatment:     50   mins      SARAHI Echols License #[id number]    Physical Therapist

## 2022-03-21 ENCOUNTER — TREATMENT (OUTPATIENT)
Dept: PHYSICAL THERAPY | Facility: CLINIC | Age: 77
End: 2022-03-21

## 2022-03-21 DIAGNOSIS — M76.62 ACHILLES BURSITIS OF LEFT LOWER EXTREMITY: Primary | ICD-10-CM

## 2022-03-21 DIAGNOSIS — R26.9 GAIT DIFFICULTY: ICD-10-CM

## 2022-03-21 DIAGNOSIS — M76.62 ACHILLES TENDINITIS OF LEFT LOWER EXTREMITY: ICD-10-CM

## 2022-03-21 DIAGNOSIS — M92.62 HAGLUND'S DEFORMITY OF LEFT HEEL: ICD-10-CM

## 2022-03-21 PROCEDURE — 97530 THERAPEUTIC ACTIVITIES: CPT | Performed by: PHYSICAL THERAPIST

## 2022-03-21 PROCEDURE — 97110 THERAPEUTIC EXERCISES: CPT | Performed by: PHYSICAL THERAPIST

## 2022-03-23 NOTE — PROGRESS NOTES
Physical Therapy Progress Note      Patient: Carolyn Nicolas   : 1945  Diagnosis/ICD-10 Code:  Achilles bursitis of left lower extremity [M76.62]  Referring practitioner: Ramon Leblanc, *  Date of Initial Visit: Type: THERAPY  Noted: 2022  Today's Date: 3/23/2022  Patient seen for 10 sessions    Subjective : Carolyn Nicolas reports: Overall the heel is doing much better and I really don't have that much pain except for in the morning briefly and then if I really over exert myself while on my feet.    Heel pain:  0/10.  At rest.    At worst:  Varies: 5-8/10 but can last breifly (2 min to 15 min) then goes away. Daily, but in AM and if I have not stretched yet.      Objective: Evaluation Vs. (CURRENT)  Tenderness   Left Ankle/Foot   Tenderness in the Achilles insertion.      Active Range of Motion   Left Ankle/Foot   Dorsiflexion (ke): 0 degrees (4 deg) (PROM:8 deg no pain)  Dorsiflexion (standing Closed chain: 15 deg tightness reported, R: 18 deg)   Plantar flexion: 50 degrees (50deg)  Inversion: 25 degrees (30 deg)  Eversion: 10 degrees (18 deg)     Right Ankle/Foot (NOT RE-TESTED)   Dorsiflexion (ke): 5 degrees   Plantar flexion: 60 degrees   Inversion: 30 degrees   Eversion: 20 degrees      Strength/Myotome Testing      Left Ankle/Foot   Dorsiflexion: 5 (5/5)  Plantar flexion: 4+(4+/5)  Inversion: 5 (5/5)   Eversion: 4+ (4+/5)  Great toe flexion: 3+ (4+/5)  Great toe extension: 4- (5/5)     Additional Strength Details  Difficulty with L toe activation and control.  Limited ROM Flexion/ Extension great toe.       Tests   Left Ankle/Foot   Positive for windlass (restricted great toe ext. passisvely).   Negative for anterior drawer, calcaneal squeeze, percussion, posterior drawer, syndesmosis squeeze and syndesmosis external rotation.      Swelling   Left Ankle/Foot   Metatarsal heads: 23 cm (21 cm)   Malleoli: 27 cm (27 cm)     Right Ankle/Foot   Metatarsal heads: 21 cm (20.5 cm)   Malleoli:  24 cm (26 cm)    See Exercise, Manual, and Modality Logs for complete treatment.     Assessment/Plan:   Carolyn has done relatively well with PT intervention and has much less exquisite point pain to the heel.  She is back to ambulating for household and short community distances without issue but the heel deformity is still present.  Heel cord tightness persists but not painful per her report.  We have discussed improved compliance to wearing compression garments to keep (B) LE edema down and less inflammation around the healing tissues.  She agreed to be more compliant over the week and we will re-assess edema in 1 week.     Anticipate DC next Visit       Manual Therapy:    -     mins  38974;  Therapeutic Exercise:    35     mins  75556;     Neuromuscular Kelle:    -    mins  47723;    Therapeutic Activity:     15     mins  53580;   Tests and measures + pt education on edema control.    Gait Training:      -     mins  72880;     Ultrasound:     -     mins  53354;    Electrical Stimulation:    -     mins  83110 ( );  Dry Needling     -     mins self-pay    Timed Treatment:   50   mins   Total Treatment:     55   mins      SARAHI Echols License #[id number]    Physical Therapist

## 2022-03-28 ENCOUNTER — TREATMENT (OUTPATIENT)
Dept: PHYSICAL THERAPY | Facility: CLINIC | Age: 77
End: 2022-03-28

## 2022-03-28 DIAGNOSIS — M92.62 HAGLUND'S DEFORMITY OF LEFT HEEL: ICD-10-CM

## 2022-03-28 DIAGNOSIS — M76.62 ACHILLES TENDINITIS OF LEFT LOWER EXTREMITY: ICD-10-CM

## 2022-03-28 DIAGNOSIS — M76.62 ACHILLES BURSITIS OF LEFT LOWER EXTREMITY: Primary | ICD-10-CM

## 2022-03-28 DIAGNOSIS — R26.9 GAIT DIFFICULTY: ICD-10-CM

## 2022-03-28 PROCEDURE — 97110 THERAPEUTIC EXERCISES: CPT | Performed by: PHYSICAL THERAPIST

## 2022-03-28 PROCEDURE — 97530 THERAPEUTIC ACTIVITIES: CPT | Performed by: PHYSICAL THERAPIST

## 2022-03-28 PROCEDURE — 97140 MANUAL THERAPY 1/> REGIONS: CPT | Performed by: PHYSICAL THERAPIST

## 2022-03-28 NOTE — PROGRESS NOTES
Physical Therapy Daily Progress Note      Patient: Carolyn Nicolas   : 1945  Diagnosis/ICD-10 Code:  Achilles bursitis of left lower extremity [M76.62]  Referring practitioner: Ramon Leblanc, *  Date of Initial Visit: Type: THERAPY  Noted: 2022  Today's Date: 3/29/2022  Patient seen for 11 sessions    Subjective : Carolyn Nicolas reports: I tried a compression sock that didn't get up to my knee.  It stopped about the calf level and the foot was less swollen but I felt it in the calf (Pt was educated to get a compression sleeve that reaches the knee, prior to).      Objective: EDEMA: met heads:2.5 cm                                   Malleolus: 26.5 cm   Ankle ROM:  AROM:  DF 8 PROM: 12 (following manual intervention)     See Exercise, Manual, and Modality Logs for complete treatment.     Assessment/Plan:It appears that appropriate edema control can be beneficial for her but she has not been able to acquire appropriate garment as of yet.  Her ROM and strength has met plateau but her pain levels are much better and gait is acceptable.  She is to follow up with physician about better edema control via medication as needed.    Pt will be discharged to [facility].      Other     Manual Therapy:    15     mins  84889;  Therapeutic Exercise:    25     mins  49314;     Neuromuscular Kelle:    -    mins  21658;    Therapeutic Activity:     10     mins  10708;   Tests and measures + education on edema control  Gait Training:      -     mins  29021;     Ultrasound:     -     mins  86587;    Electrical Stimulation:    -     mins  41243 ( );  Dry Needling     -     mins self-pay    Timed Treatment:   50   mins   Total Treatment:     55   mins      SARAHI Echols License #[id number]    Physical Therapist

## 2022-04-21 RX ORDER — FLUTICASONE PROPIONATE 0.05 MG/G
OINTMENT TOPICAL
Qty: 15 G | Refills: 0 | Status: SHIPPED | OUTPATIENT
Start: 2022-04-21 | End: 2022-10-26

## 2022-07-15 ENCOUNTER — OFFICE VISIT (OUTPATIENT)
Dept: OBSTETRICS AND GYNECOLOGY | Age: 77
End: 2022-07-15

## 2022-07-15 VITALS
BODY MASS INDEX: 27.2 KG/M2 | SYSTOLIC BLOOD PRESSURE: 128 MMHG | HEIGHT: 70 IN | DIASTOLIC BLOOD PRESSURE: 74 MMHG | WEIGHT: 190 LBS

## 2022-07-15 DIAGNOSIS — Z12.31 ENCOUNTER FOR SCREENING MAMMOGRAM FOR MALIGNANT NEOPLASM OF BREAST: Primary | ICD-10-CM

## 2022-07-15 DIAGNOSIS — N90.4 LICHEN SCLEROSUS OF FEMALE GENITALIA: ICD-10-CM

## 2022-07-15 PROCEDURE — 99213 OFFICE O/P EST LOW 20 MIN: CPT | Performed by: OBSTETRICS & GYNECOLOGY

## 2022-07-15 RX ORDER — DULAGLUTIDE 1.5 MG/.5ML
INJECTION, SOLUTION SUBCUTANEOUS
COMMUNITY
Start: 2022-03-15 | End: 2022-10-26

## 2022-07-15 RX ORDER — FAMOTIDINE 40 MG/1
TABLET, FILM COATED ORAL
COMMUNITY
Start: 2022-07-06

## 2022-07-15 NOTE — PROGRESS NOTES
GYN Visit    7/15/2022    Patient: Carolyn Nicolas          MR#:[id number]      Chief Complaint   Patient presents with   • Follow-up     1 year, F/U for lichen sclerosus       History of Present Illness    76 y.o. female  who presents for  Follow up to   Does have flare ups but easily treated with valisone ointment, no other issues    Due for mammo    No urinary incontinence    No LMP recorded (exact date). Patient is postmenopausal.    ________________________________________  Patient Active Problem List   Diagnosis   • GERD (gastroesophageal reflux disease)   • History of colon polyps   • Non-alcoholic fatty liver disease   • Impaired renal function   • Breathlessness on exertion   • Cardiomyopathy (HCC)   • Benign essential HTN   • CAD in native artery   • Primary osteoarthritis of both knees   • MGUS (monoclonal gammopathy of unknown significance)   • Anemia of chronic renal failure, stage 3 (moderate) ([facility])   • Chronic pain of both knees   • Arthritis of both knees   • Automatic implantable cardioverter-defibrillator in situ   • Heart failure ([facility])   • Hyperlipidemia   • Hypertension   • Hypercalcemia   • Generalized ischemic myocardial dysfunction   • Primary osteoarthritis of one knee   • Arthritis of right knee   • Status post total right knee replacement   • History of total knee arthroplasty   • Arthritis of left knee   • History of total knee arthroplasty, right   • Chronic pain of left knee       Past Medical History:   Diagnosis Date   • Anemia    • Arthritis    • Atrial fibrillation ([facility])    • Cataract    • CHF (congestive heart failure) ([facility])    • Chronic kidney disease    • Coronary artery disease    • Diabetes mellitus ([facility])    • GERD (gastroesophageal reflux disease)    • Goiter    • History of cardiomyopathy    • Hyperlipidemia    • Hypertension    • Implantable cardioverter-defibrillator discharge    • Left bundle branch block    • MGUS (monoclonal gammopathy of unknown significance)     • Osteopenia    • Restless leg    • Shortness of breath    • Sleep apnea        Past Surgical History:   Procedure Laterality Date   • CARDIAC CATHETERIZATION  2005   • CARDIAC DEFIBRILLATOR PLACEMENT  2011   • CARDIAC DEFIBRILLATOR PLACEMENT     • CATARACT EXTRACTION     • COLONOSCOPY     • COLONOSCOPY N/A 1/8/2020    Procedure: COLONOSCOPY to Cecum and TI;  Surgeon: Dwight Monae MD;  Location: [facility] ENDOSCOPY;  Service: Gastroenterology   • CORONARY ANGIOPLASTY WITH STENT PLACEMENT  2010   • ENDOSCOPY     • ENDOSCOPY N/A 1/8/2020    Procedure: ESOPHAGOGASTRODUODENOSCOPY with Bx's;  Surgeon: Dwight Monae MD;  Location: [facility] ENDOSCOPY;  Service: Gastroenterology   • ENDOSCOPY AND COLONOSCOPY N/A 3/8/2016    Procedure: ESOPHAGOGASTRODUODENOSCOPY AND COLONOSCOPY;  Surgeon: Dwight Monae MD;  Location: [facility] ENDOSCOPY;  Service:    • EYE SURGERY     • JOINT REPLACEMENT     • KNEE ARTHROSCOPY Bilateral 2009 2007   • TOTAL KNEE ARTHROPLASTY Right 2/14/2017    Procedure: TOTAL KNEE ARTHROPLASTY WITH MARGE NAVIGATION;  Surgeon: Andrew Monae MD;  Location: [facility] MAIN OR;  Service:        Social History     Tobacco Use   Smoking Status Never Smoker   Smokeless Tobacco Never Used       has a current medication list which includes the following prescription(s): aspirin, atorvastatin, b-d uf iii mini pen needles, bd pen needle jose u/f, betamethasone valerate, carvedilol, trulicity, entresto, famotidine, furosemide, glimepiride, insulin lispro (1 unit dial), insulin pen needle, metformin, one touch ultra test, onetouch delica lancets 33g, pramipexole, vitamin d, amoxicillin, esomeprazole, fluticasone, glimepiride, and fluad quadrivalent.  ________________________________________    Current contraception: post menopausal status      The following portions of the patient's history were reviewed and updated as appropriate: allergies, current medications, past family history, past medical history, past  "social history, past surgical history and problem list.    Review of Systems    Pertinent items are noted in HPI.     Objective   Physical Exam    /74   Ht 176.5 cm (69.5\")   Wt 86.2 kg (190 lb)   LMP  (Exact Date)   Breastfeeding No   BMI 27.66 kg/m²    BP Readings from Last 3 Encounters:   07/15/22 128/74   10/27/21 142/77   07/15/21 130/72      Wt Readings from Last 3 Encounters:   07/15/22 86.2 kg (190 lb)   10/27/21 90.4 kg (199 lb 4.8 oz)   07/15/21 87.5 kg (193 lb)      BMI: Estimated body mass index is 27.66 kg/m² as calculated from the following:    Height as of this encounter: 176.5 cm (69.5\").    Weight as of this encounter: 86.2 kg (190 lb).    Lungs: non labored breathing, no wheezing or tachpnea  Extremities: extremities normal, atraumatic, no cyanosis or edema  Skin: Skin color, texture, turgor normal. No rashes or lesions  Neurologic: Grossly normal  General:   alert, appears stated age and cooperative   Abdomen: soft, non-tender, without masses or organomegaly       Vulva: normal, Bartholin's, Urethra, [place]'s normal, no suspicious lesions, some loss of architexture and atrophy   Vagina: normal mucosa                 Assessment:      Diagnoses and all orders for this visit:    1. Encounter for screening mammogram for malignant neoplasm of breast (Primary)  -     Mammo Screening Bilateral With CAD    2. Lichen sclerosus of female genitalia      Follow up one to two years,   Continue valisone ointment, pt doesn't need refill at this time        "

## 2022-09-02 ENCOUNTER — OFFICE (OUTPATIENT)
Dept: URBAN - METROPOLITAN AREA CLINIC 65 | Facility: CLINIC | Age: 77
End: 2022-09-02

## 2022-09-02 VITALS
WEIGHT: 191 LBS | DIASTOLIC BLOOD PRESSURE: 72 MMHG | HEIGHT: 69 IN | SYSTOLIC BLOOD PRESSURE: 110 MMHG | HEART RATE: 70 BPM

## 2022-09-02 DIAGNOSIS — K21.9 GASTRO-ESOPHAGEAL REFLUX DISEASE WITHOUT ESOPHAGITIS: ICD-10-CM

## 2022-09-02 PROCEDURE — 99213 OFFICE O/P EST LOW 20 MIN: CPT | Performed by: INTERNAL MEDICINE

## 2022-09-02 RX ORDER — FAMOTIDINE 40 MG/1
TABLET, FILM COATED ORAL
Qty: 180 | Refills: 4 | Status: ACTIVE
Start: 2022-09-02

## 2022-10-12 ENCOUNTER — LAB (OUTPATIENT)
Dept: LAB | Facility: HOSPITAL | Age: 77
End: 2022-10-12

## 2022-10-12 DIAGNOSIS — D47.2 MGUS (MONOCLONAL GAMMOPATHY OF UNKNOWN SIGNIFICANCE): ICD-10-CM

## 2022-10-12 LAB
ANION GAP SERPL CALCULATED.3IONS-SCNC: 11.8 MMOL/L (ref 5–15)
BASOPHILS # BLD AUTO: 0.04 10*3/MM3 (ref 0–0.2)
BASOPHILS NFR BLD AUTO: 0.8 % (ref 0–1.5)
BUN SERPL-MCNC: 23 MG/DL (ref 6–20)
BUN/CREAT SERPL: 20.4 (ref 7.3–30)
CALCIUM SPEC-SCNC: 9.6 MG/DL (ref 8.5–10.2)
CHLORIDE SERPL-SCNC: 105 MMOL/L (ref 98–107)
CO2 SERPL-SCNC: 25.2 MMOL/L (ref 22–29)
CREAT SERPL-MCNC: 1.13 MG/DL (ref 0.6–1.1)
DEPRECATED RDW RBC AUTO: 45.1 FL (ref 37–54)
EGFRCR SERPLBLD CKD-EPI 2021: 50.8 ML/MIN/1.73
EOSINOPHIL # BLD AUTO: 0.13 10*3/MM3 (ref 0–0.4)
EOSINOPHIL NFR BLD AUTO: 2.5 % (ref 0.3–6.2)
ERYTHROCYTE [DISTWIDTH] IN BLOOD BY AUTOMATED COUNT: 12.6 % (ref 12.3–15.4)
GLUCOSE SERPL-MCNC: 212 MG/DL (ref 74–124)
HCT VFR BLD AUTO: 41.4 % (ref 34–46.6)
HGB BLD-MCNC: 13.6 G/DL (ref 12–15.9)
IMM GRANULOCYTES # BLD AUTO: 0.01 10*3/MM3 (ref 0–0.05)
IMM GRANULOCYTES NFR BLD AUTO: 0.2 % (ref 0–0.5)
LYMPHOCYTES # BLD AUTO: 1.71 10*3/MM3 (ref 0.7–3.1)
LYMPHOCYTES NFR BLD AUTO: 32.9 % (ref 19.6–45.3)
MCH RBC QN AUTO: 32.3 PG (ref 26.6–33)
MCHC RBC AUTO-ENTMCNC: 32.9 G/DL (ref 31.5–35.7)
MCV RBC AUTO: 98.3 FL (ref 79–97)
MONOCYTES # BLD AUTO: 0.22 10*3/MM3 (ref 0.1–0.9)
MONOCYTES NFR BLD AUTO: 4.2 % (ref 5–12)
NEUTROPHILS NFR BLD AUTO: 3.08 10*3/MM3 (ref 1.7–7)
NEUTROPHILS NFR BLD AUTO: 59.4 % (ref 42.7–76)
NRBC BLD AUTO-RTO: 0 /100 WBC (ref 0–0.2)
PLATELET # BLD AUTO: 115 10*3/MM3 (ref 140–450)
PMV BLD AUTO: 12 FL (ref 6–12)
POTASSIUM SERPL-SCNC: 4.8 MMOL/L (ref 3.5–4.7)
RBC # BLD AUTO: 4.21 10*6/MM3 (ref 3.77–5.28)
SODIUM SERPL-SCNC: 142 MMOL/L (ref 134–145)
WBC NRBC COR # BLD: 5.19 10*3/MM3 (ref 3.4–10.8)

## 2022-10-12 PROCEDURE — 36415 COLL VENOUS BLD VENIPUNCTURE: CPT

## 2022-10-12 PROCEDURE — 80048 BASIC METABOLIC PNL TOTAL CA: CPT

## 2022-10-12 PROCEDURE — 85025 COMPLETE CBC W/AUTO DIFF WBC: CPT

## 2022-10-13 LAB
ALBUMIN SERPL ELPH-MCNC: 3.7 G/DL (ref 2.9–4.4)
ALBUMIN/GLOB SERPL: 1.3 {RATIO} (ref 0.7–1.7)
ALPHA1 GLOB SERPL ELPH-MCNC: 0.2 G/DL (ref 0–0.4)
ALPHA2 GLOB SERPL ELPH-MCNC: 0.7 G/DL (ref 0.4–1)
B-GLOBULIN SERPL ELPH-MCNC: 1 G/DL (ref 0.7–1.3)
GAMMA GLOB SERPL ELPH-MCNC: 1.1 G/DL (ref 0.4–1.8)
GLOBULIN SER-MCNC: 3 G/DL (ref 2.2–3.9)
IGA SERPL-MCNC: 161 MG/DL (ref 64–422)
IGG SERPL-MCNC: 841 MG/DL (ref 586–1602)
IGM SERPL-MCNC: 308 MG/DL (ref 26–217)
INTERPRETATION SERPL IEP-IMP: ABNORMAL
KAPPA LC FREE SER-MCNC: 37.8 MG/L (ref 3.3–19.4)
KAPPA LC FREE/LAMBDA FREE SER: 1.57 {RATIO} (ref 0.26–1.65)
LABORATORY COMMENT REPORT: ABNORMAL
LAMBDA LC FREE SERPL-MCNC: 24.1 MG/L (ref 5.7–26.3)
M PROTEIN SERPL ELPH-MCNC: ABNORMAL G/DL
PROT SERPL-MCNC: 6.7 G/DL (ref 6–8.5)

## 2022-10-26 ENCOUNTER — APPOINTMENT (OUTPATIENT)
Dept: LAB | Facility: HOSPITAL | Age: 77
End: 2022-10-26

## 2022-10-26 ENCOUNTER — OFFICE VISIT (OUTPATIENT)
Dept: ONCOLOGY | Facility: CLINIC | Age: 77
End: 2022-10-26

## 2022-10-26 VITALS
BODY MASS INDEX: 28.11 KG/M2 | OXYGEN SATURATION: 99 % | WEIGHT: 189.8 LBS | TEMPERATURE: 96.9 F | HEIGHT: 69 IN | SYSTOLIC BLOOD PRESSURE: 118 MMHG | RESPIRATION RATE: 16 BRPM | HEART RATE: 73 BPM | DIASTOLIC BLOOD PRESSURE: 74 MMHG

## 2022-10-26 DIAGNOSIS — D47.2 MGUS (MONOCLONAL GAMMOPATHY OF UNKNOWN SIGNIFICANCE): Primary | ICD-10-CM

## 2022-10-26 PROCEDURE — 99214 OFFICE O/P EST MOD 30 MIN: CPT | Performed by: INTERNAL MEDICINE

## 2022-10-26 RX ORDER — DULAGLUTIDE 3 MG/.5ML
INJECTION, SOLUTION SUBCUTANEOUS
COMMUNITY
Start: 2022-10-15

## 2022-10-26 NOTE — PROGRESS NOTES
Subjective .     REASONS FOR FOLLOWUP:  MGUS, intermittent anemia and thrombocytopenia    HISTORY OF PRESENT ILLNESS:  The patient is a 76 y.o. year old female  who is here for follow-up with the above-mentioned history.    No new problems.  No fever, chills, weight loss, night sweats.  No bleeding    Past Medical History:   Diagnosis Date   • Anemia    • Arthritis    • Atrial fibrillation (HCC)    • Cataract    • CHF (congestive heart failure) (HCC)    • Chronic kidney disease    • Coronary artery disease    • Diabetes mellitus (HCC)    • GERD (gastroesophageal reflux disease)    • Goiter    • History of cardiomyopathy    • Hyperlipidemia    • Hypertension    • Implantable cardioverter-defibrillator discharge    • Left bundle branch block    • MGUS (monoclonal gammopathy of unknown significance)    • Osteopenia    • Restless leg    • Shortness of breath    • Sleep apnea      Past Surgical History:   Procedure Laterality Date   • CARDIAC CATHETERIZATION  2005   • CARDIAC DEFIBRILLATOR PLACEMENT  2011   • CARDIAC DEFIBRILLATOR PLACEMENT     • CATARACT EXTRACTION     • COLONOSCOPY     • COLONOSCOPY N/A 1/8/2020    Procedure: COLONOSCOPY to Cecum and TI;  Surgeon: Dwight Monae MD;  Location: [facility] ENDOSCOPY;  Service: Gastroenterology   • CORONARY ANGIOPLASTY WITH STENT PLACEMENT  2010   • ENDOSCOPY     • ENDOSCOPY N/A 1/8/2020    Procedure: ESOPHAGOGASTRODUODENOSCOPY with Bx's;  Surgeon: Dwight Monae MD;  Location: [facility] ENDOSCOPY;  Service: Gastroenterology   • ENDOSCOPY AND COLONOSCOPY N/A 3/8/2016    Procedure: ESOPHAGOGASTRODUODENOSCOPY AND COLONOSCOPY;  Surgeon: Dwight Monae MD;  Location: [facility] ENDOSCOPY;  Service:    • EYE SURGERY     • JOINT REPLACEMENT     • KNEE ARTHROSCOPY Bilateral 2009 2007   • TOTAL KNEE ARTHROPLASTY Right 2/14/2017    Procedure: TOTAL KNEE ARTHROPLASTY WITH MARGE NAVIGATION;  Surgeon: Andrew Monae MD;  Location: [facility] MAIN OR;  Service:         HEMATOLOGIC/ONCOLOGIC HISTORY:  (History from previous dates can be found in the separate document.)    MEDICATIONS    Current Outpatient Medications:   •  amoxicillin (AMOXIL) 500 MG capsule, TAKE 4 CAPSULES BY MOUTH 1 HR PRIOR TO DENTAL APPT., Disp: , Rfl:   •  aspirin 81 MG tablet, Take 162 mg by mouth Daily., Disp: , Rfl:   •  atorvastatin (LIPITOR) 20 MG tablet, Take 20 mg by mouth Every Night., Disp: , Rfl:   •  B-D UF III MINI PEN NEEDLES 31G X 5 MM misc, INJECT 1 EACH INTO THE SKIN DAILY, Disp: , Rfl: 6  •  BD PEN NEEDLE JACOB U/F 32G X 4 MM misc, , Disp: , Rfl:   •  betamethasone valerate (VALISONE) 0.1 % cream, APPLY TO AFFECTED AREA TWICE A DAY, Disp: , Rfl:   •  carvedilol (COREG) 6.25 MG tablet, Take 6.25 mg by mouth 2 (Two) Times a Day. 2 tablets in the am and 3 in the pm, Disp: , Rfl:   •  ENTRESTO 49-51 MG tablet, TAKE 1 TABLET TWICE A DAY, Disp: 180 tablet, Rfl: 1  •  famotidine (PEPCID) 40 MG tablet, , Disp: , Rfl:   •  furosemide (LASIX) 40 MG tablet, Take 40 mg by mouth Daily As Needed. 1-2 tablets per day, Disp: , Rfl:   •  glimepiride (AMARYL) 4 MG tablet, , Disp: , Rfl:   •  Influenza Vac A&B SA Adj Quad (Fluad Quadrivalent) 0.5 ML prefilled syringe injection, PHARMACY ADMINISTERED, Disp: , Rfl:   •  Insulin Lispro, 1 Unit Dial, (HUMALOG) 100 UNIT/ML solution pen-injector, Inject 5 units for every 50 blood glucose is over 200.., Disp: , Rfl:   •  Insulin Pen Needle 31G X 5 MM misc, INJECT 1 EACH INTO THE SKIN DAILY, Disp: , Rfl:   •  metFORMIN (GLUCOPHAGE) 500 MG tablet, , Disp: , Rfl:   •  ONE TOUCH ULTRA TEST test strip, USE TO TEST BLOOD SUGAR FOUR TIMES DAILY (DX: 250.02), Disp: , Rfl: 3  •  ONETOUCH DELICA LANCETS 33G misc, USE TO TEST BLOOD SUGAR FOUR TIMES DAILY (DX:250.02), Disp: , Rfl: 3  •  pramipexole (MIRAPEX) 0.5 MG tablet, Take 0.5 mg by mouth Every Night., Disp: , Rfl:   •  Trulicity 3 MG/0.5ML solution pen-injector, , Disp: , Rfl:   •  vitamin D (ERGOCALCIFEROL) 11518  "UNITS capsule capsule, Take 50,000 Units by mouth 1 (One) Time Per Week., Disp: , Rfl:     ALLERGIES:     Allergies   Allergen Reactions   • Amoxicillin Nausea And Vomiting     Patient states that she does not have an issue with this anymore.       SOCIAL HISTORY:       Social History     Socioeconomic History   • Marital status:    • Years of education: College   Tobacco Use   • Smoking status: Never   • Smokeless tobacco: Never   Vaping Use   • Vaping Use: Never used   Substance and Sexual Activity   • Alcohol use: No     Comment: rare   • Drug use: No   • Sexual activity: Defer         FAMILY HISTORY:  Family History   Problem Relation Age of Onset   • Diabetes Mother    • Heart disease Mother    • Hypertension Mother    • Heart failure Mother    • Arthritis Mother    • Stroke Mother    • Heart disease Father    • Cancer Father 85        Bladder   • Heart attack Father    • Arthritis Father    • Sleep apnea Father    • Cancer Brother 58        Bladder   • Heart disease Maternal Grandmother    • Heart disease Maternal Grandfather        REVIEW OF SYSTEMS:  Review of Systems   Constitutional: Negative for activity change.   HENT: Negative for nosebleeds and trouble swallowing.    Respiratory: Negative for shortness of breath and wheezing.    Cardiovascular: Negative for chest pain and palpitations.   Gastrointestinal: Negative for constipation, diarrhea and nausea.   Genitourinary: Negative for dysuria and hematuria.   Musculoskeletal: Negative for arthralgias and myalgias.   Skin: Negative for rash and wound.   Neurological: Negative for seizures and syncope.   Hematological: Negative for adenopathy. Does not bruise/bleed easily.   Psychiatric/Behavioral: Negative for confusion.        Objective    Vitals:    10/26/22 0941   BP: 118/74   Pulse: 73   Resp: 16   Temp: 96.9 °F (36.1 °C)   TempSrc: Temporal   SpO2: 99%   Weight: 86.1 kg (189 lb 12.8 oz)   Height: 176.5 cm (69.49\")   PainSc: 0-No pain "     Current Status 10/26/2022   ECOG score 0      PHYSICAL EXAM:        CONSTITUTIONAL:  Vital signs reviewed.  No distress, looks comfortable.  EYES:  Conjunctiva and lids unremarkable.  PERRLA  EARS,NOSE,MOUTH,THROAT:  Ears and nose appear unremarkable.  Lips, teeth, gums appear unremarkable.  RESPIRATORY:  Normal respiratory effort.  Lungs clear to auscultation bilaterally.  CARDIOVASCULAR:  Normal S1, S2.  No murmurs rubs or gallops.  No significant lower extremity edema.  GASTROINTESTINAL: Abdomen appears unremarkable.  Nontender.  No hepatomegaly.  No splenomegaly.  LYMPHATIC:  No cervical, supraclavicular, axillary lymphadenopathy.  SKIN:  Warm.  No rashes.  PSYCHIATRIC:  Normal judgment and insight.  Normal mood and affect.        RECENT LABS:        WBC   Date/Time Value Ref Range Status   10/12/2022 09:38 AM 5.19 3.40 - 10.80 10*3/mm3 Final   03/09/2021 09:29 AM 6.33 4.5 - 11.0 10*3/uL Final     Hemoglobin   Date/Time Value Ref Range Status   10/12/2022 09:38 AM 13.6 12.0 - 15.9 g/dL Final   03/09/2021 09:29 AM 14.7 12.0 - 16.0 g/dL Final     Platelets   Date/Time Value Ref Range Status   10/12/2022 09:38  (L) 140 - 450 10*3/mm3 Final   03/09/2021 09:29  (L) 140 - 440 10*3/uL Final       Assessment & Plan     *  IgM lambda monoclonal gammopathy of unknown significance. CAT scans showed no evidence of an underlying lymphoma on 08/03/2012.   Labs 9/4/18: No clear evidence of M spike.  Labs 10/16/2019: No evidence of M spike.    K/L ratio normal.  · Labs 10/14/2020: No M spike.  Normal K/L ratio.  · 10/13/2021: No M spike.  Normal K/L ratio.  · 10/12/2022: No M spike.  Normal K/L ratio.    * Intermittent anemia and intermittent thrombocytopenia. History of borderline ferritin of 43. ferrous sulfate twice per day since 08/10/2012.  GI evaluation by Dr. Monae in the past. Hemoccult cards negative x 3 on /11/20/2012.   Recent iron studies are normal.   10/13/2021: Hb 13.6,   10/12/2022: Hb  13.6, .      *  Questionable 7 mm L4 lesion seen on CAT scan on 8/3/12. This was not seen on metastatic bone survey. Plan followup The L4 lesion initially seen on 8/3/12 was unchanged with central fat density and was felt by the radiologist to be benign on followup CAT scan on 1/15/13. Therefore, plan no further followup CAT scans.     *Chronic renal insufficiency. She follows with Dr. Teresa Rosales.   Worsened kidney function can be a sign of progression to multiple myeloma.  Therefore, I am following this also.  Creatinine 1.13, from 0.96    * Questionable small lytic lesion in the skull, first seen on bone survey on 07/08/2013.   · CAT scan of the head 07/ 30/2013 and 10/23/2013 show no change and offered no additional information. She did have some venous lakes in her skull. 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   · Unchanged on bone survey 04/30/2015 and bone survey 8/28/17, and bone survey 10/17/2019  · Therefore, plan no more follow-up of this    *Possible lytic lesion right acetabulum on frontal view of the pelvis on bone survey 8/28/17.  However, not seen on proximal right femur frontal view.  Radiologist felt this likely represented overlying bowel, but stated a follow-up x-ray of the pelvis could be obtained for confirmation.  Patient declined this but agreed to repeat a bone survey.  · Bone survey 10/17/2019: No lytic lesions other than the unchanged lucent area in the skull  · Therefore, plan no more follow-up of this    *Overweight.  Body mass index is 27.64 kg/m².  BMI 25 to <30 is overweight  BMI 30 to <35 is class 1 obesity  BMI 35 to <40 is class 2 obesity  BMI 40 or higher is class 3 obesity   Being overweight can lead to cytopenias through hepatic steatosis.  She should ideally lose weight.  Continues to be overweight.  Ideally, lose weight    *10/27/2021: Bilateral lower extremity swelling  · 10/27/2021: Doppler negative for DVT      PLAN:   · M.D. visit in one year with CBC, BMP, SPEP, S  TESS, serum free light chains 2 weeks prior.   · Plan no more bone surveys unless labs significantly change.      Addendum  Doppler negative for DVT

## 2022-10-31 ENCOUNTER — APPOINTMENT (OUTPATIENT)
Dept: WOMENS IMAGING | Facility: HOSPITAL | Age: 77
End: 2022-10-31

## 2022-10-31 ENCOUNTER — PROCEDURE VISIT (OUTPATIENT)
Dept: OBSTETRICS AND GYNECOLOGY | Age: 77
End: 2022-10-31

## 2022-10-31 DIAGNOSIS — Z12.31 VISIT FOR SCREENING MAMMOGRAM: Primary | ICD-10-CM

## 2022-10-31 PROCEDURE — 77067 SCR MAMMO BI INCL CAD: CPT | Performed by: OBSTETRICS & GYNECOLOGY

## 2022-10-31 PROCEDURE — 77063 BREAST TOMOSYNTHESIS BI: CPT | Performed by: RADIOLOGY

## 2022-10-31 PROCEDURE — 77063 BREAST TOMOSYNTHESIS BI: CPT | Performed by: OBSTETRICS & GYNECOLOGY

## 2022-10-31 PROCEDURE — 77067 SCR MAMMO BI INCL CAD: CPT | Performed by: RADIOLOGY

## 2022-11-03 DIAGNOSIS — R92.8 ABNORMAL MAMMOGRAM: Primary | ICD-10-CM

## 2022-11-17 ENCOUNTER — APPOINTMENT (OUTPATIENT)
Dept: WOMENS IMAGING | Facility: HOSPITAL | Age: 77
End: 2022-11-17

## 2022-11-17 PROCEDURE — G0279 TOMOSYNTHESIS, MAMMO: HCPCS | Performed by: RADIOLOGY

## 2022-11-17 PROCEDURE — 77066 DX MAMMO INCL CAD BI: CPT | Performed by: RADIOLOGY

## 2022-11-17 PROCEDURE — 76642 ULTRASOUND BREAST LIMITED: CPT | Performed by: RADIOLOGY

## 2022-11-22 ENCOUNTER — TELEPHONE (OUTPATIENT)
Dept: OBSTETRICS AND GYNECOLOGY | Age: 77
End: 2022-11-22

## 2022-11-22 DIAGNOSIS — N60.01 BILATERAL BREAST CYSTS: Primary | ICD-10-CM

## 2022-11-22 DIAGNOSIS — N60.02 BILATERAL BREAST CYSTS: Primary | ICD-10-CM

## 2022-11-22 NOTE — TELEPHONE ENCOUNTER
Pt has viewed results in [facility]:  imaging appears benign. Radiologist recommend bilateral breast US in 6 months, order placed.  LM notifying pt someone will call with appt information for follow up imaging.

## 2023-05-25 ENCOUNTER — APPOINTMENT (OUTPATIENT)
Dept: WOMENS IMAGING | Facility: HOSPITAL | Age: 78
End: 2023-05-25
Payer: MEDICARE

## 2023-05-25 PROCEDURE — 76642 ULTRASOUND BREAST LIMITED: CPT | Performed by: RADIOLOGY

## 2023-05-26 DIAGNOSIS — R92.8 ABNORMAL MAMMOGRAM: Primary | ICD-10-CM

## 2023-11-06 ENCOUNTER — LAB (OUTPATIENT)
Dept: LAB | Facility: HOSPITAL | Age: 78
End: 2023-11-06
Payer: MEDICARE

## 2023-11-06 DIAGNOSIS — D47.2 MGUS (MONOCLONAL GAMMOPATHY OF UNKNOWN SIGNIFICANCE): ICD-10-CM

## 2023-11-06 LAB
ANION GAP SERPL CALCULATED.3IONS-SCNC: 15.7 MMOL/L (ref 5–15)
BASOPHILS # BLD AUTO: 0.03 10*3/MM3 (ref 0–0.2)
BASOPHILS NFR BLD AUTO: 0.5 % (ref 0–1.5)
BUN SERPL-MCNC: 28 MG/DL (ref 8–23)
BUN/CREAT SERPL: 26.4 (ref 7–25)
CALCIUM SPEC-SCNC: 9.8 MG/DL (ref 8.6–10.5)
CHLORIDE SERPL-SCNC: 103 MMOL/L (ref 98–107)
CO2 SERPL-SCNC: 27.3 MMOL/L (ref 22–29)
CREAT SERPL-MCNC: 1.06 MG/DL (ref 0.6–1.1)
DEPRECATED RDW RBC AUTO: 46 FL (ref 37–54)
EGFRCR SERPLBLD CKD-EPI 2021: 54.6 ML/MIN/1.73
EOSINOPHIL # BLD AUTO: 0.16 10*3/MM3 (ref 0–0.4)
EOSINOPHIL NFR BLD AUTO: 2.5 % (ref 0.3–6.2)
ERYTHROCYTE [DISTWIDTH] IN BLOOD BY AUTOMATED COUNT: 12.4 % (ref 12.3–15.4)
GLUCOSE SERPL-MCNC: 142 MG/DL (ref 65–99)
HCT VFR BLD AUTO: 44.1 % (ref 34–46.6)
HGB BLD-MCNC: 14.5 G/DL (ref 12–15.9)
IMM GRANULOCYTES # BLD AUTO: 0.02 10*3/MM3 (ref 0–0.05)
IMM GRANULOCYTES NFR BLD AUTO: 0.3 % (ref 0–0.5)
LYMPHOCYTES # BLD AUTO: 1.97 10*3/MM3 (ref 0.7–3.1)
LYMPHOCYTES NFR BLD AUTO: 31.2 % (ref 19.6–45.3)
MCH RBC QN AUTO: 32.9 PG (ref 26.6–33)
MCHC RBC AUTO-ENTMCNC: 32.9 G/DL (ref 31.5–35.7)
MCV RBC AUTO: 100 FL (ref 79–97)
MONOCYTES # BLD AUTO: 0.3 10*3/MM3 (ref 0.1–0.9)
MONOCYTES NFR BLD AUTO: 4.7 % (ref 5–12)
NEUTROPHILS NFR BLD AUTO: 3.84 10*3/MM3 (ref 1.7–7)
NEUTROPHILS NFR BLD AUTO: 60.8 % (ref 42.7–76)
NRBC BLD AUTO-RTO: 0 /100 WBC (ref 0–0.2)
PLATELET # BLD AUTO: 144 10*3/MM3 (ref 140–450)
PMV BLD AUTO: 11.4 FL (ref 6–12)
POTASSIUM SERPL-SCNC: 5.1 MMOL/L (ref 3.5–5.2)
RBC # BLD AUTO: 4.41 10*6/MM3 (ref 3.77–5.28)
SODIUM SERPL-SCNC: 146 MMOL/L (ref 136–145)
WBC NRBC COR # BLD: 6.32 10*3/MM3 (ref 3.4–10.8)

## 2023-11-06 PROCEDURE — 36415 COLL VENOUS BLD VENIPUNCTURE: CPT

## 2023-11-06 PROCEDURE — 80048 BASIC METABOLIC PNL TOTAL CA: CPT

## 2023-11-06 PROCEDURE — 85025 COMPLETE CBC W/AUTO DIFF WBC: CPT

## 2023-11-07 LAB
ALBUMIN SERPL ELPH-MCNC: 3.8 G/DL (ref 2.9–4.4)
ALBUMIN/GLOB SERPL: 1.3 {RATIO} (ref 0.7–1.7)
ALPHA1 GLOB SERPL ELPH-MCNC: 0.2 G/DL (ref 0–0.4)
ALPHA2 GLOB SERPL ELPH-MCNC: 0.7 G/DL (ref 0.4–1)
B-GLOBULIN SERPL ELPH-MCNC: 1 G/DL (ref 0.7–1.3)
GAMMA GLOB SERPL ELPH-MCNC: 1.1 G/DL (ref 0.4–1.8)
GLOBULIN SER-MCNC: 3 G/DL (ref 2.2–3.9)
IGA SERPL-MCNC: 192 MG/DL (ref 64–422)
IGG SERPL-MCNC: 983 MG/DL (ref 586–1602)
IGM SERPL-MCNC: 355 MG/DL (ref 26–217)
INTERPRETATION SERPL IEP-IMP: ABNORMAL
KAPPA LC FREE SER-MCNC: 36.8 MG/L (ref 3.3–19.4)
KAPPA LC FREE/LAMBDA FREE SER: 1.28 {RATIO} (ref 0.26–1.65)
LABORATORY COMMENT REPORT: ABNORMAL
LAMBDA LC FREE SERPL-MCNC: 28.8 MG/L (ref 5.7–26.3)
M PROTEIN SERPL ELPH-MCNC: ABNORMAL G/DL
PROT SERPL-MCNC: 6.8 G/DL (ref 6–8.5)

## 2023-11-20 ENCOUNTER — TELEPHONE (OUTPATIENT)
Dept: ONCOLOGY | Facility: CLINIC | Age: 78
End: 2023-11-20
Payer: MEDICARE

## 2023-11-20 ENCOUNTER — OFFICE VISIT (OUTPATIENT)
Dept: ONCOLOGY | Facility: CLINIC | Age: 78
End: 2023-11-20
Payer: MEDICARE

## 2023-11-20 VITALS
TEMPERATURE: 98 F | OXYGEN SATURATION: 97 % | BODY MASS INDEX: 28.19 KG/M2 | HEIGHT: 69 IN | HEART RATE: 86 BPM | WEIGHT: 190.3 LBS | SYSTOLIC BLOOD PRESSURE: 111 MMHG | DIASTOLIC BLOOD PRESSURE: 77 MMHG | RESPIRATION RATE: 16 BRPM

## 2023-11-20 DIAGNOSIS — D47.2 MGUS (MONOCLONAL GAMMOPATHY OF UNKNOWN SIGNIFICANCE): Primary | ICD-10-CM

## 2023-11-20 PROCEDURE — 3074F SYST BP LT 130 MM HG: CPT | Performed by: INTERNAL MEDICINE

## 2023-11-20 PROCEDURE — 1126F AMNT PAIN NOTED NONE PRSNT: CPT | Performed by: INTERNAL MEDICINE

## 2023-11-20 PROCEDURE — 99214 OFFICE O/P EST MOD 30 MIN: CPT | Performed by: INTERNAL MEDICINE

## 2023-11-20 PROCEDURE — 3078F DIAST BP <80 MM HG: CPT | Performed by: INTERNAL MEDICINE

## 2023-11-20 RX ORDER — DULAGLUTIDE 4.5 MG/.5ML
INJECTION, SOLUTION SUBCUTANEOUS
COMMUNITY
Start: 2023-11-14

## 2023-11-20 RX ORDER — ESOMEPRAZOLE MAGNESIUM 40 MG/1
CAPSULE, DELAYED RELEASE ORAL
COMMUNITY
Start: 2023-11-14

## 2023-11-20 NOTE — TELEPHONE ENCOUNTER
----- Message from April Joan Cantu RN sent at 11/20/2023  3:15 PM EST -----  Per Dr. Quiñones's note:  She wants to talk to someone to see if her insurance will cover her for future follow-up in our office.   Thanks Mireya

## 2023-11-20 NOTE — PROGRESS NOTES
Subjective .     REASONS FOR FOLLOWUP:  MGUS, intermittent anemia and thrombocytopenia    HISTORY OF PRESENT ILLNESS:  The patient is a 77 y.o. year old female  who is here for follow-up with the above-mentioned history.    No new issues.  No fever, chills, weight loss, night sweats, pain, bleeding.    Past Medical History:   Diagnosis Date    Anemia     Arthritis     Atrial fibrillation     Cataract     CHF (congestive heart failure)     Chronic kidney disease     Coronary artery disease     Diabetes mellitus     GERD (gastroesophageal reflux disease)     Goiter     History of cardiomyopathy     Hyperlipidemia     Hypertension     Implantable cardioverter-defibrillator discharge     Left bundle branch block     Lichen sclerosus     MGUS (monoclonal gammopathy of unknown significance)     Osteopenia     Restless leg     Shortness of breath     Sleep apnea      Past Surgical History:   Procedure Laterality Date    CARDIAC CATHETERIZATION  2005    CARDIAC DEFIBRILLATOR PLACEMENT  2011    CARDIAC DEFIBRILLATOR PLACEMENT      CATARACT EXTRACTION      COLONOSCOPY      COLONOSCOPY N/A 1/8/2020    Procedure: COLONOSCOPY to Cecum and TI;  Surgeon: Dwight Monae MD;  Location: [facility] ENDOSCOPY;  Service: Gastroenterology    CORONARY ANGIOPLASTY WITH STENT PLACEMENT  2010    ENDOSCOPY      ENDOSCOPY N/A 1/8/2020    Procedure: ESOPHAGOGASTRODUODENOSCOPY with Bx's;  Surgeon: Dwight Monae MD;  Location: [facility] ENDOSCOPY;  Service: Gastroenterology    ENDOSCOPY AND COLONOSCOPY N/A 3/8/2016    Procedure: ESOPHAGOGASTRODUODENOSCOPY AND COLONOSCOPY;  Surgeon: Dwight Monae MD;  Location: [facility] ENDOSCOPY;  Service:     EYE SURGERY      JOINT REPLACEMENT      KNEE ARTHROSCOPY Bilateral 2009 2007    TOTAL KNEE ARTHROPLASTY Right 2/14/2017    Procedure: TOTAL KNEE ARTHROPLASTY WITH MARGE NAVIGATION;  Surgeon: Andrew Monae MD;  Location: [facility] MAIN OR;  Service:        HEMATOLOGIC/ONCOLOGIC HISTORY:  (History from  previous dates can be found in the separate document.)    MEDICATIONS    Current Outpatient Medications:     aspirin 81 MG tablet, Take 2 tablets by mouth Daily., Disp: , Rfl:     atorvastatin (LIPITOR) 20 MG tablet, Take 1 tablet by mouth Every Night., Disp: , Rfl:     carvedilol (COREG) 6.25 MG tablet, Take 1 tablet by mouth 2 (Two) Times a Day. 2 tablets in the am and 3 in the pm, Disp: , Rfl:     ENTRESTO 49-51 MG tablet, TAKE 1 TABLET TWICE A DAY, Disp: 180 tablet, Rfl: 1    esomeprazole (nexIUM) 40 MG capsule, , Disp: , Rfl:     furosemide (LASIX) 40 MG tablet, Take 1 tablet by mouth Daily As Needed. 1-2 tablets per day, Disp: , Rfl:     glimepiride (AMARYL) 4 MG tablet, , Disp: , Rfl:     Insulin Lispro, 1 Unit Dial, (HUMALOG) 100 UNIT/ML solution pen-injector, Inject 5 units for every 50 blood glucose is over 200.., Disp: , Rfl:     metFORMIN (GLUCOPHAGE) 500 MG tablet, , Disp: , Rfl:     pramipexole (MIRAPEX) 0.5 MG tablet, Take 1 tablet by mouth Every Night., Disp: , Rfl:     Trulicity 3 MG/0.5ML solution pen-injector, , Disp: , Rfl:     Trulicity 4.5 MG/0.5ML solution pen-injector, INJECT 4.5 MG UNDER THE SKIN EVERY 7 DAYS, Disp: , Rfl:     vitamin D (ERGOCALCIFEROL) 84720 UNITS capsule capsule, Take 1 capsule by mouth 1 (One) Time Per Week., Disp: , Rfl:     amoxicillin (AMOXIL) 500 MG capsule, TAKE 4 CAPSULES BY MOUTH 1 HR PRIOR TO DENTAL APPT. (Patient not taking: Reported on 11/20/2023), Disp: , Rfl:     B-D UF III MINI PEN NEEDLES 31G X 5 MM misc, INJECT 1 EACH INTO THE SKIN DAILY (Patient not taking: Reported on 11/20/2023), Disp: , Rfl: 6    BD PEN NEEDLE JACOB U/F 32G X 4 MM misc, , Disp: , Rfl:     betamethasone valerate (VALISONE) 0.1 % ointment, Apply 1 application  topically to the appropriate area as directed 2 (Two) Times a Day. Apply BID for 1 week for flare up , apply 2-3 times weekly for maintenance (Patient not taking: Reported on 11/20/2023), Disp: 45 g, Rfl: 3    famotidine (PEPCID) 40  MG tablet, , Disp: , Rfl:     Influenza Vac A&B SA Adj Quad (Fluad Quadrivalent) 0.5 ML prefilled syringe injection, PHARMACY ADMINISTERED (Patient not taking: Reported on 7/20/2023), Disp: , Rfl:     Insulin Pen Needle 31G X 5 MM misc, INJECT 1 EACH INTO THE SKIN DAILY (Patient not taking: Reported on 11/20/2023), Disp: , Rfl:     ONE TOUCH ULTRA TEST test strip, USE TO TEST BLOOD SUGAR FOUR TIMES DAILY (DX: 250.02) (Patient not taking: Reported on 11/20/2023), Disp: , Rfl: 3    ONETOUCH DELICA LANCETS 33G misc, USE TO TEST BLOOD SUGAR FOUR TIMES DAILY (DX:250.02) (Patient not taking: Reported on 11/20/2023), Disp: , Rfl: 3    ALLERGIES:     Allergies   Allergen Reactions    Amoxicillin Nausea And Vomiting     Patient states that she does not have an issue with this anymore.       SOCIAL HISTORY:       Social History     Socioeconomic History    Marital status:     Years of education: College   Tobacco Use    Smoking status: Never    Smokeless tobacco: Never   Vaping Use    Vaping Use: Never used   Substance and Sexual Activity    Alcohol use: No     Comment: rare    Drug use: No    Sexual activity: Defer         FAMILY HISTORY:  Family History   Problem Relation Age of Onset    Diabetes Mother     Heart disease Mother     Hypertension Mother     Heart failure Mother     Arthritis Mother     Stroke Mother     Heart disease Father     Cancer Father 85        Bladder    Heart attack Father     Arthritis Father     Sleep apnea Father     Cancer Brother 58        Bladder    Heart disease Maternal Grandmother     Heart disease Maternal Grandfather        REVIEW OF SYSTEMS:  Review of Systems   Constitutional:  Negative for activity change.   HENT:  Negative for nosebleeds and trouble swallowing.    Respiratory:  Negative for shortness of breath and wheezing.    Cardiovascular:  Negative for chest pain and palpitations.   Gastrointestinal:  Negative for constipation, diarrhea and nausea.   Genitourinary:   "Negative for dysuria and hematuria.   Musculoskeletal:  Negative for arthralgias and myalgias.   Skin:  Negative for rash and wound.   Neurological:  Negative for seizures and syncope.   Hematological:  Negative for adenopathy. Does not bruise/bleed easily.   Psychiatric/Behavioral:  Negative for confusion.         Objective    Vitals:    11/20/23 1055   BP: 111/77   Pulse: 86   Resp: 16   Temp: 98 °F (36.7 °C)   TempSrc: Temporal   SpO2: 97%   Weight: 86.3 kg (190 lb 4.8 oz)   Height: 176.5 cm (69.49\")   PainSc: 0-No pain         11/20/2023    10:57 AM   Current Status   ECOG score 0      PHYSICAL EXAM:        CONSTITUTIONAL:  Vital signs reviewed.  No distress, looks comfortable.  EYES:  Conjunctiva and lids unremarkable.  PERRLA  EARS,NOSE,MOUTH,THROAT:  Ears and nose appear unremarkable.  Lips, teeth, gums appear unremarkable.  RESPIRATORY:  Normal respiratory effort.  Lungs clear to auscultation bilaterally.  CARDIOVASCULAR:  Normal S1, S2.  No murmurs rubs or gallops.  No significant lower extremity edema.  GASTROINTESTINAL: Abdomen appears unremarkable.  Nontender.  No hepatomegaly.  No splenomegaly.  LYMPHATIC:  No cervical, supraclavicular, axillary lymphadenopathy.  SKIN:  Warm.  No rashes.  PSYCHIATRIC:  Normal judgment and insight.  Normal mood and affect.          RECENT LABS:        WBC   Date/Time Value Ref Range Status   11/06/2023 11:04 AM 6.32 3.40 - 10.80 10*3/mm3 Final   05/15/2023 10:51 AM 5.39 4.5 - 11.0 10*3/uL Final     Hemoglobin   Date/Time Value Ref Range Status   11/06/2023 11:04 AM 14.5 12.0 - 15.9 g/dL Final   05/15/2023 10:51 AM 13.0 12.0 - 16.0 g/dL Final     Platelets   Date/Time Value Ref Range Status   11/06/2023 11:04  140 - 450 10*3/mm3 Final   05/15/2023 10:51  (L) 140 - 440 10*3/uL Final       Assessment & Plan     *  IgM lambda monoclonal gammopathy of unknown significance. CAT scans showed no evidence of an underlying lymphoma on 08/03/2012.   Labs 9/4/18: No " clear evidence of M spike.  Labs 10/16/2019: No evidence of M spike.    K/L ratio normal.  Labs 10/14/2020: No M spike.  Normal K/L ratio.  10/13/2021: No M spike.  Normal K/L ratio.  10/12/2022: No M spike.  Normal K/L ratio.  11/6/23: No clear M spike.  Normal K/L ratio.    * Intermittent anemia and intermittent thrombocytopenia. History of borderline ferritin of 43. ferrous sulfate twice per day since 08/10/2012.  GI evaluation by Dr. Monae in the past. Hemoccult cards negative x 3 on /11/20/2012.   Recent iron studies are normal.   10/13/2021: Hb 13.6,   10/12/2022: Hb 13.6, .  11/6/2023: Hb 14.5,       *  Questionable 7 mm L4 lesion seen on CAT scan on 8/3/12. This was not seen on metastatic bone survey. Plan followup The L4 lesion initially seen on 8/3/12 was unchanged with central fat density and was felt by the radiologist to be benign on followup CAT scan on 1/15/13. Therefore, plan no further followup CAT scans.     *Chronic renal insufficiency. She follows with Dr. Teresa Rosales.   Worsened kidney function can be a sign of progression to multiple myeloma.  Therefore, I am following this also.  Creatinine 1.06, from 1.13, from 0.96    * Questionable small lytic lesion in the skull, first seen on bone survey on 07/08/2013.   CAT scan of the head 07/ 30/2013 and 10/23/2013 show no change and offered no additional information. She did have some venous lakes in her skull. 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   Unchanged on bone survey 04/30/2015 and bone survey 8/28/17, and bone survey 10/17/2019  Therefore, plan no more follow-up of this    *Possible lytic lesion right acetabulum on frontal view of the pelvis on bone survey 8/28/17.  However, not seen on proximal right femur frontal view.  Radiologist felt this likely represented overlying bowel, but stated a follow-up x-ray of the pelvis could be obtained for confirmation.  Patient declined this but agreed to repeat a bone survey.  Bone  survey 10/17/2019: No lytic lesions other than the unchanged lucent area in the skull  Therefore, plan no more follow-up of this    *Overweight.  Body mass index is 27.71 kg/m².  BMI 25 to <30 is overweight  BMI 30 to <35 is class 1 obesity  BMI 35 to <40 is class 2 obesity  BMI 40 or higher is class 3 obesity   Being overweight can lead to cytopenias through hepatic steatosis.  She should ideally lose weight.  Continuing to be overweight.  Ideally, lose weight    *10/27/2021: Bilateral lower extremity swelling  10/27/2021: Doppler negative for DVT      PLAN:   M.D. visit in one year with CBC, BMP, SPEP, S TESS, serum free light chains 2 weeks prior.   Plan no more bone surveys unless labs significantly change.

## 2023-11-29 ENCOUNTER — APPOINTMENT (OUTPATIENT)
Dept: WOMENS IMAGING | Facility: HOSPITAL | Age: 78
End: 2023-11-29
Payer: MEDICARE

## 2023-11-29 PROCEDURE — 77066 DX MAMMO INCL CAD BI: CPT | Performed by: RADIOLOGY

## 2023-11-29 PROCEDURE — 76642 ULTRASOUND BREAST LIMITED: CPT | Performed by: RADIOLOGY

## 2023-11-29 PROCEDURE — G0279 TOMOSYNTHESIS, MAMMO: HCPCS | Performed by: RADIOLOGY

## 2023-11-30 DIAGNOSIS — R92.8 ABNORMAL MAMMOGRAM: Primary | ICD-10-CM

## 2024-05-29 ENCOUNTER — APPOINTMENT (OUTPATIENT)
Dept: WOMENS IMAGING | Facility: HOSPITAL | Age: 79
End: 2024-05-29
Payer: MEDICARE

## 2024-05-29 PROCEDURE — 76642 ULTRASOUND BREAST LIMITED: CPT | Performed by: RADIOLOGY

## 2024-05-30 DIAGNOSIS — N60.01 BREAST CYST, RIGHT: ICD-10-CM

## 2024-05-30 DIAGNOSIS — Z09 FOLLOW-UP EXAM, 3-6 MONTHS SINCE PREVIOUS EXAM: Primary | ICD-10-CM

## 2024-09-13 ENCOUNTER — OFFICE (OUTPATIENT)
Age: 79
End: 2024-09-13

## 2024-09-13 ENCOUNTER — OFFICE (OUTPATIENT)
Dept: URBAN - METROPOLITAN AREA CLINIC 65 | Facility: CLINIC | Age: 79
End: 2024-09-13

## 2024-09-13 VITALS
HEIGHT: 69 IN | SYSTOLIC BLOOD PRESSURE: 110 MMHG | HEIGHT: 69 IN | SYSTOLIC BLOOD PRESSURE: 110 MMHG | OXYGEN SATURATION: 97 % | HEART RATE: 73 BPM | DIASTOLIC BLOOD PRESSURE: 68 MMHG | OXYGEN SATURATION: 97 % | HEIGHT: 69 IN | HEART RATE: 73 BPM | WEIGHT: 187 LBS | HEART RATE: 73 BPM | WEIGHT: 187 LBS | DIASTOLIC BLOOD PRESSURE: 68 MMHG | HEART RATE: 73 BPM | WEIGHT: 187 LBS | SYSTOLIC BLOOD PRESSURE: 110 MMHG | HEART RATE: 73 BPM | HEART RATE: 73 BPM | OXYGEN SATURATION: 97 % | HEIGHT: 69 IN | OXYGEN SATURATION: 97 % | WEIGHT: 187 LBS | DIASTOLIC BLOOD PRESSURE: 68 MMHG | SYSTOLIC BLOOD PRESSURE: 110 MMHG | WEIGHT: 187 LBS | WEIGHT: 187 LBS | HEIGHT: 69 IN | SYSTOLIC BLOOD PRESSURE: 110 MMHG | DIASTOLIC BLOOD PRESSURE: 68 MMHG | DIASTOLIC BLOOD PRESSURE: 68 MMHG | WEIGHT: 187 LBS | HEIGHT: 69 IN | HEART RATE: 73 BPM | OXYGEN SATURATION: 97 % | HEIGHT: 69 IN | OXYGEN SATURATION: 97 % | OXYGEN SATURATION: 97 % | DIASTOLIC BLOOD PRESSURE: 68 MMHG | SYSTOLIC BLOOD PRESSURE: 110 MMHG | SYSTOLIC BLOOD PRESSURE: 110 MMHG | DIASTOLIC BLOOD PRESSURE: 68 MMHG

## 2024-09-13 DIAGNOSIS — K76.9 LIVER DISEASE, UNSPECIFIED: ICD-10-CM

## 2024-09-13 DIAGNOSIS — Z86.010 PERSONAL HISTORY OF COLON POLYPS: ICD-10-CM

## 2024-09-13 DIAGNOSIS — K21.9 GASTRO-ESOPHAGEAL REFLUX DISEASE WITHOUT ESOPHAGITIS: ICD-10-CM

## 2024-09-13 PROCEDURE — 99213 OFFICE O/P EST LOW 20 MIN: CPT | Performed by: INTERNAL MEDICINE

## 2024-11-04 ENCOUNTER — LAB (OUTPATIENT)
Dept: LAB | Facility: HOSPITAL | Age: 79
End: 2024-11-04
Payer: MEDICARE

## 2024-11-04 DIAGNOSIS — D47.2 MGUS (MONOCLONAL GAMMOPATHY OF UNKNOWN SIGNIFICANCE): ICD-10-CM

## 2024-11-04 LAB
ANION GAP SERPL CALCULATED.3IONS-SCNC: 13.3 MMOL/L (ref 5–15)
BASOPHILS # BLD AUTO: 0.04 10*3/MM3 (ref 0–0.2)
BASOPHILS NFR BLD AUTO: 0.8 % (ref 0–1.5)
BUN SERPL-MCNC: 20 MG/DL (ref 8–23)
BUN/CREAT SERPL: 19.2 (ref 7–25)
CALCIUM SPEC-SCNC: 9.2 MG/DL (ref 8.6–10.5)
CHLORIDE SERPL-SCNC: 105 MMOL/L (ref 98–107)
CO2 SERPL-SCNC: 22.7 MMOL/L (ref 22–29)
CREAT SERPL-MCNC: 1.04 MG/DL (ref 0.57–1)
DEPRECATED RDW RBC AUTO: 47.5 FL (ref 37–54)
EGFRCR SERPLBLD CKD-EPI 2021: 55.5 ML/MIN/1.73
EOSINOPHIL # BLD AUTO: 0.12 10*3/MM3 (ref 0–0.4)
EOSINOPHIL NFR BLD AUTO: 2.3 % (ref 0.3–6.2)
ERYTHROCYTE [DISTWIDTH] IN BLOOD BY AUTOMATED COUNT: 13 % (ref 12.3–15.4)
GLUCOSE SERPL-MCNC: 216 MG/DL (ref 65–99)
HCT VFR BLD AUTO: 39.2 % (ref 34–46.6)
HGB BLD-MCNC: 12.4 G/DL (ref 12–15.9)
IMM GRANULOCYTES # BLD AUTO: 0.01 10*3/MM3 (ref 0–0.05)
IMM GRANULOCYTES NFR BLD AUTO: 0.2 % (ref 0–0.5)
LYMPHOCYTES # BLD AUTO: 1.56 10*3/MM3 (ref 0.7–3.1)
LYMPHOCYTES NFR BLD AUTO: 29.4 % (ref 19.6–45.3)
MCH RBC QN AUTO: 31.2 PG (ref 26.6–33)
MCHC RBC AUTO-ENTMCNC: 31.6 G/DL (ref 31.5–35.7)
MCV RBC AUTO: 98.5 FL (ref 79–97)
MONOCYTES # BLD AUTO: 0.25 10*3/MM3 (ref 0.1–0.9)
MONOCYTES NFR BLD AUTO: 4.7 % (ref 5–12)
NEUTROPHILS NFR BLD AUTO: 3.33 10*3/MM3 (ref 1.7–7)
NEUTROPHILS NFR BLD AUTO: 62.6 % (ref 42.7–76)
NRBC BLD AUTO-RTO: 0 /100 WBC (ref 0–0.2)
PLATELET # BLD AUTO: 130 10*3/MM3 (ref 140–450)
PMV BLD AUTO: 11.4 FL (ref 6–12)
POTASSIUM SERPL-SCNC: 5.2 MMOL/L (ref 3.5–5.2)
RBC # BLD AUTO: 3.98 10*6/MM3 (ref 3.77–5.28)
SODIUM SERPL-SCNC: 141 MMOL/L (ref 136–145)
WBC NRBC COR # BLD AUTO: 5.31 10*3/MM3 (ref 3.4–10.8)

## 2024-11-04 PROCEDURE — 85025 COMPLETE CBC W/AUTO DIFF WBC: CPT

## 2024-11-04 PROCEDURE — 36415 COLL VENOUS BLD VENIPUNCTURE: CPT

## 2024-11-04 PROCEDURE — 80048 BASIC METABOLIC PNL TOTAL CA: CPT

## 2024-11-06 LAB
ALBUMIN SERPL ELPH-MCNC: 3.4 G/DL (ref 2.9–4.4)
ALBUMIN/GLOB SERPL: 1.2 {RATIO} (ref 0.7–1.7)
ALPHA1 GLOB SERPL ELPH-MCNC: 0.2 G/DL (ref 0–0.4)
ALPHA2 GLOB SERPL ELPH-MCNC: 0.6 G/DL (ref 0.4–1)
B-GLOBULIN SERPL ELPH-MCNC: 1 G/DL (ref 0.7–1.3)
GAMMA GLOB SERPL ELPH-MCNC: 1 G/DL (ref 0.4–1.8)
GLOBULIN SER-MCNC: 2.9 G/DL (ref 2.2–3.9)
IGA SERPL-MCNC: 160 MG/DL (ref 64–422)
IGG SERPL-MCNC: 883 MG/DL (ref 586–1602)
IGM SERPL-MCNC: 337 MG/DL (ref 26–217)
INTERPRETATION SERPL IEP-IMP: ABNORMAL
KAPPA LC FREE SER-MCNC: 35.8 MG/L (ref 3.3–19.4)
KAPPA LC FREE/LAMBDA FREE SER: 1.14 {RATIO} (ref 0.26–1.65)
LABORATORY COMMENT REPORT: ABNORMAL
LAMBDA LC FREE SERPL-MCNC: 31.4 MG/L (ref 5.7–26.3)
M PROTEIN SERPL ELPH-MCNC: ABNORMAL G/DL
PROT SERPL-MCNC: 6.3 G/DL (ref 6–8.5)

## 2024-11-18 ENCOUNTER — OFFICE VISIT (OUTPATIENT)
Dept: ONCOLOGY | Facility: CLINIC | Age: 79
End: 2024-11-18
Payer: MEDICARE

## 2024-11-18 VITALS
TEMPERATURE: 97.8 F | DIASTOLIC BLOOD PRESSURE: 76 MMHG | HEIGHT: 69 IN | OXYGEN SATURATION: 96 % | HEART RATE: 70 BPM | WEIGHT: 195.6 LBS | BODY MASS INDEX: 28.97 KG/M2 | SYSTOLIC BLOOD PRESSURE: 129 MMHG | RESPIRATION RATE: 16 BRPM

## 2024-11-18 DIAGNOSIS — D47.2 MGUS (MONOCLONAL GAMMOPATHY OF UNKNOWN SIGNIFICANCE): Primary | ICD-10-CM

## 2024-11-18 PROCEDURE — 3078F DIAST BP <80 MM HG: CPT | Performed by: INTERNAL MEDICINE

## 2024-11-18 PROCEDURE — 99214 OFFICE O/P EST MOD 30 MIN: CPT | Performed by: INTERNAL MEDICINE

## 2024-11-18 PROCEDURE — 1126F AMNT PAIN NOTED NONE PRSNT: CPT | Performed by: INTERNAL MEDICINE

## 2024-11-18 PROCEDURE — G2211 COMPLEX E/M VISIT ADD ON: HCPCS | Performed by: INTERNAL MEDICINE

## 2024-11-18 PROCEDURE — 3074F SYST BP LT 130 MM HG: CPT | Performed by: INTERNAL MEDICINE

## 2024-11-18 RX ORDER — ESOMEPRAZOLE MAGNESIUM 40 MG/1
90 CAPSULE, DELAYED RELEASE ORAL
COMMUNITY

## 2024-11-18 RX ORDER — ALLOPURINOL 100 MG/1
100 TABLET ORAL DAILY
COMMUNITY
Start: 2024-07-29

## 2024-11-18 RX ORDER — COLCHICINE 0.6 MG/1
TABLET ORAL
COMMUNITY

## 2024-11-18 NOTE — PROGRESS NOTES
Subjective .     REASONS FOR FOLLOWUP:  MGUS, intermittent anemia and thrombocytopenia    HISTORY OF PRESENT ILLNESS:  The patient is a 78 y.o. year old female  who is here for follow-up with the above-mentioned history.    No new problems.  No complaints of fever, chills, weight loss, night sweats, or unusual areas of pain    Past Medical History:   Diagnosis Date    Anemia     Arthritis     Atrial fibrillation     Cataract     CHF (congestive heart failure)     Chronic kidney disease     Coronary artery disease     Diabetes mellitus     GERD (gastroesophageal reflux disease)     Goiter     History of cardiomyopathy     Hyperlipidemia     Hypertension     Implantable cardioverter-defibrillator discharge     Left bundle branch block     Lichen sclerosus     MGUS (monoclonal gammopathy of unknown significance)     Osteopenia     Restless leg     Shortness of breath     Sleep apnea      Past Surgical History:   Procedure Laterality Date    CARDIAC CATHETERIZATION  2005    CARDIAC DEFIBRILLATOR PLACEMENT  2011    CARDIAC DEFIBRILLATOR PLACEMENT      CATARACT EXTRACTION      COLONOSCOPY      COLONOSCOPY N/A 1/8/2020    Procedure: COLONOSCOPY to Cecum and TI;  Surgeon: Dwight Monae MD;  Location: [facility] ENDOSCOPY;  Service: Gastroenterology    CORONARY ANGIOPLASTY WITH STENT PLACEMENT  2010    ENDOSCOPY      ENDOSCOPY N/A 1/8/2020    Procedure: ESOPHAGOGASTRODUODENOSCOPY with Bx's;  Surgeon: Dwight Monae MD;  Location: [facility] ENDOSCOPY;  Service: Gastroenterology    ENDOSCOPY AND COLONOSCOPY N/A 3/8/2016    Procedure: ESOPHAGOGASTRODUODENOSCOPY AND COLONOSCOPY;  Surgeon: Dwight Monae MD;  Location: [facility] ENDOSCOPY;  Service:     EYE SURGERY      JOINT REPLACEMENT      KNEE ARTHROSCOPY Bilateral 2009 2007    TOTAL KNEE ARTHROPLASTY Right 2/14/2017    Procedure: TOTAL KNEE ARTHROPLASTY WITH MARGE NAVIGATION;  Surgeon: Andrew Monae MD;  Location: [facility] MAIN OR;  Service:        HEMATOLOGIC/ONCOLOGIC  HISTORY:  (History from previous dates can be found in the separate document.)    MEDICATIONS    Current Outpatient Medications:     allopurinol (ZYLOPRIM) 100 MG tablet, Take 1 tablet by mouth Daily., Disp: , Rfl:     aspirin 81 MG tablet, Take 2 tablets by mouth Daily., Disp: , Rfl:     atorvastatin (LIPITOR) 20 MG tablet, Take 1 tablet by mouth Every Night., Disp: , Rfl:     B-D UF III MINI PEN NEEDLES 31G X 5 MM misc, INJECT 1 EACH INTO THE SKIN DAILY, Disp: , Rfl: 6    BD PEN NEEDLE JACOB U/F 32G X 4 MM misc, , Disp: , Rfl:     betamethasone valerate (VALISONE) 0.1 % ointment, Apply 1 application  topically to the appropriate area as directed 2 (Two) Times a Day. Apply BID for 1 week for flare up , apply 2-3 times weekly for maintenance, Disp: 45 g, Rfl: 3    carvedilol (COREG) 6.25 MG tablet, Take 1 tablet by mouth 2 (Two) Times a Day. 2 tablets in the am and 3 in the pm, Disp: , Rfl:     colchicine 0.6 MG tablet, 30, Disp: , Rfl:     ENTRESTO 49-51 MG tablet, TAKE 1 TABLET TWICE A DAY, Disp: 180 tablet, Rfl: 1    esomeprazole (nexIUM) 40 MG capsule, 90 capsules., Disp: , Rfl:     furosemide (LASIX) 40 MG tablet, Take 1 tablet by mouth Daily As Needed. 1-2 tablets per day, Disp: , Rfl:     glimepiride (AMARYL) 4 MG tablet, , Disp: , Rfl:     Influenza Vac A&B SA Adj Quad (Fluad Quadrivalent) 0.5 ML prefilled syringe injection, , Disp: , Rfl:     Insulin Lispro, 1 Unit Dial, (HUMALOG) 100 UNIT/ML solution pen-injector, Inject 5 units for every 50 blood glucose is over 200.., Disp: , Rfl:     Insulin Pen Needle 31G X 5 MM misc, , Disp: , Rfl:     metFORMIN (GLUCOPHAGE) 500 MG tablet, , Disp: , Rfl:     ONE TOUCH ULTRA TEST test strip, USE TO TEST BLOOD SUGAR FOUR TIMES DAILY (DX: 250.02), Disp: , Rfl: 3    ONETOUCH DELICA LANCETS 33G misc, USE TO TEST BLOOD SUGAR FOUR TIMES DAILY (DX:250.02), Disp: , Rfl: 3    pramipexole (MIRAPEX) 0.5 MG tablet, Take 1 tablet by mouth Every Night., Disp: , Rfl:     Trulicity 3  MG/0.5ML solution pen-injector, , Disp: , Rfl:     vitamin D (ERGOCALCIFEROL) 07852 UNITS capsule capsule, Take 1 capsule by mouth 1 (One) Time Per Week., Disp: , Rfl:     amoxicillin (AMOXIL) 500 MG capsule, TAKE 4 CAPSULES BY MOUTH 1 HR PRIOR TO DENTAL APPT. (Patient not taking: Reported on 11/18/2024), Disp: , Rfl:     Trulicity 4.5 MG/0.5ML solution pen-injector, INJECT 4.5 MG UNDER THE SKIN EVERY 7 DAYS (Patient not taking: Reported on 11/18/2024), Disp: , Rfl:     ALLERGIES:     Allergies   Allergen Reactions    Amoxicillin Nausea And Vomiting     Patient states that she does not have an issue with this anymore.       SOCIAL HISTORY:       Social History     Socioeconomic History    Marital status:     Years of education: College   Tobacco Use    Smoking status: Never    Smokeless tobacco: Never   Vaping Use    Vaping status: Never Used   Substance and Sexual Activity    Alcohol use: No     Comment: rare    Drug use: No    Sexual activity: Defer         FAMILY HISTORY:  Family History   Problem Relation Age of Onset    Diabetes Mother     Heart disease Mother     Hypertension Mother     Heart failure Mother     Arthritis Mother     Stroke Mother     Heart disease Father     Cancer Father 85        Bladder    Heart attack Father     Arthritis Father     Sleep apnea Father     Cancer Brother 58        Bladder    Heart disease Maternal Grandmother     Heart disease Maternal Grandfather        REVIEW OF SYSTEMS:  Review of Systems   Constitutional:  Negative for activity change.   HENT:  Negative for nosebleeds and trouble swallowing.    Respiratory:  Negative for shortness of breath and wheezing.    Cardiovascular:  Negative for chest pain and palpitations.   Gastrointestinal:  Negative for constipation, diarrhea and nausea.   Genitourinary:  Negative for dysuria and hematuria.   Musculoskeletal:  Negative for arthralgias and myalgias.   Skin:  Negative for rash and wound.   Neurological:  Negative for  "seizures and syncope.   Hematological:  Negative for adenopathy. Does not bruise/bleed easily.   Psychiatric/Behavioral:  Negative for confusion.         Objective    Vitals:    11/18/24 1124   BP: 129/76   Pulse: 70   Resp: 16   Temp: 97.8 °F (36.6 °C)   TempSrc: Oral   SpO2: 96%   Weight: 88.7 kg (195 lb 9.6 oz)   Height: 176.5 cm (69.49\")   PainSc: 0-No pain         11/18/2024    11:27 AM   Current Status   ECOG score 1      PHYSICAL EXAM:    .    CONSTITUTIONAL:  Vital signs reviewed.  No distress, looks comfortable.  EYES:  Conjunctiva and lids unremarkable.  PERRLA  EARS,NOSE,MOUTH,THROAT:  Ears and nose appear unremarkable.  Lips, teeth, gums appear unremarkable.  RESPIRATORY:  Normal respiratory effort.  Lungs clear to auscultation bilaterally.  CARDIOVASCULAR:  Normal S1, S2.  No murmurs rubs or gallops.  No significant lower extremity edema.  GASTROINTESTINAL: Abdomen appears unremarkable.  Nontender.  No hepatomegaly.  No splenomegaly.  LYMPHATIC:  No cervical, supraclavicular, axillary lymphadenopathy.  SKIN:  Warm.  No rashes.  PSYCHIATRIC:  Normal judgment and insight.  Normal mood and affect.        RECENT LABS:        WBC   Date/Time Value Ref Range Status   11/04/2024 10:45 AM 5.31 3.40 - 10.80 10*3/mm3 Final   05/15/2023 10:51 AM 5.39 4.5 - 11.0 10*3/uL Final     Hemoglobin   Date/Time Value Ref Range Status   11/04/2024 10:45 AM 12.4 12.0 - 15.9 g/dL Final   05/15/2023 10:51 AM 13.0 12.0 - 16.0 g/dL Final     Platelets   Date/Time Value Ref Range Status   11/04/2024 10:45  (L) 140 - 450 10*3/mm3 Final   05/15/2023 10:51  (L) 140 - 440 10*3/uL Final       Assessment & Plan     *  IgM lambda monoclonal gammopathy of unknown significance. CAT scans showed no evidence of an underlying lymphoma on 08/03/2012.   Labs 9/4/18: No clear evidence of M spike.  Labs 10/16/2019: No evidence of M spike.    K/L ratio normal.  Labs 10/14/2020: No M spike.  Normal K/L ratio.  10/13/2021: No M spike.  " Normal K/L ratio.  10/12/2022: No M spike.  Normal K/L ratio.  11/6/23: No clear M spike.  Normal K/L ratio.  11/4/2024: No clear M spike.  Normal K/L ratio.    * Intermittent anemia and intermittent thrombocytopenia. History of borderline ferritin of 43. ferrous sulfate twice per day since 08/10/2012.  GI evaluation by Dr. Monae in the past. Hemoccult cards negative x 3 on /11/20/2012.   Recent iron studies are normal.   10/13/2021: Hb 13.6,   10/12/2022: Hb 13.6, .  11/6/2023: Hb 14.5,   11/4/2024: Hb 12.4,       *  Questionable 7 mm L4 lesion seen on CAT scan on 8/3/12. This was not seen on metastatic bone survey. Plan followup The L4 lesion initially seen on 8/3/12 was unchanged with central fat density and was felt by the radiologist to be benign on followup CAT scan on 1/15/13. Therefore, plan no further followup CAT scans.     *Chronic renal insufficiency. She follows with Dr. Teresa Rosales.   Worsened kidney function can be a sign of progression to multiple myeloma.  Therefore, I am following this also.  Creatinine 1.04, from 1.06, from 1.13, from 0.96    * Questionable small lytic lesion in the skull, first seen on bone survey on 07/08/2013.   CAT scan of the head 07/ 30/2013 and 10/23/2013 show no change and offered no additional information. She did have some venous lakes in her skull. 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   Unchanged on bone survey 04/30/2015 and bone survey 8/28/17, and bone survey 10/17/2019  Therefore, plan no more follow-up of this    *Possible lytic lesion right acetabulum on frontal view of the pelvis on bone survey 8/28/17.  However, not seen on proximal right femur frontal view.  Radiologist felt this likely represented overlying bowel, but stated a follow-up x-ray of the pelvis could be obtained for confirmation.  Patient declined this but agreed to repeat a bone survey.  Bone survey 10/17/2019: No lytic lesions other than the unchanged lucent area in the  skull  Therefore, plan no more follow-up of this    *Overweight.  Body mass index is 28.48 kg/m².  BMI 25 to <30 is overweight  BMI 30 to <35 is class 1 obesity  BMI 35 to <40 is class 2 obesity  BMI 40 or higher is class 3 obesity   Being overweight can lead to cytopenias through hepatic steatosis.  Remains overweight.  Ideally, lose weight    *10/27/2021: Bilateral lower extremity swelling  10/27/2021: Doppler negative for DVT      PLAN:   M.D. visit in one year with CBC, BMP, SPEP, S TESS, serum free light chains 2 weeks prior.   Plan no more bone surveys unless labs significantly change.

## 2024-12-04 ENCOUNTER — TRANSCRIBE ORDERS (OUTPATIENT)
Dept: PET IMAGING | Facility: HOSPITAL | Age: 79
End: 2024-12-04
Payer: MEDICARE

## 2024-12-04 ENCOUNTER — APPOINTMENT (OUTPATIENT)
Dept: WOMENS IMAGING | Facility: HOSPITAL | Age: 79
End: 2024-12-04
Payer: MEDICARE

## 2024-12-04 ENCOUNTER — HOSPITAL ENCOUNTER (OUTPATIENT)
Dept: PET IMAGING | Facility: HOSPITAL | Age: 79
Discharge: HOME | End: 2024-12-04
Payer: MEDICARE

## 2024-12-04 DIAGNOSIS — Z78.0 POSTMENOPAUSAL: Primary | ICD-10-CM

## 2024-12-04 PROCEDURE — 76642 ULTRASOUND BREAST LIMITED: CPT | Performed by: RADIOLOGY

## 2024-12-04 PROCEDURE — G0279 TOMOSYNTHESIS, MAMMO: HCPCS | Performed by: RADIOLOGY

## 2024-12-04 PROCEDURE — 77066 DX MAMMO INCL CAD BI: CPT | Performed by: RADIOLOGY

## 2025-03-24 ENCOUNTER — TRANSCRIBE ORDERS (OUTPATIENT)
Dept: ADMINISTRATIVE | Facility: HOSPITAL | Age: 80
End: 2025-03-24
Payer: MEDICARE

## 2025-03-24 DIAGNOSIS — R60.0 LOCALIZED EDEMA: Primary | ICD-10-CM

## 2025-03-25 ENCOUNTER — HOSPITAL ENCOUNTER (OUTPATIENT)
Dept: CARDIOLOGY | Facility: HOSPITAL | Age: 80
Discharge: HOME | End: 2025-03-25
Admitting: INTERNAL MEDICINE
Payer: MEDICARE

## 2025-03-25 DIAGNOSIS — R60.0 LOCALIZED EDEMA: ICD-10-CM

## 2025-03-25 LAB

## 2025-03-25 PROCEDURE — 93970 EXTREMITY STUDY: CPT | Performed by: STUDENT IN AN ORGANIZED HEALTH CARE EDUCATION/TRAINING PROGRAM

## 2025-03-25 PROCEDURE — 93970 EXTREMITY STUDY: CPT

## 2025-08-15 ENCOUNTER — HOSPITAL ENCOUNTER (OUTPATIENT)
Dept: PET IMAGING | Facility: HOSPITAL | Age: 80
Discharge: HOME | End: 2025-08-15
Admitting: INTERNAL MEDICINE
Payer: MEDICARE

## 2025-08-15 PROCEDURE — 77080 DXA BONE DENSITY AXIAL: CPT

## (undated) DEVICE — ADAPT CLN BIOGUARD AIR/H2O DISP

## (undated) DEVICE — THE TORRENT IRRIGATION SCOPE CONNECTOR IS USED WITH THE TORRENT IRRIGATION TUBING TO PROVIDE IRRIGATION FLUIDS SUCH AS STERILE WATER DURING GASTROINTESTINAL ENDOSCOPIC PROCEDURES WHEN USED IN CONJUNCTION WITH AN IRRIGATION PUMP (OR ELECTROSURGICAL UNIT).: Brand: TORRENT

## (undated) DEVICE — CANN NASL CO2 TRULINK W/O2 A/

## (undated) DEVICE — TUBING, SUCTION, 1/4" X 10', STRAIGHT: Brand: MEDLINE

## (undated) DEVICE — SINGLE-USE BIOPSY FORCEPS: Brand: RADIAL JAW 4

## (undated) DEVICE — SENSR O2 OXIMAX FNGR A/ 18IN NONSTR

## (undated) DEVICE — FRCP BX RADJAW4 NDL 2.8 240CM LG OG BX40

## (undated) DEVICE — KT ORCA ORCAPOD DISP STRL

## (undated) DEVICE — BITEBLOCK OMNI BLOC